# Patient Record
Sex: MALE | Race: WHITE | NOT HISPANIC OR LATINO | Employment: OTHER | URBAN - METROPOLITAN AREA
[De-identification: names, ages, dates, MRNs, and addresses within clinical notes are randomized per-mention and may not be internally consistent; named-entity substitution may affect disease eponyms.]

---

## 2019-09-17 ENCOUNTER — OFFICE VISIT (OUTPATIENT)
Dept: URGENT CARE | Facility: CLINIC | Age: 64
End: 2019-09-17
Payer: MEDICARE

## 2019-09-17 ENCOUNTER — TELEPHONE (OUTPATIENT)
Dept: URGENT CARE | Facility: CLINIC | Age: 64
End: 2019-09-17

## 2019-09-17 ENCOUNTER — HOSPITAL ENCOUNTER (OUTPATIENT)
Dept: RADIOLOGY | Facility: MEDICAL CENTER | Age: 64
End: 2019-09-17
Attending: PHYSICIAN ASSISTANT
Payer: MEDICARE

## 2019-09-17 VITALS
DIASTOLIC BLOOD PRESSURE: 64 MMHG | HEART RATE: 58 BPM | TEMPERATURE: 97.9 F | HEIGHT: 72 IN | BODY MASS INDEX: 22.54 KG/M2 | RESPIRATION RATE: 18 BRPM | WEIGHT: 166.4 LBS | SYSTOLIC BLOOD PRESSURE: 102 MMHG | OXYGEN SATURATION: 96 %

## 2019-09-17 DIAGNOSIS — L72.9 SKIN CYST: ICD-10-CM

## 2019-09-17 PROCEDURE — 76536 US EXAM OF HEAD AND NECK: CPT

## 2019-09-17 PROCEDURE — 99203 OFFICE O/P NEW LOW 30 MIN: CPT | Performed by: PHYSICIAN ASSISTANT

## 2019-09-17 RX ORDER — ATORVASTATIN CALCIUM 40 MG/1
40 TABLET, FILM COATED ORAL NIGHTLY
COMMUNITY

## 2019-09-17 RX ORDER — ESCITALOPRAM OXALATE 20 MG/1
20 TABLET ORAL EVERY EVENING
Status: ON HOLD | COMMUNITY
End: 2023-01-01

## 2019-09-17 RX ORDER — QUETIAPINE FUMARATE 25 MG/1
25 TABLET, FILM COATED ORAL EVERY EVENING
Status: ON HOLD | COMMUNITY
End: 2023-01-01

## 2019-09-17 ASSESSMENT — ENCOUNTER SYMPTOMS
VOMITING: 0
DIZZINESS: 0
HEADACHES: 0
FOCAL WEAKNESS: 0
SENSORY CHANGE: 0
SPEECH CHANGE: 0
FEVER: 0
CHILLS: 0
NAUSEA: 0
SHORTNESS OF BREATH: 0
SEIZURES: 0

## 2019-09-17 NOTE — PROGRESS NOTES
Subjective:     Roby Viramontes II is a 63 y.o. male who presents for Nodule ([place]row[place], soft moves around)       Patient comes clinic noting last over 1 year of skin cyst to top of head.  Patient notes area of soft fluctuant growth to top of head but somewhat mobile to skin of scalp.  Denies progression towards redness.  Denies drainage.  Denies pointing.  Denies fevers chills.  Notes is been present for long-term.  Did show primary care and they are thoughtful of it being a cystic progression.  Patient is seen no drainage from this.  Denies past medical history of similar.  Of note patient did have a brain surgery 2 to 3 years ago.  This is in a different location than his associated incisions.  He denies past medical history of similar.  Is tried no treatments.  History reviewed. No pertinent past medical history.History reviewed. No pertinent surgical history.  Social History     Socioeconomic History   • Marital status:      Spouse name: Not on file   • Number of children: Not on file   • Years of education: Not on file   • Highest education level: Not on file   Occupational History   • Not on file   Social Needs   • Financial resource strain: Not on file   • Food insecurity:     Worry: Not on file     Inability: Not on file   • Transportation needs:     Medical: Not on file     Non-medical: Not on file   Tobacco Use   • Smoking status: Former Smoker     Types: Cigarettes   • Smokeless tobacco: Never Used   Substance and Sexual Activity   • Alcohol use: Not Currently   • Drug use: Yes     Types: Marijuana   • Sexual activity: Not on file   Lifestyle   • Physical activity:     Days per week: Not on file     Minutes per session: Not on file   • Stress: Not on file   Relationships   • Social connections:     Talks on phone: Not on file     Gets together: Not on file     Attends Scientologist service: Not on file     Active member of club or organization: Not on file     Attends meetings of clubs or  organizations: Not on file     Relationship status: Not on file   • Intimate partner violence:     Fear of current or ex partner: Not on file     Emotionally abused: Not on file     Physically abused: Not on file     Forced sexual activity: Not on file   Other Topics Concern   • Not on file   Social History Narrative   • Not on file    History reviewed. No pertinent family history. Review of Systems   Constitutional: Negative for chills and fever.   Respiratory: Negative for shortness of breath.    Gastrointestinal: Negative for nausea and vomiting.   Skin: Negative for itching and rash.        POS for skin mass to top of head   Neurological: Negative for dizziness, sensory change, speech change, focal weakness, seizures ( PMH of seizures, resolved w/ brain surgery 2 yrs ago) and headaches.   No Known Allergies   Objective:   /64 (BP Location: Left arm, Patient Position: Sitting, BP Cuff Size: Adult)   Pulse (!) 58   Temp 36.6 °C (97.9 °F) (Temporal)   Resp 18   Ht 1.829 m (6')   Wt 75.5 kg (166 lb 6.4 oz)   SpO2 96%   BMI 22.57 kg/m²   Physical Exam   Constitutional: He is oriented to person, place, and time. He appears well-developed and well-nourished. No distress.   HENT:   Head: Normocephalic and atraumatic.       Right Ear: External ear normal.   Left Ear: External ear normal.   Nose: Nose normal.   Eyes: Conjunctivae are normal. Right eye exhibits no discharge. Left eye exhibits no discharge. No scleral icterus.   Neck: Neck supple.   Pulmonary/Chest: Effort normal. No respiratory distress.   Musculoskeletal: Normal range of motion.   Neurological: He is alert and oriented to person, place, and time. Coordination normal.   Skin: Skin is warm and dry. He is not diaphoretic. No pallor.   Psychiatric: He has a normal mood and affect.   Nursing note and vitals reviewed.  US soft tissue -   Impression       Heterogeneous hypoechoic solid mass in the left posterior scalp. Findings are nonspecific and  may represent an atypical spindle cell lipoma or sarcoma. Consider MRI with and without contrast or biopsy/resection.            Last Resulted: 09/17/19  1:26 PM              Assessment/Plan:   Assessment    1. Skin cyst  - US-SOFT TISSUES OF HEAD - NECK; Future  - REFERRAL TO DERMATOLOGY  - REFERRAL TO DERMATOLOGY    Other orders  - QUEtiapine (SEROQUEL) 25 MG Tab; Take 25 mg by mouth 2 times a day.  - atorvastatin (LIPITOR) 40 MG Tab; Take 40 mg by mouth every evening.  - escitalopram (LEXAPRO) 20 MG tablet; Take 20 mg by mouth every day.    I am able to contact dermatology and speak to their office.  Patient intends to follow-up with skin cancer [facility] for biopsy and resection and further diagnosis.  We did discuss possibly scheduling an MRI for him today when determined follow-up with dermatology may be more economical.  Patient is encouraged to reach out with challenges scheduling but an emergent referral has been placed today per dermatology's recommendations for rapid scheduling    Return to clinic with lack of resolution or progression of symptoms.      Differential diagnosis, natural history, supportive care, and indications for immediate follow-up discussed.

## 2019-09-23 ENCOUNTER — HOSPITAL ENCOUNTER (OUTPATIENT)
Dept: LAB | Facility: MEDICAL CENTER | Age: 64
End: 2019-09-23
Attending: NURSE PRACTITIONER | Admitting: NURSE PRACTITIONER
Payer: MEDICARE

## 2019-09-23 LAB
ALBUMIN SERPL BCP-MCNC: 4.1 G/DL (ref 3.2–4.9)
ALBUMIN/GLOB SERPL: 1.7 G/DL
ALP SERPL-CCNC: 130 U/L (ref 30–99)
ALT SERPL-CCNC: 11 U/L (ref 2–50)
ANION GAP SERPL CALC-SCNC: 7 MMOL/L (ref 0–11.9)
AST SERPL-CCNC: 13 U/L (ref 12–45)
BILIRUB SERPL-MCNC: 0.6 MG/DL (ref 0.1–1.5)
BUN SERPL-MCNC: 10 MG/DL (ref 8–22)
CALCIUM SERPL-MCNC: 9.2 MG/DL (ref 8.5–10.5)
CHLORIDE SERPL-SCNC: 105 MMOL/L (ref 96–112)
CHOLEST SERPL-MCNC: 160 MG/DL (ref 100–199)
CO2 SERPL-SCNC: 28 MMOL/L (ref 20–33)
CREAT SERPL-MCNC: 1.04 MG/DL (ref 0.5–1.4)
FASTING STATUS PATIENT QL REPORTED: NORMAL
GLOBULIN SER CALC-MCNC: 2.4 G/DL (ref 1.9–3.5)
GLUCOSE SERPL-MCNC: 92 MG/DL (ref 65–99)
HDLC SERPL-MCNC: 40 MG/DL
LDLC SERPL CALC-MCNC: 85 MG/DL
POTASSIUM SERPL-SCNC: 3.6 MMOL/L (ref 3.6–5.5)
PROT SERPL-MCNC: 6.5 G/DL (ref 6–8.2)
SODIUM SERPL-SCNC: 140 MMOL/L (ref 135–145)
TRIGL SERPL-MCNC: 175 MG/DL (ref 0–149)

## 2019-09-23 PROCEDURE — 80061 LIPID PANEL: CPT

## 2019-09-23 PROCEDURE — 80053 COMPREHEN METABOLIC PANEL: CPT

## 2019-09-23 PROCEDURE — 36415 COLL VENOUS BLD VENIPUNCTURE: CPT

## 2019-09-24 ENCOUNTER — HOSPITAL ENCOUNTER (OUTPATIENT)
Facility: MEDICAL CENTER | Age: 64
DRG: 642 | End: 2019-09-25
Attending: EMERGENCY MEDICINE | Admitting: INTERNAL MEDICINE
Payer: MEDICARE

## 2019-09-24 ENCOUNTER — OFFICE VISIT (OUTPATIENT)
Dept: URGENT CARE | Facility: CLINIC | Age: 64
End: 2019-09-24
Payer: MEDICARE

## 2019-09-24 ENCOUNTER — APPOINTMENT (OUTPATIENT)
Dept: RADIOLOGY | Facility: MEDICAL CENTER | Age: 64
DRG: 642 | End: 2019-09-24
Attending: EMERGENCY MEDICINE
Payer: MEDICARE

## 2019-09-24 VITALS
RESPIRATION RATE: 24 BRPM | TEMPERATURE: 96.5 F | SYSTOLIC BLOOD PRESSURE: 136 MMHG | HEIGHT: 72 IN | WEIGHT: 160 LBS | DIASTOLIC BLOOD PRESSURE: 60 MMHG | HEART RATE: 64 BPM | BODY MASS INDEX: 21.67 KG/M2 | OXYGEN SATURATION: 99 %

## 2019-09-24 DIAGNOSIS — R10.9 CONTINUOUS SEVERE ABDOMINAL PAIN: Primary | ICD-10-CM

## 2019-09-24 DIAGNOSIS — R55 NEAR SYNCOPE: ICD-10-CM

## 2019-09-24 DIAGNOSIS — R25.1 SHAKING: ICD-10-CM

## 2019-09-24 DIAGNOSIS — R42 DIZZINESS: ICD-10-CM

## 2019-09-24 DIAGNOSIS — R06.02 SOB (SHORTNESS OF BREATH): ICD-10-CM

## 2019-09-24 PROBLEM — R91.1 INCIDENTAL LUNG NODULE: Status: ACTIVE | Noted: 2019-09-24

## 2019-09-24 PROBLEM — E87.6 HYPOKALEMIA: Status: ACTIVE | Noted: 2019-09-24

## 2019-09-24 PROBLEM — R73.9 HYPERGLYCEMIA: Status: ACTIVE | Noted: 2019-09-24

## 2019-09-24 PROBLEM — K52.9 ACUTE GASTROENTERITIS: Status: ACTIVE | Noted: 2019-09-24

## 2019-09-24 PROBLEM — E83.39 HYPOPHOSPHATEMIA: Status: ACTIVE | Noted: 2019-09-24

## 2019-09-24 LAB
ALBUMIN SERPL BCP-MCNC: 4.1 G/DL (ref 3.2–4.9)
ALBUMIN/GLOB SERPL: 1.8 G/DL
ALP SERPL-CCNC: 129 U/L (ref 30–99)
ALT SERPL-CCNC: 11 U/L (ref 2–50)
ANION GAP SERPL CALC-SCNC: 13 MMOL/L (ref 0–11.9)
APPEARANCE UR: CLEAR
AST SERPL-CCNC: 14 U/L (ref 12–45)
BASOPHILS # BLD AUTO: 0.4 % (ref 0–1.8)
BASOPHILS # BLD: 0.05 K/UL (ref 0–0.12)
BILIRUB SERPL-MCNC: 0.8 MG/DL (ref 0.1–1.5)
BILIRUB UR QL STRIP.AUTO: NEGATIVE
BUN SERPL-MCNC: 11 MG/DL (ref 8–22)
CALCIUM SERPL-MCNC: 9.3 MG/DL (ref 8.5–10.5)
CHLORIDE SERPL-SCNC: 105 MMOL/L (ref 96–112)
CO2 SERPL-SCNC: 19 MMOL/L (ref 20–33)
COLOR UR: YELLOW
CREAT SERPL-MCNC: 1 MG/DL (ref 0.5–1.4)
EKG IMPRESSION: NORMAL
EOSINOPHIL # BLD AUTO: 0 K/UL (ref 0–0.51)
EOSINOPHIL NFR BLD: 0 % (ref 0–6.9)
ERYTHROCYTE [DISTWIDTH] IN BLOOD BY AUTOMATED COUNT: 43.8 FL (ref 35.9–50)
EST. AVERAGE GLUCOSE BLD GHB EST-MCNC: 114 MG/DL
GLOBULIN SER CALC-MCNC: 2.3 G/DL (ref 1.9–3.5)
GLUCOSE SERPL-MCNC: 163 MG/DL (ref 65–99)
GLUCOSE UR STRIP.AUTO-MCNC: NEGATIVE MG/DL
HBA1C MFR BLD: 5.6 % (ref 0–5.6)
HCT VFR BLD AUTO: 45.8 % (ref 42–52)
HGB BLD-MCNC: 16.4 G/DL (ref 14–18)
IMM GRANULOCYTES # BLD AUTO: 0.03 K/UL (ref 0–0.11)
IMM GRANULOCYTES NFR BLD AUTO: 0.3 % (ref 0–0.9)
KETONES UR STRIP.AUTO-MCNC: ABNORMAL MG/DL
LEUKOCYTE ESTERASE UR QL STRIP.AUTO: NEGATIVE
LIPASE SERPL-CCNC: 23 U/L (ref 11–82)
LYMPHOCYTES # BLD AUTO: 2.11 K/UL (ref 1–4.8)
LYMPHOCYTES NFR BLD: 18.1 % (ref 22–41)
MAGNESIUM SERPL-MCNC: 1.8 MG/DL (ref 1.5–2.5)
MCH RBC QN AUTO: 33.3 PG (ref 27–33)
MCHC RBC AUTO-ENTMCNC: 35.8 G/DL (ref 33.7–35.3)
MCV RBC AUTO: 93.1 FL (ref 81.4–97.8)
MICRO URNS: ABNORMAL
MONOCYTES # BLD AUTO: 0.61 K/UL (ref 0–0.85)
MONOCYTES NFR BLD AUTO: 5.2 % (ref 0–13.4)
NEUTROPHILS # BLD AUTO: 8.83 K/UL (ref 1.82–7.42)
NEUTROPHILS NFR BLD: 76 % (ref 44–72)
NITRITE UR QL STRIP.AUTO: NEGATIVE
NRBC # BLD AUTO: 0 K/UL
NRBC BLD-RTO: 0 /100 WBC
PH UR STRIP.AUTO: 5 [PH] (ref 5–8)
PHOSPHATE SERPL-MCNC: <1 MG/DL (ref 2.5–4.5)
PLATELET # BLD AUTO: 181 K/UL (ref 164–446)
PMV BLD AUTO: 9.3 FL (ref 9–12.9)
POTASSIUM SERPL-SCNC: 3.4 MMOL/L (ref 3.6–5.5)
PROT SERPL-MCNC: 6.4 G/DL (ref 6–8.2)
PROT UR QL STRIP: NEGATIVE MG/DL
RBC # BLD AUTO: 4.92 M/UL (ref 4.7–6.1)
RBC UR QL AUTO: NEGATIVE
SODIUM SERPL-SCNC: 137 MMOL/L (ref 135–145)
SP GR UR STRIP.AUTO: <=1.005
TROPONIN T SERPL-MCNC: 10 NG/L (ref 6–19)
UROBILINOGEN UR STRIP.AUTO-MCNC: 0.2 MG/DL
WBC # BLD AUTO: 11.6 K/UL (ref 4.8–10.8)

## 2019-09-24 PROCEDURE — 96366 THER/PROPH/DIAG IV INF ADDON: CPT

## 2019-09-24 PROCEDURE — 84100 ASSAY OF PHOSPHORUS: CPT

## 2019-09-24 PROCEDURE — 99214 OFFICE O/P EST MOD 30 MIN: CPT | Performed by: PHYSICIAN ASSISTANT

## 2019-09-24 PROCEDURE — 99285 EMERGENCY DEPT VISIT HI MDM: CPT

## 2019-09-24 PROCEDURE — 96365 THER/PROPH/DIAG IV INF INIT: CPT

## 2019-09-24 PROCEDURE — 85025 COMPLETE CBC W/AUTO DIFF WBC: CPT

## 2019-09-24 PROCEDURE — 96376 TX/PRO/DX INJ SAME DRUG ADON: CPT

## 2019-09-24 PROCEDURE — 96375 TX/PRO/DX INJ NEW DRUG ADDON: CPT

## 2019-09-24 PROCEDURE — G0378 HOSPITAL OBSERVATION PER HR: HCPCS

## 2019-09-24 PROCEDURE — 84484 ASSAY OF TROPONIN QUANT: CPT

## 2019-09-24 PROCEDURE — 700102 HCHG RX REV CODE 250 W/ 637 OVERRIDE(OP): Performed by: INTERNAL MEDICINE

## 2019-09-24 PROCEDURE — 96361 HYDRATE IV INFUSION ADD-ON: CPT

## 2019-09-24 PROCEDURE — 83036 HEMOGLOBIN GLYCOSYLATED A1C: CPT

## 2019-09-24 PROCEDURE — 700105 HCHG RX REV CODE 258: Performed by: INTERNAL MEDICINE

## 2019-09-24 PROCEDURE — 700101 HCHG RX REV CODE 250: Performed by: INTERNAL MEDICINE

## 2019-09-24 PROCEDURE — 81003 URINALYSIS AUTO W/O SCOPE: CPT

## 2019-09-24 PROCEDURE — 83735 ASSAY OF MAGNESIUM: CPT

## 2019-09-24 PROCEDURE — A9270 NON-COVERED ITEM OR SERVICE: HCPCS | Performed by: INTERNAL MEDICINE

## 2019-09-24 PROCEDURE — 96374 THER/PROPH/DIAG INJ IV PUSH: CPT

## 2019-09-24 PROCEDURE — 700111 HCHG RX REV CODE 636 W/ 250 OVERRIDE (IP): Performed by: INTERNAL MEDICINE

## 2019-09-24 PROCEDURE — 93005 ELECTROCARDIOGRAM TRACING: CPT | Performed by: EMERGENCY MEDICINE

## 2019-09-24 PROCEDURE — 80053 COMPREHEN METABOLIC PANEL: CPT

## 2019-09-24 PROCEDURE — 700111 HCHG RX REV CODE 636 W/ 250 OVERRIDE (IP): Performed by: EMERGENCY MEDICINE

## 2019-09-24 PROCEDURE — 700117 HCHG RX CONTRAST REV CODE 255: Performed by: EMERGENCY MEDICINE

## 2019-09-24 PROCEDURE — 93005 ELECTROCARDIOGRAM TRACING: CPT

## 2019-09-24 PROCEDURE — 99220 PR INITIAL OBSERVATION CARE,LEVL III: CPT | Performed by: INTERNAL MEDICINE

## 2019-09-24 PROCEDURE — 74177 CT ABD & PELVIS W/CONTRAST: CPT

## 2019-09-24 PROCEDURE — 83690 ASSAY OF LIPASE: CPT

## 2019-09-24 PROCEDURE — 700105 HCHG RX REV CODE 258: Performed by: EMERGENCY MEDICINE

## 2019-09-24 RX ORDER — HEPARIN SODIUM 5000 [USP'U]/ML
5000 INJECTION, SOLUTION INTRAVENOUS; SUBCUTANEOUS EVERY 8 HOURS
Status: DISCONTINUED | OUTPATIENT
Start: 2019-09-24 | End: 2019-09-25 | Stop reason: HOSPADM

## 2019-09-24 RX ORDER — ENALAPRILAT 1.25 MG/ML
1.25 INJECTION INTRAVENOUS EVERY 6 HOURS PRN
Status: DISCONTINUED | OUTPATIENT
Start: 2019-09-24 | End: 2019-09-25 | Stop reason: HOSPADM

## 2019-09-24 RX ORDER — OXYCODONE HYDROCHLORIDE 5 MG/1
5 TABLET ORAL
Status: DISCONTINUED | OUTPATIENT
Start: 2019-09-24 | End: 2019-09-25 | Stop reason: HOSPADM

## 2019-09-24 RX ORDER — ONDANSETRON 2 MG/ML
4 INJECTION INTRAMUSCULAR; INTRAVENOUS EVERY 4 HOURS PRN
Status: DISCONTINUED | OUTPATIENT
Start: 2019-09-24 | End: 2019-09-25 | Stop reason: HOSPADM

## 2019-09-24 RX ORDER — LABETALOL HYDROCHLORIDE 5 MG/ML
10 INJECTION, SOLUTION INTRAVENOUS EVERY 4 HOURS PRN
Status: DISCONTINUED | OUTPATIENT
Start: 2019-09-24 | End: 2019-09-25 | Stop reason: HOSPADM

## 2019-09-24 RX ORDER — SODIUM CHLORIDE 9 MG/ML
1000 INJECTION, SOLUTION INTRAVENOUS ONCE
Status: COMPLETED | OUTPATIENT
Start: 2019-09-24 | End: 2019-09-24

## 2019-09-24 RX ORDER — HYDROCODONE BITARTRATE AND ACETAMINOPHEN 5; 325 MG/1; MG/1
1 TABLET ORAL EVERY 4 HOURS PRN
Refills: 0 | COMMUNITY
Start: 2019-09-20 | End: 2019-09-26

## 2019-09-24 RX ORDER — OMEPRAZOLE 20 MG/1
20 CAPSULE, DELAYED RELEASE ORAL DAILY
Status: DISCONTINUED | OUTPATIENT
Start: 2019-09-24 | End: 2019-09-24

## 2019-09-24 RX ORDER — QUETIAPINE FUMARATE 25 MG/1
25 TABLET, FILM COATED ORAL EVERY EVENING
Status: DISCONTINUED | OUTPATIENT
Start: 2019-09-24 | End: 2019-09-25 | Stop reason: HOSPADM

## 2019-09-24 RX ORDER — ACETAMINOPHEN 325 MG/1
650 TABLET ORAL EVERY 6 HOURS PRN
Status: DISCONTINUED | OUTPATIENT
Start: 2019-09-24 | End: 2019-09-25 | Stop reason: HOSPADM

## 2019-09-24 RX ORDER — ESCITALOPRAM OXALATE 10 MG/1
20 TABLET ORAL DAILY
Status: DISCONTINUED | OUTPATIENT
Start: 2019-09-24 | End: 2019-09-25 | Stop reason: HOSPADM

## 2019-09-24 RX ORDER — ONDANSETRON 2 MG/ML
4 INJECTION INTRAMUSCULAR; INTRAVENOUS ONCE
Status: COMPLETED | OUTPATIENT
Start: 2019-09-24 | End: 2019-09-24

## 2019-09-24 RX ORDER — ATORVASTATIN CALCIUM 40 MG/1
40 TABLET, FILM COATED ORAL NIGHTLY
Status: DISCONTINUED | OUTPATIENT
Start: 2019-09-24 | End: 2019-09-25 | Stop reason: HOSPADM

## 2019-09-24 RX ORDER — MORPHINE SULFATE 4 MG/ML
4 INJECTION, SOLUTION INTRAMUSCULAR; INTRAVENOUS
Status: DISCONTINUED | OUTPATIENT
Start: 2019-09-24 | End: 2019-09-25 | Stop reason: HOSPADM

## 2019-09-24 RX ORDER — PROCHLORPERAZINE EDISYLATE 5 MG/ML
10 INJECTION INTRAMUSCULAR; INTRAVENOUS EVERY 6 HOURS PRN
Status: DISCONTINUED | OUTPATIENT
Start: 2019-09-24 | End: 2019-09-25 | Stop reason: HOSPADM

## 2019-09-24 RX ORDER — OXYCODONE HYDROCHLORIDE 10 MG/1
10 TABLET ORAL
Status: DISCONTINUED | OUTPATIENT
Start: 2019-09-24 | End: 2019-09-25 | Stop reason: HOSPADM

## 2019-09-24 RX ORDER — HYDROCODONE BITARTRATE AND ACETAMINOPHEN 5; 325 MG/1; MG/1
1 TABLET ORAL EVERY 4 HOURS PRN
Status: DISCONTINUED | OUTPATIENT
Start: 2019-09-24 | End: 2019-09-24

## 2019-09-24 RX ORDER — ONDANSETRON 4 MG/1
4 TABLET, ORALLY DISINTEGRATING ORAL EVERY 4 HOURS PRN
Status: DISCONTINUED | OUTPATIENT
Start: 2019-09-24 | End: 2019-09-25 | Stop reason: HOSPADM

## 2019-09-24 RX ADMIN — FENTANYL CITRATE 50 MCG: 50 INJECTION, SOLUTION INTRAMUSCULAR; INTRAVENOUS at 11:25

## 2019-09-24 RX ADMIN — POTASSIUM CHLORIDE: 149 INJECTION, SOLUTION, CONCENTRATE INTRAVENOUS at 15:29

## 2019-09-24 RX ADMIN — ONDANSETRON 4 MG: 2 INJECTION INTRAMUSCULAR; INTRAVENOUS at 11:25

## 2019-09-24 RX ADMIN — SODIUM PHOSPHATE, MONOBASIC, MONOHYDRATE AND SODIUM PHOSPHATE, DIBASIC, ANHYDROUS 30 MMOL: 276; 142 INJECTION, SOLUTION INTRAVENOUS at 15:31

## 2019-09-24 RX ADMIN — SODIUM CHLORIDE 1000 ML: 9 INJECTION, SOLUTION INTRAVENOUS at 11:27

## 2019-09-24 RX ADMIN — ATORVASTATIN CALCIUM 40 MG: 40 TABLET, FILM COATED ORAL at 21:02

## 2019-09-24 RX ADMIN — IOHEXOL 100 ML: 350 INJECTION, SOLUTION INTRAVENOUS at 11:50

## 2019-09-24 RX ADMIN — ESCITALOPRAM OXALATE 20 MG: 10 TABLET ORAL at 21:02

## 2019-09-24 RX ADMIN — PROCHLORPERAZINE EDISYLATE 10 MG: 5 INJECTION INTRAMUSCULAR; INTRAVENOUS at 16:35

## 2019-09-24 RX ADMIN — ONDANSETRON 4 MG: 2 INJECTION INTRAMUSCULAR; INTRAVENOUS at 13:23

## 2019-09-24 RX ADMIN — QUETIAPINE FUMARATE 25 MG: 25 TABLET ORAL at 21:02

## 2019-09-24 ASSESSMENT — LIFESTYLE VARIABLES
EVER_SMOKED: YES
HAVE PEOPLE ANNOYED YOU BY CRITICIZING YOUR DRINKING: NO
ALCOHOL_USE: NO
EVER HAD A DRINK FIRST THING IN THE MORNING TO STEADY YOUR NERVES TO GET RID OF A HANGOVER: NO
HAVE YOU EVER FELT YOU SHOULD CUT DOWN ON YOUR DRINKING: NO
AVERAGE NUMBER OF DAYS PER WEEK YOU HAVE A DRINK CONTAINING ALCOHOL: 0
EVER_SMOKED: YES
DO YOU DRINK ALCOHOL: NO
TOTAL SCORE: 0
HOW MANY TIMES IN THE PAST YEAR HAVE YOU HAD 5 OR MORE DRINKS IN A DAY: 0
ON A TYPICAL DAY WHEN YOU DRINK ALCOHOL HOW MANY DRINKS DO YOU HAVE: 0
EVER FELT BAD OR GUILTY ABOUT YOUR DRINKING: NO
TOTAL SCORE: 0
TOTAL SCORE: 0
CONSUMPTION TOTAL: NEGATIVE

## 2019-09-24 ASSESSMENT — PATIENT HEALTH QUESTIONNAIRE - PHQ9
SUM OF ALL RESPONSES TO PHQ9 QUESTIONS 1 AND 2: 0
2. FEELING DOWN, DEPRESSED, IRRITABLE, OR HOPELESS: NOT AT ALL
1. LITTLE INTEREST OR PLEASURE IN DOING THINGS: NOT AT ALL

## 2019-09-24 ASSESSMENT — COPD QUESTIONNAIRES
HAVE YOU SMOKED AT LEAST 100 CIGARETTES IN YOUR ENTIRE LIFE: YES
COPD SCREENING SCORE: 6
DURING THE PAST 4 WEEKS HOW MUCH DID YOU FEEL SHORT OF BREATH: NONE/LITTLE OF THE TIME
DO YOU EVER COUGH UP ANY MUCUS OR PHLEGM?: YES, EVERY DAY

## 2019-09-24 NOTE — ASSESSMENT & PLAN NOTE
-I will replace his low potassium with IV KCl with his IV fluids, with LR +20 mg of KCl at 125 cc/h magnesium levels normal.  BMP in the morning.

## 2019-09-24 NOTE — ASSESSMENT & PLAN NOTE
-Likely stress related.  No history of diabetes.  Check hemoglobin A1c.  Monitor.  Hold off on insulin coverage for now.

## 2019-09-24 NOTE — ED NOTES
Verbalizes understanding of POC. Medicated per MAR. Blood in lab. Aware of need for urine sample and urinal at bedside. Call light within reach.

## 2019-09-24 NOTE — PROGRESS NOTES
Report received, pt care assumed. Pt arrive to floor via wheelchair. Pt ambulate to bed with steady gait. Cardiac monitor on. MD Mae notified of pt arrival to floor. Will continue to monitor.

## 2019-09-24 NOTE — ED PROVIDER NOTES
"ED Provider Note    Scribed for Ye Foley D.O. by Elsi Tejada. 9/24/2019  11:13 AM    Primary care provider: Pcp Pt States None  Means of arrival: EMS  History obtained from: Patient  History limited by: None    CHIEF COMPLAINT  Chief Complaint   Patient presents with   • Nausea     and diarrhea beginning this am   • Abdominal Pain   • Dizziness     HPI  Roby Viramontes II is a 63 y.o. male who presents to the Emergency Department via EMS for middle abdominal pain that began this morning. The patient was defecating when the pain began. He denies there being any blood in his stool. He has associated lightheadedness that began at the same time. He also has nausea and vomiting that began when he woke up. His vomiting is described as \"dry heaves.\" The patient denies fever. He had a growth on the crown of his head removed last week and his wife would also like it checked. He denies any history of aneurysm. The patient denies smoking or drinking alcohol.    REVIEW OF SYSTEMS  Pertinent positives include middle abdominal pain, lightheadedness, nausea, and vomiting. Pertinent negatives include no fever or blood in stool.  All other systems reviewed and negative.    PAST MEDICAL HISTORY  History reviewed. No pertinent past medical history.    SURGICAL HISTORY  History reviewed. No pertinent surgical history.     SOCIAL HISTORY  Social History     Tobacco Use   • Smoking status: Former Smoker     Types: Cigarettes   • Smokeless tobacco: Never Used   Substance Use Topics   • Alcohol use: Not Currently   • Drug use: Yes     Types: Marijuana, Inhaled     Comment: marijuana      Social History     Substance and Sexual Activity   Drug Use Yes   • Types: Marijuana, Inhaled    Comment: marijuana     FAMILY HISTORY  History reviewed. No pertinent family history.    CURRENT MEDICATIONS  Home Medications     Reviewed by Ewa Flaherty R.N. (Registered Nurse) on 09/24/19 at 1509  Med List Status: Complete "   Medication Last Dose Status   atorvastatin (LIPITOR) 40 MG Tab 9/23/2019 Active   escitalopram (LEXAPRO) 20 MG tablet 9/23/2019 Active   HYDROcodone-acetaminophen (NORCO) 5-325 MG Tab per tablet NEW RX Active   QUEtiapine (SEROQUEL) 25 MG Tab 9/23/2019 Active              ALLERGIES  No Known Allergies    PHYSICAL EXAM  VITAL SIGNS: /79   Pulse (!) 55   Temp 36.2 °C (97.2 °F) (Temporal)   Resp 20   Ht 1.829 m (6')   Wt 72.6 kg (160 lb)   SpO2 100%   BMI 21.70 kg/m²     Nursing notes and vitals reviewed.  Constitutional: Well developed, Well nourished, Moderate distress, Non-toxic appearance.   HENT: Dry mucous membranes, 5 cm incision with staples and sutures and no surrounding erythema and edema.  Eyes: PERRLA, EOMI, Conjunctiva normal, No discharge.   Cardiovascular: Normal heart rate, Normal rhythm, No murmurs, No rubs, No gallops.   Thorax & Lungs: No respiratory distress, No rales, No rhonchi, No wheezing, No chest tenderness.   Abdomen: Bowel sounds normal, Soft, Diffuse tenderness throughout, No guarding, No rebound, No masses, No pulsatile masses.   Skin: Warm, Dry, No erythema, No rash.   Musculoskeletal: Intact distal pulses, No edema, No cyanosis, No clubbing. Good range of motion in all major joints. No tenderness to palpation or major deformities noted, no CVA tenderness, no midline back tenderness.   Neurologic: Alert & oriented x 3, Normal motor function, Normal sensory function, Tremors, No focal deficits noted.  Psychiatric: Anxious affect    DIAGNOSTIC STUDIES/PROCEDURES    LABS  Results for orders placed or performed during the hospital encounter of 09/24/19   CBC WITH DIFFERENTIAL   Result Value Ref Range    WBC 11.6 (H) 4.8 - 10.8 K/uL    RBC 4.92 4.70 - 6.10 M/uL    Hemoglobin 16.4 14.0 - 18.0 g/dL    Hematocrit 45.8 42.0 - 52.0 %    MCV 93.1 81.4 - 97.8 fL    MCH 33.3 (H) 27.0 - 33.0 pg    MCHC 35.8 (H) 33.7 - 35.3 g/dL    RDW 43.8 35.9 - 50.0 fL    Platelet Count 181 164 - 446  K/uL    MPV 9.3 9.0 - 12.9 fL    Neutrophils-Polys 76.00 (H) 44.00 - 72.00 %    Lymphocytes 18.10 (L) 22.00 - 41.00 %    Monocytes 5.20 0.00 - 13.40 %    Eosinophils 0.00 0.00 - 6.90 %    Basophils 0.40 0.00 - 1.80 %    Immature Granulocytes 0.30 0.00 - 0.90 %    Nucleated RBC 0.00 /100 WBC    Neutrophils (Absolute) 8.83 (H) 1.82 - 7.42 K/uL    Lymphs (Absolute) 2.11 1.00 - 4.80 K/uL    Monos (Absolute) 0.61 0.00 - 0.85 K/uL    Eos (Absolute) 0.00 0.00 - 0.51 K/uL    Baso (Absolute) 0.05 0.00 - 0.12 K/uL    Immature Granulocytes (abs) 0.03 0.00 - 0.11 K/uL    NRBC (Absolute) 0.00 K/uL   COMP METABOLIC PANEL   Result Value Ref Range    Sodium 137 135 - 145 mmol/L    Potassium 3.4 (L) 3.6 - 5.5 mmol/L    Chloride 105 96 - 112 mmol/L    Co2 19 (L) 20 - 33 mmol/L    Anion Gap 13.0 (H) 0.0 - 11.9    Glucose 163 (H) 65 - 99 mg/dL    Bun 11 8 - 22 mg/dL    Creatinine 1.00 0.50 - 1.40 mg/dL    Calcium 9.3 8.5 - 10.5 mg/dL    AST(SGOT) 14 12 - 45 U/L    ALT(SGPT) 11 2 - 50 U/L    Alkaline Phosphatase 129 (H) 30 - 99 U/L    Total Bilirubin 0.8 0.1 - 1.5 mg/dL    Albumin 4.1 3.2 - 4.9 g/dL    Total Protein 6.4 6.0 - 8.2 g/dL    Globulin 2.3 1.9 - 3.5 g/dL    A-G Ratio 1.8 g/dL   LIPASE   Result Value Ref Range    Lipase 23 11 - 82 U/L   URINALYSIS CULTURE, IF INDICATED   Result Value Ref Range    Color Yellow     Character Clear     Specific Gravity <=1.005 <1.035    Ph 5.0 5.0 - 8.0    Glucose Negative Negative mg/dL    Ketones Trace (A) Negative mg/dL    Protein Negative Negative mg/dL    Bilirubin Negative Negative    Urobilinogen, Urine 0.2 Negative    Nitrite Negative Negative    Leukocyte Esterase Negative Negative    Occult Blood Negative Negative    Micro Urine Req see below    TROPONIN   Result Value Ref Range    Troponin T 10 6 - 19 ng/L   MAGNESIUM   Result Value Ref Range    Magnesium 1.8 1.5 - 2.5 mg/dL   PHOSPHORUS   Result Value Ref Range    Phosphorus <1.0 (LL) 2.5 - 4.5 mg/dL   ESTIMATED GFR   Result Value Ref  Range    GFR If African American >60 >60 mL/min/1.73 m 2    GFR If Non African American >60 >60 mL/min/1.73 m 2   HEMOGLOBIN A1C   Result Value Ref Range    Glycohemoglobin 5.6 0.0 - 5.6 %    Est Avg Glucose 114 mg/dL   EKG   Result Value Ref Range    Report       [facility] Emergency Dept.    Test Date:  2019  Pt Name:    BRISEYDA CHAUHAN            Department: ER  MRN:        [id number]                      Room:       T201  Gender:     Male                         Technician: [id number]  :        1955                   Requested By:ER TRIAGE PROTOCOL  Order #:    [id number]                    Reading MD: HELADIO WICK DO    Measurements  Intervals                                Axis  Rate:       53                           P:          78  UT:         176                          QRS:        75  QRSD:       106                          T:          13  QT:         516  QTc:        485    Interpretive Statements  SINUS BRADYCARDIA  LEFT ATRIAL ABNORMALITY  BORDERLINE INTRAVENTRICULAR CONDUCTION DELAY  NONSPECIFIC T ABNORMALITIES, LATERAL LEADS  BORDERLINE PROLONGED QT INTERVAL  No previous ECG available for comparison    Electronically Signed On 2019 17:35:17 PDT by HELADIO WICK DO       All labs reviewed by me.    RADIOLOGY  CT-ABDOMEN-PELVIS WITH   Final Result      1.  No evidence of bowel obstruction or focal inflammatory change.      2.  Fatty liver. There is a tiny subcentimeter low-density focus within the left lobe of the liver likely representing a cyst or hemangioma.      3.  Right renal cysts.      4.  4 mm indeterminate pulmonary nodule within the left lung base.      Low Risk: No routine follow-up      High Risk: Optional CT at 12 months      Comments: Nodules less than 6 mm do not require routine follow-up, but certain patients at high risk with suspicious nodule morphology, upper lobe location, or both may warrant 12-month follow-up.      Low Risk -  Minimal or absent history of smoking and of other known risk factors.      High Risk - History of smoking or of other known risk factors.      Note: These recommendations do not apply to lung cancer screening, patients with immunosuppression, or patients with known primary cancer.      Fleischner Society 2017 Guidelines for Management of Incidentally Detected Pulmonary Nodules in Adults        The radiologist's interpretation of all radiological studies have been reviewed by me.    COURSE & MEDICAL DECISION MAKING  Pertinent Labs & Imaging studies reviewed. (See chart for details)    11:13 AM - Patient seen and examined at bedside. Patient will be treated with Sublimaze 50 mcg, Zofran 4 mg, and NS infusion 1000 mL. Ordered CT-Abdomen Pelvis, Phosphorus, Troponin, Magnesium, CBC with Differential, CMP, Lipase, UA, Culture if Indicated, Estimated GFR, and EKG to evaluate his symptoms.     12:35 PM - I reviewed patient's lab and radiology results.    12:39 PM - I reevaluated the patient at bedside and informed him of the findings. I told him we would give him water to see if he could tolerate it. I told him of the plan for admission. He understands and agrees.    12:41 PM I discussed the patient's case and the above findings with Dr. Ramirez (Hospitalist) who agrees to evaluate the patient.    HYDRATION: Based on the patient's presentation of Acute Diarrhea, Acute Vomiting and Other Dry Mucous Membranes the patient was given IV fluids. IV Hydration was used because oral hydration was not adequate alone. Upon recheck following hydration, the patient was improved, moist mucous membranes, cap refill less than 2 seconds.    This is a charming 63 y.o. male that presents with nausea, vomiting and diarrhea.  The patient initially significant dizziness.  Here in the emergency department, CT scan was completed secondary to severe abdominal pain was negative for acute appendicitis, diverticulitis, small bowel obstruction, colonic  mass, aortic abnormality.  He did have findings of a significant low phosphorus less than 1 I do believe is probably causing his dizziness and lightheadedness.  I am sure the etiology of this patient's condition.  Patient will be admitted for IV fluid administration and he did respond appropriate fluid administration.  DISPOSITION:  Patient will be admitted to Dr. Ramirez, Hospitalist, in guarded condition.     FINAL IMPRESSION  Nausea vomiting  Diarrhea  Abdominal pain  Hypophosphatemia     I, Elsi Tejada (Matthew), am scribing for, and in the presence of, Ye Foley D.O    Electronically signed by: Elsi Tejada (Scribe), 9/24/2019    IYe D.O. personally performed the services described in this documentation, as scribed by Elsi Tejada in my presence, and it is both accurate and complete. C    The note accurately reflects work and decisions made by me.  Ye Foley  9/24/2019  5:47 PM

## 2019-09-24 NOTE — ED NOTES
Pt updated on poc and admit plans. PIV remains CDI and patent.   Room checked and all pt belongings transported with pt. Pt to T201 with CDU RN now via w/c.

## 2019-09-24 NOTE — DISCHARGE PLANNING
Care Transition Team Assessment    Spoke with patient at bedside. Anticipate no needs at present time. Uses [facility] pharmacy but can not recall which one @ present time. Spouse will be ride @ D/C.    Information Source  Orientation : Oriented x 4  Information Given By: Patient    Readmission Evaluation  Is this a readmission?: No    Interdisciplinary Discharge Planning  Does Admitting Nurse Feel This Could be a Complex Discharge?: No  Primary Care Physician: None  Lives with - Patient's Self Care Capacity: Spouse  Support Systems: Spouse / Significant Other  Housing / Facility: [address]  Do You Take your Prescribed Medications Regularly: Yes  Able to Return to Previous ADL's: Yes  Mobility Issues: No  Prior Services: Home-Independent  Patient Expects to be Discharged to:: Home  Assistance Needed: No  Durable Medical Equipment: Not Applicable    Discharge Preparedness  What are your discharge supports?: Spouse  Prior Functional Level: Ambulatory    Functional Assesment  Prior Functional Level: Ambulatory    Finances  Prescription Coverage: Yes    Anticipated Discharge Information  Anticipated discharge disposition: Home  Discharge Address: [address] [place] [address]  Discharge Contact Phone Number: [phone]

## 2019-09-24 NOTE — ED NOTES
Med Rec complete per Pt at bedside  Allergies Reviewed  Ok per Pt to discuss medications with visitor/s present    Pt states that he has been taking an unknown ABX.  Called pt's pharmacy, [facility] reports no recent ABX filled, Pt states ABX were filled at [facility].

## 2019-09-24 NOTE — ASSESSMENT & PLAN NOTE
-I will replace his low phosphate with 30 mmol of IV sodium phosphate.  Repeat levels in the morning.

## 2019-09-24 NOTE — PATIENT INSTRUCTIONS
Abdominal Pain, Adult  Many things can cause belly (abdominal) pain. Most times, belly pain is not dangerous. Many cases of belly pain can be watched and treated at home. Sometimes belly pain is serious, though. Your doctor will try to find the cause of your belly pain.  Follow these instructions at home:  · Take over-the-counter and prescription medicines only as told by your doctor. Do not take medicines that help you poop (laxatives) unless told to by your doctor.  · Drink enough fluid to keep your pee (urine) clear or pale yellow.  · Watch your belly pain for any changes.  · Keep all follow-up visits as told by your doctor. This is important.  Contact a doctor if:  · Your belly pain changes or gets worse.  · You are not hungry, or you lose weight without trying.  · You are having trouble pooping (constipated) or have watery poop (diarrhea) for more than 2-3 days.  · You have pain when you pee or poop.  · Your belly pain wakes you up at night.  · Your pain gets worse with meals, after eating, or with certain foods.  · You are throwing up and cannot keep anything down.  · You have a fever.  Get help right away if:  · Your pain does not go away as soon as your doctor says it should.  · You cannot stop throwing up.  · Your pain is only in areas of your belly, such as the right side or the left lower part of the belly.  · You have bloody or black poop, or poop that looks like tar.  · You have very bad pain, cramping, or bloating in your belly.  · You have signs of not having enough fluid or water in your body (dehydration), such as:  ¨ Dark pee, very little pee, or no pee.  ¨ Cracked lips.  ¨ Dry mouth.  ¨ Sunken eyes.  ¨ Sleepiness.  ¨ Weakness.  This information is not intended to replace advice given to you by your health care provider. Make sure you discuss any questions you have with your health care provider.  Document Released: 06/05/2009 Document Revised: 07/07/2017 Document Reviewed: 05/31/2017  [employer]  Interactive Patient Education © 2017 [employer]

## 2019-09-24 NOTE — ED NOTES
Patient ambulatory to restroom with steady gait. ERP at bedside to discuss labs. Given water per ERPs orders.

## 2019-09-24 NOTE — H&P
Hospital Medicine History & Physical Note    Date of Service  9/24/2019    Primary Care Physician  Pcp Pt States None    Consultants  None    Code Status  Full    Chief Complaint  Nausea, vomiting, abdominal pain, diarrhea    History of Presenting Illness  63 y.o. male without much past medical history, who presented 9/24/2019 with acute onset nausea, vomiting, abdominal pain, and diarrhea.     He states he was doing well until this morning, after waking up, he went to the bathroom, and had 3 episodes of nonbloody, watery diarrhea.  He then started to feel nauseated, with episodes of vomiting and dry heaving, followed by chills without any fevers.  He also started to have abdominal pain which was diffuse, described as cramping, and constant, rated 10 out of 10 in severity.  He also felt short of breath, without any chest pain.  He denied any other symptoms such as cough, or dysuria. He then decided to go to the ED today.    Notably, he denies any sick contacts, or recent travel.  He has not dined out, and has not had any recreational activities recently.    ED course:  The patient was initially evaluated.  His vital signs are stable.  Initial blood work-up showed WBC of 11,600 with left shift but no bandemia, with BMP remarkable for potassium of 3.4, and phosphate of less than 1.0.  Magnesium is normal.  Blood glucose was 163.  Liver function tests were normal.  Troponin was negative.  CT of the abdomen and pelvis showed no focal inflammatory changes, and no evidence of SBO, with note of fatty liver, right renal cyst, and 4 mm indeterminate pulmonary nodule in the left lung base.  Patient was given normal saline and Zofran, and was subsequently admitted to hospitalist service.    Review of Systems  ROS    Past Medical History  Reviewed.  He has no pertinent past medical history.     Surgical History  Reviewed and not pertinent.      Family History  Reviewed and not pertinent.       Social History   reports that he  has quit smoking. His smoking use included cigarettes. He has never used smokeless tobacco. He reports that he drank alcohol. He reports that he has current or past drug history. Drugs: Marijuana and Inhaled.    Allergies  No Known Allergies    Medications  Prior to Admission Medications   Prescriptions Last Dose Informant Patient Reported? Taking?   HYDROcodone-acetaminophen (NORCO) 5-325 MG Tab per tablet NEW RX at NEW RX Patient Yes No   Sig: Take 1 Tab by mouth every four hours as needed. Indications: Pain   QUEtiapine (SEROQUEL) 25 MG Tab 9/23/2019 at PM Patient Yes No   Sig: Take 25 mg by mouth every evening.   atorvastatin (LIPITOR) 40 MG Tab 9/23/2019 at PM Patient Yes No   Sig: Take 40 mg by mouth every evening.   escitalopram (LEXAPRO) 20 MG tablet 9/23/2019 at PM Patient Yes No   Sig: Take 20 mg by mouth every day.      Facility-Administered Medications: None       Physical Exam  Temp:  [36.2 °C (97.2 °F)] 36.2 °C (97.2 °F)  Pulse:  [45-55] 53  Resp:  [16-20] 16  BP: (147-170)/(69-79) 170/78  SpO2:  [100 %] 100 %    Physical Exam   Constitutional: He is oriented to person, place, and time. He appears well-developed and well-nourished. No distress.   HENT:   Head: Normocephalic and atraumatic.   Mouth/Throat: No oropharyngeal exudate.   Dry lips and oral mucosa   Eyes: Pupils are equal, round, and reactive to light. EOM are normal. Right eye exhibits no discharge. Left eye exhibits no discharge. No scleral icterus.   Neck: Normal range of motion. Neck supple.   Cardiovascular: Normal rate and regular rhythm. Exam reveals no gallop and no friction rub.   No murmur heard.  Pulmonary/Chest: Effort normal and breath sounds normal. He has no wheezes. He has no rales. He exhibits no tenderness.   Abdominal: Soft. Bowel sounds are normal. There is no tenderness. There is no rebound and no guarding.   Musculoskeletal: Normal range of motion. He exhibits no edema or tenderness.   Lymphadenopathy:     He has no  cervical adenopathy.   Neurological: He is alert and oriented to person, place, and time. No cranial nerve deficit.   Skin: Skin is warm and dry. No rash noted. He is not diaphoretic. No erythema.   Psychiatric: He has a normal mood and affect. His behavior is normal. Thought content normal.   Vitals reviewed.      Laboratory:  Recent Labs     09/24/19  1107   WBC 11.6*   RBC 4.92   HEMOGLOBIN 16.4   HEMATOCRIT 45.8   MCV 93.1   MCH 33.3*   MCHC 35.8*   RDW 43.8   PLATELETCT 181   MPV 9.3     Recent Labs     09/23/19  0900 09/24/19  1107   SODIUM 140 137   POTASSIUM 3.6 3.4*   CHLORIDE 105 105   CO2 28 19*   GLUCOSE 92 163*   BUN 10 11   CREATININE 1.04 1.00   CALCIUM 9.2 9.3     Recent Labs     09/23/19  0900 09/24/19  1107   ALTSGPT 11 11   ASTSGOT 13 14   ALKPHOSPHAT 130* 129*   TBILIRUBIN 0.6 0.8   LIPASE  --  23   GLUCOSE 92 163*         No results for input(s): NTPROBNP in the last 72 hours.  Recent Labs     09/23/19  0900   TRIGLYCERIDE 175*   HDL 40   LDL 85     Recent Labs     09/24/19  1107   TROPONINT 10       Urinalysis:    Recent Labs     09/24/19  1232   SPECGRAVITY <=1.005   GLUCOSEUR Negative   KETONES Trace*   NITRITE Negative   LEUKESTERAS Negative        Imaging:  CT-ABDOMEN-PELVIS WITH   Final Result      1.  No evidence of bowel obstruction or focal inflammatory change.      2.  Fatty liver. There is a tiny subcentimeter low-density focus within the left lobe of the liver likely representing a cyst or hemangioma.      3.  Right renal cysts.      4.  4 mm indeterminate pulmonary nodule within the left lung base.      Low Risk: No routine follow-up      High Risk: Optional CT at 12 months      Comments: Nodules less than 6 mm do not require routine follow-up, but certain patients at high risk with suspicious nodule morphology, upper lobe location, or both may warrant 12-month follow-up.      Low Risk - Minimal or absent history of smoking and of other known risk factors.      High Risk - History  of smoking or of other known risk factors.      Note: These recommendations do not apply to lung cancer screening, patients with immunosuppression, or patients with known primary cancer.      Fleischner Society 2017 Guidelines for Management of Incidentally Detected Pulmonary Nodules in Adults            Assessment/Plan:  I anticipate this patient is appropriate for observation status at this time.    Acute gastroenteritis- (present on admission)  Assessment & Plan  -Likely viral.  Nothing acute on CT.  -I will start him on supportive care with IV fluids, along with pain medication with IV morphine/oxycodone, and antiemetics.   -I will replace his electrolyte deficiencies.   -I will send for stool studies including ova and parasites, Giardia and Cryptosporidium, lactoferrin, stool WBC, and so cultures.  -Trend WBC count.  Watch for fevers.  -Clear liquid diet for now, advance as symptoms improve and tolerates oral intake.    Incidental lung nodule- (present on admission)  Assessment & Plan  -Needs continued outpatient surveillance, with serial CTs per Fleischner Society recommendations.    Hyperglycemia- (present on admission)  Assessment & Plan  -Likely stress related.  No history of diabetes.  Check hemoglobin A1c.  Monitor.  Hold off on insulin coverage for now.    Hypokalemia- (present on admission)  Assessment & Plan  -I will replace his low potassium with IV KCl with his IV fluids, with LR +20 mg of KCl at 125 cc/h magnesium levels normal.  BMP in the morning.    Hypophosphatemia- (present on admission)  Assessment & Plan  -I will replace his low phosphate with 30 mmol of IV sodium phosphate.  Repeat levels in the morning.      VTE prophylaxis: heparin SQ

## 2019-09-24 NOTE — ASSESSMENT & PLAN NOTE
-Likely viral.  Nothing acute on CT.  -I will start him on supportive care with IV fluids, along with pain medication with IV morphine/oxycodone, and antiemetics.   -I will replace his electrolyte deficiencies.   -I will send for stool studies including ova and parasites, Giardia and Cryptosporidium, lactoferrin, stool WBC, and so cultures.  -Trend WBC count.  Watch for fevers.  -Clear liquid diet for now, advance as symptoms improve and tolerates oral intake.

## 2019-09-24 NOTE — ED TRIAGE NOTES
Chief Complaint   Patient presents with   • Nausea     and diarrhea beginning this am   • Abdominal Pain   • Dizziness     BIB [facility] for above. Explained triage process, to waiting room. Asked to inform RN if questions or concerns arise.

## 2019-09-24 NOTE — CARE PLAN
Pt instructed on use of call light and asked to call when needing to get up; pt agrees. Call light and personal belongings within reach of pt. Pt denies any additional needs at this time. Will continue to monitor.

## 2019-09-24 NOTE — ED NOTES
Lab called with critical result of Phosphorus at <1. Critical lab result read back.    notified of critical lab result at 1235.  Critical lab result read back by .

## 2019-09-24 NOTE — PROGRESS NOTES
"Subjective:      Pt is a 63 y.o. male who presents with GI Problem (xtoday, stomach pain, dizziness, nausea, diarrhea, vomiting, shaking inside, spot removed from head)            HPI  This is a new problem. Pt notes recently had skin lesion removed from scalp a few days ago and now states he awoke this morning with intense generalized stomach pain, SOB, dizziness, nausea, diarrhea, vomiting, shaking and near syncope. Pt has not taken any Rx medications for this condition. Pt states the pain is a 10/10, aching in nature and worse \"right now\". Pt denies CP, paresthesias, change in vision, hives, or other joint pain. The pt's medication list, problem list, and allergies have been evaluated and reviewed during today's visit.    PMH:  Negative per pt.      PSH:  Negative per pt.      Fam Hx:  the patient's family history is not pertinent to their current complaint      Soc HX:  Social History     Socioeconomic History   • Marital status:      Spouse name: Not on file   • Number of children: Not on file   • Years of education: Not on file   • Highest education level: Not on file   Occupational History   • Not on file   Social Needs   • Financial resource strain: Not on file   • Food insecurity:     Worry: Not on file     Inability: Not on file   • Transportation needs:     Medical: Not on file     Non-medical: Not on file   Tobacco Use   • Smoking status: Former Smoker     Types: Cigarettes   • Smokeless tobacco: Never Used   Substance and Sexual Activity   • Alcohol use: Not Currently   • Drug use: Yes     Types: Marijuana   • Sexual activity: Not on file   Lifestyle   • Physical activity:     Days per week: Not on file     Minutes per session: Not on file   • Stress: Not on file   Relationships   • Social connections:     Talks on phone: Not on file     Gets together: Not on file     Attends Jew service: Not on file     Active member of club or organization: Not on file     Attends meetings of clubs or " organizations: Not on file     Relationship status: Not on file   • Intimate partner violence:     Fear of current or ex partner: Not on file     Emotionally abused: Not on file     Physically abused: Not on file     Forced sexual activity: Not on file   Other Topics Concern   • Not on file   Social History Narrative   • Not on file         Medications:    Current Outpatient Medications:   •  QUEtiapine (SEROQUEL) 25 MG Tab, Take 25 mg by mouth 2 times a day., Disp: , Rfl:   •  atorvastatin (LIPITOR) 40 MG Tab, Take 40 mg by mouth every evening., Disp: , Rfl:   •  escitalopram (LEXAPRO) 20 MG tablet, Take 20 mg by mouth every day., Disp: , Rfl:   •  HYDROcodone-acetaminophen (NORCO) 5-325 MG Tab per tablet, TK 1 T PO  Q 4-6 H FOR PAIN, Disp: , Rfl: 0      Allergies:  Patient has no known allergies.    ROS   Wife provides answers as pt with head in arms breathing rapidly and not answering questions  Constitutional: Negative for fever, chills and POS malaise/fatigue and shaking.   HENT: Negative for congestion and sore throat.    Eyes: Negative for blurred vision, double vision and photophobia.   Respiratory: Negative for cough and shortness of breath.  Cardiovascular: Negative for chest pain and palpitations.   Gastrointestinal: POS nausea, vomiting, abdominal pain, diarrhea  Genitourinary: Negative for dysuria and flank pain.   Musculoskeletal: Negative for joint pain and myalgias.   Skin: Negative for itching and rash.   Neurological: POS for dizziness and near syncope  Endo/Heme/Allergies: Does not bruise/bleed easily.   Psychiatric/Behavioral: Negative for depression. The patient is not nervous/anxious.           Objective:     /60 (BP Location: Left arm, Patient Position: Sitting, BP Cuff Size: Adult)   Pulse 64   Temp 35.8 °C (96.5 °F) (Temporal)   Resp (!) 24   Ht 1.829 m (6')   Wt 72.6 kg (160 lb)   SpO2 99%   BMI 21.70 kg/m²      Physical Exam   Abdominal: Soft. Normal appearance and bowel  sounds are normal. He exhibits no shifting dullness, no distension, no pulsatile liver, no fluid wave, no abdominal bruit, no ascites, no pulsatile midline mass and no mass. There is no hepatosplenomegaly. There is generalized tenderness. There is guarding and tenderness at McBurney's point. There is no rigidity, no rebound, no CVA tenderness and negative Delgado's sign. No hernia.         Constitutional: PT is oriented to person, place, and time. PT appears well-developed and well-nourished. Moderate distress with shaking and not answering questions appropriately.   HENT:   Head: Normocephalic and atraumatic.   Mouth/Throat: Oropharynx is clear and moist. No oropharyngeal exudate.   Eyes: Conjunctivae normal and EOM are normal. Pupils are equal, round, and reactive to light.   Neck: Normal range of motion. Neck supple. No thyromegaly present.   Cardiovascular: Normal rate, regular rhythm, normal heart sounds and intact distal pulses.  Exam reveals no gallop and no friction rub.    No murmur heard.  Pulmonary/Chest: Tachypnea. PT has no wheezes. PT has no rales. Pt exhibits no tenderness.   Musculoskeletal: Normal range of motion. PT exhibits no edema and no tenderness.   Neurological: PT is alert and oriented to person, place, and time. PT has normal reflexes. No cranial nerve deficit.   Skin: Skin is warm and dry. No rash noted. PT is not diaphoretic. No erythema.       Psychiatric: PT is not answering questions in timely or appropriate manner which he feel is due to SOB and abd pain            Assessment/Plan:     1. Continuous severe abdominal pain      2. Dizziness      3. SOB (shortness of breath)      4. Shaking      5. Near syncope    Concern for need for STAT  CT abd/pelvis and lab work at higher level of triage than I can provide at this   TO [facility] NOW for further evaluation. Spoke with [facility] charge on the phone and he graciously accepted transfer of care for further imaging and evaluation of the  pt. Reiterated to patient that although a provider to provider transfer was made this will not necessarily expedite the ER process.  [facility] to ER now  Rest, fluids encouraged.  AVS with medical info given.  Pt was in full understanding and agreement with the plan.  Differential diagnosis, natural history, supportive care, and indications for immediate follow-up discussed. All questions answered. Patient agrees with the plan of care.  Follow-up as needed if symptoms worsen or fail to improve.

## 2019-09-25 ENCOUNTER — PATIENT OUTREACH (OUTPATIENT)
Dept: HEALTH INFORMATION MANAGEMENT | Facility: OTHER | Age: 64
End: 2019-09-25

## 2019-09-25 VITALS
SYSTOLIC BLOOD PRESSURE: 137 MMHG | HEIGHT: 72 IN | DIASTOLIC BLOOD PRESSURE: 66 MMHG | OXYGEN SATURATION: 93 % | BODY MASS INDEX: 22.57 KG/M2 | WEIGHT: 166.67 LBS | RESPIRATION RATE: 18 BRPM | HEART RATE: 63 BPM | TEMPERATURE: 98 F

## 2019-09-25 PROBLEM — E83.39 HYPOPHOSPHATEMIA: Status: RESOLVED | Noted: 2019-09-24 | Resolved: 2019-09-25

## 2019-09-25 PROBLEM — K52.9 ACUTE GASTROENTERITIS: Status: RESOLVED | Noted: 2019-09-24 | Resolved: 2019-09-25

## 2019-09-25 PROBLEM — E87.6 HYPOKALEMIA: Status: RESOLVED | Noted: 2019-09-24 | Resolved: 2019-09-25

## 2019-09-25 PROBLEM — R73.9 HYPERGLYCEMIA: Status: RESOLVED | Noted: 2019-09-24 | Resolved: 2019-09-25

## 2019-09-25 LAB
ANION GAP SERPL CALC-SCNC: 8 MMOL/L (ref 0–11.9)
BASOPHILS # BLD AUTO: 0.2 % (ref 0–1.8)
BASOPHILS # BLD: 0.02 K/UL (ref 0–0.12)
BUN SERPL-MCNC: 7 MG/DL (ref 8–22)
CALCIUM SERPL-MCNC: 8.3 MG/DL (ref 8.5–10.5)
CHLORIDE SERPL-SCNC: 107 MMOL/L (ref 96–112)
CO2 SERPL-SCNC: 22 MMOL/L (ref 20–33)
CREAT SERPL-MCNC: 0.9 MG/DL (ref 0.5–1.4)
EOSINOPHIL # BLD AUTO: 0 K/UL (ref 0–0.51)
EOSINOPHIL NFR BLD: 0 % (ref 0–6.9)
ERYTHROCYTE [DISTWIDTH] IN BLOOD BY AUTOMATED COUNT: 46.7 FL (ref 35.9–50)
GLUCOSE SERPL-MCNC: 114 MG/DL (ref 65–99)
HCT VFR BLD AUTO: 43.5 % (ref 42–52)
HGB BLD-MCNC: 14.4 G/DL (ref 14–18)
IMM GRANULOCYTES # BLD AUTO: 0.05 K/UL (ref 0–0.11)
IMM GRANULOCYTES NFR BLD AUTO: 0.4 % (ref 0–0.9)
LYMPHOCYTES # BLD AUTO: 1.61 K/UL (ref 1–4.8)
LYMPHOCYTES NFR BLD: 12.7 % (ref 22–41)
MCH RBC QN AUTO: 31.9 PG (ref 27–33)
MCHC RBC AUTO-ENTMCNC: 33.1 G/DL (ref 33.7–35.3)
MCV RBC AUTO: 96.2 FL (ref 81.4–97.8)
MONOCYTES # BLD AUTO: 0.7 K/UL (ref 0–0.85)
MONOCYTES NFR BLD AUTO: 5.5 % (ref 0–13.4)
NEUTROPHILS # BLD AUTO: 10.34 K/UL (ref 1.82–7.42)
NEUTROPHILS NFR BLD: 81.2 % (ref 44–72)
NRBC # BLD AUTO: 0 K/UL
NRBC BLD-RTO: 0 /100 WBC
PHOSPHATE SERPL-MCNC: 3 MG/DL (ref 2.5–4.5)
PLATELET # BLD AUTO: 153 K/UL (ref 164–446)
PMV BLD AUTO: 9.2 FL (ref 9–12.9)
POTASSIUM SERPL-SCNC: 3.8 MMOL/L (ref 3.6–5.5)
RBC # BLD AUTO: 4.52 M/UL (ref 4.7–6.1)
SODIUM SERPL-SCNC: 137 MMOL/L (ref 135–145)
WBC # BLD AUTO: 12.7 K/UL (ref 4.8–10.8)

## 2019-09-25 PROCEDURE — 36415 COLL VENOUS BLD VENIPUNCTURE: CPT

## 2019-09-25 PROCEDURE — A9270 NON-COVERED ITEM OR SERVICE: HCPCS | Performed by: INTERNAL MEDICINE

## 2019-09-25 PROCEDURE — 84100 ASSAY OF PHOSPHORUS: CPT

## 2019-09-25 PROCEDURE — 700111 HCHG RX REV CODE 636 W/ 250 OVERRIDE (IP): Performed by: INTERNAL MEDICINE

## 2019-09-25 PROCEDURE — 700105 HCHG RX REV CODE 258: Performed by: INTERNAL MEDICINE

## 2019-09-25 PROCEDURE — G0378 HOSPITAL OBSERVATION PER HR: HCPCS

## 2019-09-25 PROCEDURE — 85025 COMPLETE CBC W/AUTO DIFF WBC: CPT

## 2019-09-25 PROCEDURE — 99217 PR OBSERVATION CARE DISCHARGE: CPT | Performed by: INTERNAL MEDICINE

## 2019-09-25 PROCEDURE — 700102 HCHG RX REV CODE 250 W/ 637 OVERRIDE(OP): Performed by: INTERNAL MEDICINE

## 2019-09-25 PROCEDURE — 80048 BASIC METABOLIC PNL TOTAL CA: CPT

## 2019-09-25 RX ADMIN — ACETAMINOPHEN 650 MG: 325 TABLET, FILM COATED ORAL at 01:30

## 2019-09-25 RX ADMIN — POTASSIUM CHLORIDE: 149 INJECTION, SOLUTION, CONCENTRATE INTRAVENOUS at 04:58

## 2019-09-25 NOTE — PROGRESS NOTES
Pt currently sleeping. Wife at bedside. Requesting to come back for vitals and assessment when awake. SR on the monitor. On RA over 92% sao2, respirations equal and unlabored.

## 2019-09-25 NOTE — PROGRESS NOTES
Patient connected to monitors and assessed. Patient is A/O x4 and denies any pain at this time. Call light education provided.

## 2019-09-25 NOTE — PROGRESS NOTES
Pt restless in bed. Chest is hairy as well and refusing to be shaved right now. Wants to be off monitor for now so he can rest.  Agrees to be reconnected again at around 5am. No dizziness. No chest pain, no heart history. Vitals WNL.

## 2019-09-25 NOTE — DISCHARGE INSTRUCTIONS
Discharge Instructions per JONNY Pompa.    Please follow up with PCP- Continue to monitor incidental finding of lung nodule, may need repeat imaging in 12 months per guidelines   Stay well hydrated   DIET: As tolerated- if nausea returns, go back to liquid diet   ACTIVITY: As tolerated   DIAGNOSIS: Gastroenteritis   Return to ER if symptoms return, unable to tolerated by mouth intake, high fever, severe abdominal pain     Discharge Instructions    Discharged to home by car with relative. Discharged via wheelchair, hospital escort: Yes.  Special equipment needed: Not Applicable    Be sure to schedule a follow-up appointment with your primary care doctor or any specialists as instructed.     Discharge Plan:   Diet Plan: Discussed  Activity Level: Discussed  Smoking Cessation Offered: Patient Counseled  Confirmed Follow up Appointment: Appointment Scheduled  Confirmed Symptoms Management: Discussed  Medication Reconciliation Updated: Yes  Influenza Vaccine Indication: Patient Refuses    I understand that a diet low in cholesterol, fat, and sodium is recommended for good health. Unless I have been given specific instructions below for another diet, I accept this instruction as my diet prescription.   Other diet: heart healthy diet    Special Instructions: None    · Is patient discharged on Warfarin / Coumadin?   No     Depression / Suicide Risk    As you are discharged from this [facility] facility, it is important to learn how to keep safe from harming yourself.    Recognize the warning signs:  · Abrupt changes in personality, positive or negative- including increase in energy   · Giving away possessions  · Change in eating patterns- significant weight changes-  positive or negative  · Change in sleeping patterns- unable to sleep or sleeping all the time   · Unwillingness or inability to communicate  · Depression  · Unusual sadness, discouragement and loneliness  · Talk of wanting to die  · Neglect  of personal appearance   · Rebelliousness- reckless behavior  · Withdrawal from people/activities they love  · Confusion- inability to concentrate     If you or a loved one observes any of these behaviors or has concerns about self-harm, here's what you can do:  · Talk about it- your feelings and reasons for harming yourself  · Remove any means that you might use to hurt yourself (examples: pills, rope, extension cords, firearm)  · Get professional help from the community (Mental Health, Substance Abuse, psychological counseling)  · Do not be alone:Call your Safe Contact- someone whom you trust who will be there for you.  · Call your local CRISIS HOTLINE 668-8968 or 365-409-7107  · Call your local Children's Mobile Crisis Response Team [place] (463) 337-1209 or www.[facility]ba  · Call the toll free National Suicide Prevention Hotlines   · National Suicide Prevention Lifeline 801-533-GBCV (5309)  · National Hope Line Network 800-SUICIDE (381-9538)

## 2019-09-25 NOTE — PROGRESS NOTES
IV removed. Discharge instructions provided and patient verbalizes understanding. Patient states that all question have been answered. Copy of discharge provided to patient and signed. Patient states that all personal items are in possess. Patient and family chose to establish PCP on their own. Patient provided information to [facility] internal medicine group.

## 2019-09-25 NOTE — DISCHARGE SUMMARY
Discharge Summary    CHIEF COMPLAINT ON ADMISSION  Chief Complaint   Patient presents with   • Nausea     and diarrhea beginning this am   • Abdominal Pain   • Dizziness       Reason for Admission  EMS     Admission Date  9/24/2019    CODE STATUS  Full Code    HPI & HOSPITAL COURSE  This is a 63 y.o. male here with nausea, vomiting, abdominal pain and diarrhea.  Patient has no significant medical history.  Patient presented on 9/24/2019 with complaints of 3 episodes of nonbloody, watery diarrhea followed by nausea and episodes of vomiting and dry heaving.  Patient also started to have 10 out of 10 abdominal pain which was diffuse and described as cramping and constant.  This caused him some concern as he started to feel short of breath when he presented to the emergency room for further evaluation.  Patient was admitted to CDU for further work-up and monitoring.  Initial WBCs elevated at 11.6 with left shift but no bandemia.  BMP was obtained and remarkable for slightly decreased potassium at 3.4 and phosphate of less than 1.0.  Patient was given electrolyte replacement as well as IV fluid hydration.  Symptomatic management with antiemetics and analgesics as needed.  LFTs were noted to be normal.  CT of the abdomen and pelvis was completed which revealed no evidence of bowel obstruction or focal inflammatory change, right renal cyst noted, and 4 mm indeterminate pulmonary nodule within the left lung base incidental finding.  Per Fleischner Society's recommendation and nodule being less than 6 mm patient is recommended to follow-up with PCP for possible repeat imaging in 12 months.  Urinalysis was obtained and negative.  Patient remained afebrile.  Patient was initiated on clear liquid diet and advanced as tolerated.  On exam this morning patient states he feels 100% better and all of his symptoms have resolved.  Patient has no further complaints of abdominal pain, nausea, vomiting or diarrhea.  Patient's  electrolytes have normalized.  Patient's vital signs are stable and patient is remained afebrile.  Patient's white count has slightly increased however likely reactive.  Patient states he feels he is ready to discharge at this time and will follow up with his PCP.  Encourage patient to stay well-hydrated.  Patient is up ambulating independently.       Therefore, he is discharged in good and stable condition to home with close outpatient follow-up.        Discharge Date  9/25/2019    FOLLOW UP ITEMS POST DISCHARGE  Please follow up with PCP- Continue to monitor incidental finding of lung nodule, may need repeat imaging in 12 months per guidelines   Stay well hydrated     DISCHARGE DIAGNOSES  Active Problems:    Acute gastroenteritis POA: Yes    Hypophosphatemia POA: Yes    Hypokalemia POA: Yes    Hyperglycemia POA: Yes    Incidental lung nodule POA: Yes  Resolved Problems:    * No resolved hospital problems. *      FOLLOW UP      [facility]  [address] [facility] [address]  [place] [address]  [phone]  Go in 1 week        MEDICATIONS ON DISCHARGE     Medication List      CONTINUE taking these medications      Instructions   atorvastatin 40 MG Tabs  Commonly known as:  LIPITOR   Take 40 mg by mouth every evening.  Dose:  40 mg     escitalopram 20 MG tablet  Commonly known as:  LEXAPRO   Take 20 mg by mouth every day.  Dose:  20 mg     HYDROcodone-acetaminophen 5-325 MG Tabs per tablet  Commonly known as:  NORCO   Take 1 Tab by mouth every four hours as needed. Indications: Pain  Dose:  1 Tab     QUEtiapine 25 MG Tabs  Commonly known as:  SEROQUEL   Take 25 mg by mouth every evening.  Dose:  25 mg            Allergies  No Known Allergies    DIET  Orders Placed This Encounter   Procedures   • Diet Order Clear Liquid     Standing Status:   Standing     Number of Occurrences:   1     Order Specific Question:   Diet:     Answer:   Clear Liquid [10]       ACTIVITY  As tolerated.  Weight bearing as  tolerated    CONSULTATIONS  None     PROCEDURES  None     LABORATORY  Lab Results   Component Value Date    SODIUM 137 09/25/2019    POTASSIUM 3.8 09/25/2019    CHLORIDE 107 09/25/2019    CO2 22 09/25/2019    GLUCOSE 114 (H) 09/25/2019    BUN 7 (L) 09/25/2019    CREATININE 0.90 09/25/2019        Lab Results   Component Value Date    WBC 12.7 (H) 09/25/2019    HEMOGLOBIN 14.4 09/25/2019    HEMATOCRIT 43.5 09/25/2019    PLATELETCT 153 (L) 09/25/2019

## 2019-09-26 ENCOUNTER — HOSPITAL ENCOUNTER (INPATIENT)
Facility: MEDICAL CENTER | Age: 64
LOS: 3 days | DRG: 642 | End: 2019-09-30
Attending: EMERGENCY MEDICINE | Admitting: HOSPITALIST
Payer: MEDICARE

## 2019-09-26 ENCOUNTER — APPOINTMENT (OUTPATIENT)
Dept: RADIOLOGY | Facility: MEDICAL CENTER | Age: 64
DRG: 642 | End: 2019-09-26
Attending: HOSPITALIST
Payer: MEDICARE

## 2019-09-26 DIAGNOSIS — E83.39 HYPOPHOSPHATEMIA: ICD-10-CM

## 2019-09-26 DIAGNOSIS — E87.6 HYPOKALEMIA: ICD-10-CM

## 2019-09-26 PROBLEM — D72.829 LEUKOCYTOSIS: Status: ACTIVE | Noted: 2019-09-26

## 2019-09-26 PROBLEM — R11.2 NAUSEA & VOMITING: Status: ACTIVE | Noted: 2019-09-26

## 2019-09-26 PROBLEM — R74.8 ELEVATED ALKALINE PHOSPHATASE LEVEL: Status: ACTIVE | Noted: 2019-09-26

## 2019-09-26 LAB
25(OH)D3 SERPL-MCNC: 20 NG/ML (ref 30–100)
ALBUMIN SERPL BCP-MCNC: 4.8 G/DL (ref 3.2–4.9)
ALBUMIN/GLOB SERPL: 2.1 G/DL
ALP SERPL-CCNC: 126 U/L (ref 30–99)
ALT SERPL-CCNC: 20 U/L (ref 2–50)
ANION GAP SERPL CALC-SCNC: 15 MMOL/L (ref 0–11.9)
APPEARANCE UR: CLEAR
AST SERPL-CCNC: 23 U/L (ref 12–45)
BASOPHILS # BLD AUTO: 0.4 % (ref 0–1.8)
BASOPHILS # BLD: 0.04 K/UL (ref 0–0.12)
BILIRUB SERPL-MCNC: 1 MG/DL (ref 0.1–1.5)
BILIRUB UR QL STRIP.AUTO: NEGATIVE
BUN SERPL-MCNC: 10 MG/DL (ref 8–22)
CA-I SERPL-SCNC: 1.1 MMOL/L (ref 1.1–1.3)
CALCIUM SERPL-MCNC: 9.7 MG/DL (ref 8.5–10.5)
CHLORIDE SERPL-SCNC: 104 MMOL/L (ref 96–112)
CHLORIDE UR-SCNC: 133 MMOL/L
CO2 SERPL-SCNC: 22 MMOL/L (ref 20–33)
COLOR UR: YELLOW
CREAT SERPL-MCNC: 1.09 MG/DL (ref 0.5–1.4)
CREAT UR-MCNC: 26.2 MG/DL
EOSINOPHIL # BLD AUTO: 0 K/UL (ref 0–0.51)
EOSINOPHIL NFR BLD: 0 % (ref 0–6.9)
ERYTHROCYTE [DISTWIDTH] IN BLOOD BY AUTOMATED COUNT: 46.1 FL (ref 35.9–50)
GLOBULIN SER CALC-MCNC: 2.3 G/DL (ref 1.9–3.5)
GLUCOSE SERPL-MCNC: 125 MG/DL (ref 65–99)
GLUCOSE UR STRIP.AUTO-MCNC: NEGATIVE MG/DL
HCT VFR BLD AUTO: 46.3 % (ref 42–52)
HGB BLD-MCNC: 16.3 G/DL (ref 14–18)
IMM GRANULOCYTES # BLD AUTO: 0.03 K/UL (ref 0–0.11)
IMM GRANULOCYTES NFR BLD AUTO: 0.3 % (ref 0–0.9)
KETONES UR STRIP.AUTO-MCNC: NEGATIVE MG/DL
LEUKOCYTE ESTERASE UR QL STRIP.AUTO: NEGATIVE
LIPASE SERPL-CCNC: 24 U/L (ref 11–82)
LYMPHOCYTES # BLD AUTO: 2.79 K/UL (ref 1–4.8)
LYMPHOCYTES NFR BLD: 25.2 % (ref 22–41)
MAGNESIUM SERPL-MCNC: 1.6 MG/DL (ref 1.5–2.5)
MCH RBC QN AUTO: 33.3 PG (ref 27–33)
MCHC RBC AUTO-ENTMCNC: 35.2 G/DL (ref 33.7–35.3)
MCV RBC AUTO: 94.7 FL (ref 81.4–97.8)
MICRO URNS: NORMAL
MONOCYTES # BLD AUTO: 0.72 K/UL (ref 0–0.85)
MONOCYTES NFR BLD AUTO: 6.5 % (ref 0–13.4)
NEUTROPHILS # BLD AUTO: 7.48 K/UL (ref 1.82–7.42)
NEUTROPHILS NFR BLD: 67.6 % (ref 44–72)
NITRITE UR QL STRIP.AUTO: NEGATIVE
NRBC # BLD AUTO: 0 K/UL
NRBC BLD-RTO: 0 /100 WBC
PH UR STRIP.AUTO: 7 [PH] (ref 5–8)
PHOSPHATE SERPL-MCNC: <1 MG/DL (ref 2.5–4.5)
PLATELET # BLD AUTO: 171 K/UL (ref 164–446)
PMV BLD AUTO: 9.4 FL (ref 9–12.9)
POTASSIUM SERPL-SCNC: 3.1 MMOL/L (ref 3.6–5.5)
POTASSIUM UR-SCNC: 12.5 MMOL/L
PROT SERPL-MCNC: 7.1 G/DL (ref 6–8.2)
PROT UR QL STRIP: NEGATIVE MG/DL
PROT UR-MCNC: 5.1 MG/DL (ref 0–15)
PTH-INTACT SERPL-MCNC: 64.9 PG/ML (ref 14–72)
RBC # BLD AUTO: 4.89 M/UL (ref 4.7–6.1)
RBC UR QL AUTO: NEGATIVE
SODIUM SERPL-SCNC: 141 MMOL/L (ref 135–145)
SODIUM UR-SCNC: 135 MMOL/L
SP GR UR STRIP.AUTO: 1.01
UROBILINOGEN UR STRIP.AUTO-MCNC: 0.2 MG/DL
WBC # BLD AUTO: 11.1 K/UL (ref 4.8–10.8)

## 2019-09-26 PROCEDURE — 96366 THER/PROPH/DIAG IV INF ADDON: CPT

## 2019-09-26 PROCEDURE — 83036 HEMOGLOBIN GLYCOSYLATED A1C: CPT

## 2019-09-26 PROCEDURE — 700117 HCHG RX CONTRAST REV CODE 255: Performed by: HOSPITALIST

## 2019-09-26 PROCEDURE — 700111 HCHG RX REV CODE 636 W/ 250 OVERRIDE (IP): Performed by: HOSPITALIST

## 2019-09-26 PROCEDURE — 99285 EMERGENCY DEPT VISIT HI MDM: CPT

## 2019-09-26 PROCEDURE — 700101 HCHG RX REV CODE 250: Performed by: HOSPITALIST

## 2019-09-26 PROCEDURE — 83970 ASSAY OF PARATHORMONE: CPT

## 2019-09-26 PROCEDURE — 700105 HCHG RX REV CODE 258: Performed by: EMERGENCY MEDICINE

## 2019-09-26 PROCEDURE — 700102 HCHG RX REV CODE 250 W/ 637 OVERRIDE(OP): Performed by: HOSPITALIST

## 2019-09-26 PROCEDURE — 82306 VITAMIN D 25 HYDROXY: CPT

## 2019-09-26 PROCEDURE — 36415 COLL VENOUS BLD VENIPUNCTURE: CPT

## 2019-09-26 PROCEDURE — 84156 ASSAY OF PROTEIN URINE: CPT

## 2019-09-26 PROCEDURE — 96375 TX/PRO/DX INJ NEW DRUG ADDON: CPT

## 2019-09-26 PROCEDURE — 87040 BLOOD CULTURE FOR BACTERIA: CPT | Mod: 91

## 2019-09-26 PROCEDURE — 99220 PR INITIAL OBSERVATION CARE,LEVL III: CPT | Performed by: HOSPITALIST

## 2019-09-26 PROCEDURE — 84100 ASSAY OF PHOSPHORUS: CPT

## 2019-09-26 PROCEDURE — A9270 NON-COVERED ITEM OR SERVICE: HCPCS | Performed by: HOSPITALIST

## 2019-09-26 PROCEDURE — 82436 ASSAY OF URINE CHLORIDE: CPT

## 2019-09-26 PROCEDURE — 80053 COMPREHEN METABOLIC PANEL: CPT

## 2019-09-26 PROCEDURE — 82330 ASSAY OF CALCIUM: CPT

## 2019-09-26 PROCEDURE — 96365 THER/PROPH/DIAG IV INF INIT: CPT

## 2019-09-26 PROCEDURE — 85025 COMPLETE CBC W/AUTO DIFF WBC: CPT

## 2019-09-26 PROCEDURE — 82570 ASSAY OF URINE CREATININE: CPT

## 2019-09-26 PROCEDURE — 83735 ASSAY OF MAGNESIUM: CPT

## 2019-09-26 PROCEDURE — 83690 ASSAY OF LIPASE: CPT

## 2019-09-26 PROCEDURE — 84133 ASSAY OF URINE POTASSIUM: CPT

## 2019-09-26 PROCEDURE — 76775 US EXAM ABDO BACK WALL LIM: CPT

## 2019-09-26 PROCEDURE — 82340 ASSAY OF CALCIUM IN URINE: CPT

## 2019-09-26 PROCEDURE — 84300 ASSAY OF URINE SODIUM: CPT

## 2019-09-26 PROCEDURE — 81003 URINALYSIS AUTO W/O SCOPE: CPT

## 2019-09-26 PROCEDURE — 96376 TX/PRO/DX INJ SAME DRUG ADON: CPT

## 2019-09-26 PROCEDURE — G0378 HOSPITAL OBSERVATION PER HR: HCPCS

## 2019-09-26 PROCEDURE — 71260 CT THORAX DX C+: CPT

## 2019-09-26 PROCEDURE — 700101 HCHG RX REV CODE 250: Performed by: EMERGENCY MEDICINE

## 2019-09-26 PROCEDURE — 700105 HCHG RX REV CODE 258: Performed by: HOSPITALIST

## 2019-09-26 RX ORDER — CEPHALEXIN 500 MG/1
500 CAPSULE ORAL 3 TIMES DAILY
Status: ON HOLD | COMMUNITY
Start: 2019-09-19 | End: 2019-09-30

## 2019-09-26 RX ORDER — CALCIUM CARBONATE 500 MG/1
500 TABLET, CHEWABLE ORAL ONCE
Status: COMPLETED | OUTPATIENT
Start: 2019-09-26 | End: 2019-09-26

## 2019-09-26 RX ORDER — ACETAMINOPHEN 325 MG/1
650 TABLET ORAL EVERY 6 HOURS PRN
Status: DISCONTINUED | OUTPATIENT
Start: 2019-09-26 | End: 2019-09-30 | Stop reason: HOSPADM

## 2019-09-26 RX ORDER — ONDANSETRON 2 MG/ML
4 INJECTION INTRAMUSCULAR; INTRAVENOUS EVERY 4 HOURS PRN
Status: DISCONTINUED | OUTPATIENT
Start: 2019-09-26 | End: 2019-09-30 | Stop reason: HOSPADM

## 2019-09-26 RX ORDER — ESCITALOPRAM OXALATE 10 MG/1
20 TABLET ORAL EVERY EVENING
Status: DISCONTINUED | OUTPATIENT
Start: 2019-09-26 | End: 2019-09-30 | Stop reason: HOSPADM

## 2019-09-26 RX ORDER — BISACODYL 10 MG
10 SUPPOSITORY, RECTAL RECTAL
Status: DISCONTINUED | OUTPATIENT
Start: 2019-09-26 | End: 2019-09-30 | Stop reason: HOSPADM

## 2019-09-26 RX ORDER — AMOXICILLIN 250 MG
2 CAPSULE ORAL 2 TIMES DAILY
Status: DISCONTINUED | OUTPATIENT
Start: 2019-09-26 | End: 2019-09-30 | Stop reason: HOSPADM

## 2019-09-26 RX ORDER — ATORVASTATIN CALCIUM 40 MG/1
40 TABLET, FILM COATED ORAL EVERY EVENING
Status: DISCONTINUED | OUTPATIENT
Start: 2019-09-26 | End: 2019-09-30 | Stop reason: HOSPADM

## 2019-09-26 RX ORDER — ONDANSETRON 4 MG/1
4 TABLET, ORALLY DISINTEGRATING ORAL EVERY 4 HOURS PRN
Status: DISCONTINUED | OUTPATIENT
Start: 2019-09-26 | End: 2019-09-30 | Stop reason: HOSPADM

## 2019-09-26 RX ORDER — HYDRALAZINE HYDROCHLORIDE 20 MG/ML
10 INJECTION INTRAMUSCULAR; INTRAVENOUS ONCE
Status: COMPLETED | OUTPATIENT
Start: 2019-09-26 | End: 2019-09-26

## 2019-09-26 RX ORDER — QUETIAPINE FUMARATE 25 MG/1
25 TABLET, FILM COATED ORAL EVERY EVENING
Status: DISCONTINUED | OUTPATIENT
Start: 2019-09-26 | End: 2019-09-30 | Stop reason: HOSPADM

## 2019-09-26 RX ORDER — LABETALOL HYDROCHLORIDE 5 MG/ML
10 INJECTION, SOLUTION INTRAVENOUS EVERY 4 HOURS PRN
Status: DISCONTINUED | OUTPATIENT
Start: 2019-09-26 | End: 2019-09-30 | Stop reason: HOSPADM

## 2019-09-26 RX ORDER — POLYETHYLENE GLYCOL 3350 17 G/17G
1 POWDER, FOR SOLUTION ORAL
Status: DISCONTINUED | OUTPATIENT
Start: 2019-09-26 | End: 2019-09-30 | Stop reason: HOSPADM

## 2019-09-26 RX ORDER — PROCHLORPERAZINE EDISYLATE 5 MG/ML
10 INJECTION INTRAMUSCULAR; INTRAVENOUS EVERY 6 HOURS PRN
Status: DISCONTINUED | OUTPATIENT
Start: 2019-09-26 | End: 2019-09-30 | Stop reason: HOSPADM

## 2019-09-26 RX ORDER — SODIUM CHLORIDE AND POTASSIUM CHLORIDE 150; 900 MG/100ML; MG/100ML
INJECTION, SOLUTION INTRAVENOUS CONTINUOUS
Status: DISCONTINUED | OUTPATIENT
Start: 2019-09-26 | End: 2019-09-27

## 2019-09-26 RX ADMIN — ESCITALOPRAM OXALATE 20 MG: 10 TABLET ORAL at 22:55

## 2019-09-26 RX ADMIN — ANTACID TABLETS 500 MG: 500 TABLET, CHEWABLE ORAL at 20:57

## 2019-09-26 RX ADMIN — ATORVASTATIN CALCIUM 40 MG: 40 TABLET, FILM COATED ORAL at 22:55

## 2019-09-26 RX ADMIN — SODIUM PHOSPHATE, MONOBASIC, MONOHYDRATE AND SODIUM PHOSPHATE, DIBASIC, ANHYDROUS 30 MMOL: 276; 142 INJECTION, SOLUTION INTRAVENOUS at 21:46

## 2019-09-26 RX ADMIN — HYDRALAZINE HYDROCHLORIDE 10 MG: 20 INJECTION INTRAMUSCULAR; INTRAVENOUS at 22:15

## 2019-09-26 RX ADMIN — PROCHLORPERAZINE EDISYLATE 10 MG: 5 INJECTION INTRAMUSCULAR; INTRAVENOUS at 22:12

## 2019-09-26 RX ADMIN — HYDRALAZINE HYDROCHLORIDE 10 MG: 20 INJECTION INTRAMUSCULAR; INTRAVENOUS at 20:58

## 2019-09-26 RX ADMIN — POTASSIUM CHLORIDE AND SODIUM CHLORIDE: 900; 150 INJECTION, SOLUTION INTRAVENOUS at 21:40

## 2019-09-26 RX ADMIN — IOHEXOL 75 ML: 350 INJECTION, SOLUTION INTRAVENOUS at 20:15

## 2019-09-26 RX ADMIN — QUETIAPINE FUMARATE 25 MG: 25 TABLET ORAL at 22:55

## 2019-09-26 RX ADMIN — ONDANSETRON 4 MG: 2 INJECTION INTRAMUSCULAR; INTRAVENOUS at 18:30

## 2019-09-26 RX ADMIN — POTASSIUM PHOSPHATE, MONOBASIC AND POTASSIUM PHOSPHATE, DIBASIC 30 MMOL: 224; 236 INJECTION, SOLUTION, CONCENTRATE INTRAVENOUS at 17:33

## 2019-09-26 ASSESSMENT — ENCOUNTER SYMPTOMS
MUSCULOSKELETAL NEGATIVE: 1
NERVOUS/ANXIOUS: 0
FEVER: 0
COUGH: 0
FOCAL WEAKNESS: 0
BRUISES/BLEEDS EASILY: 0
RESPIRATORY NEGATIVE: 1
PALPITATIONS: 0
LOSS OF CONSCIOUSNESS: 0
HEMOPTYSIS: 0
DIARRHEA: 0
CONSTIPATION: 0
DIAPHORESIS: 0
WHEEZING: 0
DIZZINESS: 0
NAUSEA: 1
EYES NEGATIVE: 1
CARDIOVASCULAR NEGATIVE: 1
HEADACHES: 0
DOUBLE VISION: 0
ABDOMINAL PAIN: 0
NEUROLOGICAL NEGATIVE: 1
VOMITING: 1
HEARTBURN: 0
SEIZURES: 0
PSYCHIATRIC NEGATIVE: 1
BLOOD IN STOOL: 0
CHILLS: 0

## 2019-09-26 ASSESSMENT — LIFESTYLE VARIABLES
HOW MANY TIMES IN THE PAST YEAR HAVE YOU HAD 5 OR MORE DRINKS IN A DAY: 0
ON A TYPICAL DAY WHEN YOU DRINK ALCOHOL HOW MANY DRINKS DO YOU HAVE: 0
DOES PATIENT WANT TO STOP DRINKING: NO
EVER HAD A DRINK FIRST THING IN THE MORNING TO STEADY YOUR NERVES TO GET RID OF A HANGOVER: NO
HAVE YOU EVER FELT YOU SHOULD CUT DOWN ON YOUR DRINKING: NO
AVERAGE NUMBER OF DAYS PER WEEK YOU HAVE A DRINK CONTAINING ALCOHOL: 0
HAVE PEOPLE ANNOYED YOU BY CRITICIZING YOUR DRINKING: NO
TOTAL SCORE: 0
ALCOHOL_USE: NO
EVER_SMOKED: YES
TOTAL SCORE: 0
CONSUMPTION TOTAL: NEGATIVE
TOTAL SCORE: 0
EVER FELT BAD OR GUILTY ABOUT YOUR DRINKING: NO

## 2019-09-26 ASSESSMENT — COGNITIVE AND FUNCTIONAL STATUS - GENERAL
DAILY ACTIVITIY SCORE: 24
SUGGESTED CMS G CODE MODIFIER MOBILITY: CH
MOBILITY SCORE: 24
SUGGESTED CMS G CODE MODIFIER DAILY ACTIVITY: CH

## 2019-09-26 ASSESSMENT — PATIENT HEALTH QUESTIONNAIRE - PHQ9
2. FEELING DOWN, DEPRESSED, IRRITABLE, OR HOPELESS: NOT AT ALL
1. LITTLE INTEREST OR PLEASURE IN DOING THINGS: NOT AT ALL
SUM OF ALL RESPONSES TO PHQ9 QUESTIONS 1 AND 2: 0

## 2019-09-26 NOTE — ASSESSMENT & PLAN NOTE
Leukocytosis is mild at this point may be stress response versus underlying infection.  Patient without fevers .  Patient recent diagnosis of gastroenteritis--symptoms resolving--  will monitor for recurrent/acute symptoms .    Patient UA negative.  Blood cultures negative to date.  Trended down.

## 2019-09-26 NOTE — ED PROVIDER NOTES
ED Provider Note    HPI: Patient is a 63-year-old male who presented to the emergency department September 26, 2019 at 12:09 PM with a chief complaint of crampy abdominal pain and shaking.    Patient was discharged yesterday after being admitted for electrolyte abnormalities of uncertain etiology.  These were corrected and he felt better.  Today he went for a run and felt great and then at about 11 AM started having the above complaints.  He has not had a fever chills or cough is had nausea no vomiting.  No chest pain no shortness of breath.  He has had no diarrhea.  No change in bladder habits.  No other somatic complaints    Review of Systems: Positive for crampy abdominal pain and shaking negative for fever chills cough nausea vomiting chest pain shortness of breath diarrhea change in bladder habits.  Review of systems reviewed with patient, all other systems negative    Past medical/surgical history: As above psychiatric issues    Medications: Lipitor Seroquel Lexapro Norco    Allergies: None    Social History: Patient smokes cigarettes no alcohol      Physical exam: Constitutional: Pleasant male awake alert appeared tired  Vital signs: Temperature 97.7 pulse 71 respirations 16 blood pressure 175/99 pulse oximetry 98% repeat pulse is 47 but the patient was asymptomatic  EYES: PERRL, EOMI, Conjunctivae and sclera normal, eyelids normal bilaterally.  Neck: Trachea midline. No cervical masses seen or palpated. Normal range of motion, supple. No meningeal signs elicited.  Cardiac: Regular rate and rhythm. S1-S2 present. No S3 or S4 present. No murmurs, rubs, or gallops heard. No edema or varicosities were seen.   Lungs: Clear to auscultation with good aeration throughout. No wheezes, rales, or rhonchi heard. Patient's chest wall moved symmetrically with each respiratory effort. Patient was not making use of accessory muscles of respiration in breathing.  Abdomen: Soft nontender to palpation. No rebound or guarding  elicited. No organomegaly identified. No pulsatile abdominal masses identified.   Musculoskeletal:  no  pain with palpitation or movement of muscle, bone or joint , no obvious musculoskeletal deformities identified.  Neurologic: alert and awake answers questions appropriately. Moves all four extremities independently, no gross focal abnormalities identified. Normal strength and motor.  Skin: no rash or lesion seen, no palpable dermatologic lesions identified.  Psychiatric: not anxious, delusional, or hallucinating.    Medical decision making: Laboratory studies obtained (please see lab sheet for all results) significant findings include mild leukocytosis 11.1.  Mild hypokalemia is present at 3.1.  Phosphorus is essentially unmeasurable.  Alk phos is elevated at 126.    Potassium replenishment is undertaken per pharmacy protocol.  Patient be admitted by the hospitalist service.  Patient has no obvious cause for this occurring and I do note his potassium is dropping since discharge.  Certainly some sort of primary renal problem seems high in the differential diagnosis.  Other possibilities would be some sort of atypical problem with his medical regimen.  At this time he does not appear to be septic or unstable.  Further care and hospital course per attending physician summary    Impression 1) hypophosphatemia  2) hypokalemia

## 2019-09-26 NOTE — ED TRIAGE NOTES
"Pt discharged yesterday from inpatient for \"viral\". Pt went for a run this AM, \"felt great\". Pt now c/o same symptoms, abd pain, and shaking. Pt and family anxious in triage.   "

## 2019-09-26 NOTE — ASSESSMENT & PLAN NOTE
Of unclear etiology.  Recent diagnosis of gastroenteritis.  Abdominal exam benign.  LFTs and lipase normal-unlikely pancreas biliary source.  Patient was very hypertensive initially- ? Associated symptomatic hypertension , electrolyte depletion symptoms.  Resolved   Monitor

## 2019-09-26 NOTE — H&P
Hospital Medicine History & Physical Note    Date of Service  2019    Primary Care Physician  Pcp Pt States None    Consultants  None    Code Status  Full code    Chief Complaint  Weakness with nausea and vomiting.    History of Presenting Illness  63 y.o. male who presented 2019 with recurrent episodes of generalized weakness.  The patient apparently has been found on previous admission which was recently to have abnormally low phosphorus levels.  Apparently the patient had been replaced with the phosphorus and then discharged home.  The patient at this point comes back after going for a jog today.  The patient apparently had some generalized weakness which brought him into the emergency room.  When he got here he had an episode of nausea vomiting.  Patient was evaluated in the emergency room found to have extremely low phosphorus levels.  At this point the patient will need a complete work-up and to why his phosphorus is low.  The patient states that it is likely to be a nutritional situation as he eats well.  His albumin levels are actually excellent.  Patient goes into the sun and does repeated exercises.  Unlikely that the patient has vitamin D abnormality but certainly possible he lost a vitamin D level checked.  The patient also has potential for parathyroid abnormality there is a PTH level with ionized calcium will be checked.  Less likely that the patient has a cancerous process as CAT scan of the abdomen pelvis and partial chest were checked and there is no abnormal finding except for mild cyst in the right kidney as well as in the liver.  The patient is not a smoker and he did have a 4 mm lung nodule but that is less likely to be of significance without it being a smoker.  Patient has family history of cancer in the mother with breast cancer that was metastatic and father  at age 97 of old age.  Doubt any significant cancer history would be for him.  The patient will need renal evaluation  the sedating of ultrasound the kidney as well as your lites and 24-hour urine phosphorus and calcium levels.  Patient's blood sugars have been elevating and it is potentially possible that he has abnormal gluconeogenesis causing the low phosphorus thus we will check a hemoglobin A1c as well as serial glucose levels and I am going to check a CA 19-9 level.    Review of Systems  Review of Systems   Constitutional: Positive for malaise/fatigue. Negative for chills, diaphoresis and fever.   HENT: Negative.    Eyes: Negative.  Negative for double vision.   Respiratory: Negative.  Negative for cough, hemoptysis and wheezing.    Cardiovascular: Negative.  Negative for chest pain, palpitations and leg swelling.   Gastrointestinal: Positive for nausea and vomiting. Negative for abdominal pain, blood in stool, constipation, diarrhea and heartburn.   Genitourinary: Positive for frequency. Negative for hematuria and urgency.   Musculoskeletal: Negative.  Negative for joint pain.   Skin: Negative for itching and rash.        Recent resection of scalp lesion   Neurological: Negative.  Negative for dizziness, focal weakness, seizures, loss of consciousness and headaches.   Endo/Heme/Allergies: Negative.  Does not bruise/bleed easily.   Psychiatric/Behavioral: Negative.  Negative for suicidal ideas. The patient is not nervous/anxious.    All other systems reviewed and are negative.      Past Medical History  Patient reports past medical history of dyslipidemia as well as mood disorder.    Surgical History  Reports no surgeries in the past except for recent resection of a scalp lesion.  Pathology of this is pending.    Family History  Reports mother had breast cancer.  Father  of old age at age 97.  Patient's    Social History   reports that he has quit smoking. His smoking use included cigarettes. He has never used smokeless tobacco. He reports that he drank alcohol. He reports that he has current or past drug history. Drugs:  Marijuana and Inhaled.    Allergies  No Known Allergies    Medications  Prior to Admission Medications   Prescriptions Last Dose Informant Patient Reported? Taking?   QUEtiapine (SEROQUEL) 25 MG Tab 9/25/2019 at pm Patient Yes No   Sig: Take 25 mg by mouth every evening.   atorvastatin (LIPITOR) 40 MG Tab 9/25/2019 at pm Patient Yes No   Sig: Take 40 mg by mouth every evening.   cephALEXin (KEFLEX) 500 MG Cap 9/24/2019 at stopped Patient's Home Pharmacy Yes Yes   Sig: Take 500 mg by mouth 3 times a day. 7 day course   escitalopram (LEXAPRO) 20 MG tablet 9/25/2019 at pm Patient Yes No   Sig: Take 20 mg by mouth every evening.      Facility-Administered Medications: None       Physical Exam  Temp:  [36.5 °C (97.7 °F)] 36.5 °C (97.7 °F)  Pulse:  [42-71] 50  Resp:  [16-20] 16  BP: (175-182)/(85-99) 182/92  SpO2:  [95 %-100 %] 100 %    Physical Exam   Constitutional: He is oriented to person, place, and time. He appears well-developed and well-nourished.   HENT:   Head: Normocephalic and atraumatic.   Right Ear: External ear normal.   Left Ear: External ear normal.   Nose: Nose normal.   Mouth/Throat: Oropharynx is clear and moist.   Eyes: Pupils are equal, round, and reactive to light. Conjunctivae and EOM are normal.   Neck: Normal range of motion. Neck supple. No JVD present. No thyromegaly present.   Cardiovascular: Normal rate and regular rhythm.   No murmur heard.  Pulmonary/Chest: He has no wheezes. He has no rales. He exhibits no tenderness.   Abdominal: He exhibits no distension and no mass. There is no tenderness. There is no rebound and no guarding.   Musculoskeletal: Normal range of motion. He exhibits no edema or tenderness.   Lymphadenopathy:     He has no cervical adenopathy.   Neurological: He is alert and oriented to person, place, and time. He has normal reflexes. No cranial nerve deficit.   Skin: Skin is warm and dry. No rash noted. No erythema.        Psychiatric: He has a normal mood and affect.    Nursing note and vitals reviewed.      Laboratory:  Recent Labs     09/24/19  1107 09/25/19  0500 09/26/19  1253   WBC 11.6* 12.7* 11.1*   RBC 4.92 4.52* 4.89   HEMOGLOBIN 16.4 14.4 16.3   HEMATOCRIT 45.8 43.5 46.3   MCV 93.1 96.2 94.7   MCH 33.3* 31.9 33.3*   MCHC 35.8* 33.1* 35.2   RDW 43.8 46.7 46.1   PLATELETCT 181 153* 171   MPV 9.3 9.2 9.4     Recent Labs     09/24/19  1107 09/25/19  0500 09/26/19  1253   SODIUM 137 137 141   POTASSIUM 3.4* 3.8 3.1*   CHLORIDE 105 107 104   CO2 19* 22 22   GLUCOSE 163* 114* 125*   BUN 11 7* 10   CREATININE 1.00 0.90 1.09   CALCIUM 9.3 8.3* 9.7     Recent Labs     09/24/19  1107 09/25/19  0500 09/26/19  1253   ALTSGPT 11  --  20   ASTSGOT 14  --  23   ALKPHOSPHAT 129*  --  126*   TBILIRUBIN 0.8  --  1.0   LIPASE 23  --  24   GLUCOSE 163* 114* 125*         No results for input(s): NTPROBNP in the last 72 hours.      Recent Labs     09/24/19  1107   TROPONINT 10       Urinalysis:    Recent Labs     09/24/19  1232   SPECGRAVITY <=1.005   GLUCOSEUR Negative   KETONES Trace*   NITRITE Negative   LEUKESTERAS Negative        Imaging:  I reviewed the CAT scan of the abdomen pelvis myself.  US-RENAL    (Results Pending)         Assessment/Plan:  I anticipate this patient is appropriate for observation status at this time.    * Hypophosphatemia  Assessment & Plan  Patient states that he eats a normal diet.  He goes out in the sun and exercises regularly.  Less likely that the patient has vitamin D deficiency but we will certainly check a vitamin D level.  Patient's parathyroid will need to be checked this we will at this point check a PTH level.  Patient could have a cancerous process in the background however at this point CT of the abdomen pelvis did not reveal any abnormalities except for cysts in the liver and a attic cyst.  He did have a 4 mm lung nodule but he is not a smoker and that makes this a low risk for any kind of a cancerous process.  The patient certainly could have  other processes that would indicate a cancerous process but at this point that is not evident since the alkaline phosphatase level is slightly elevated we will check a GGT level.  If necessary a bone scan will need to be done.  In the meantime renal excretion of phosphorus may be high along with potassium and dose at this point will run 24-hour urine collection as well as a renal ultrasound.  If necessary we will get nephrology involved.    Hypokalemia  Assessment & Plan  Correct potassium levels, check magnesium level.  Follow potassium level long-term    Elevated alkaline phosphatase level  Assessment & Plan  Due to the elevation of alkaline phosphatase and low low phosphorus and potassium levels I will order a GGT to differentiate liver from the bone.    Nausea & vomiting  Assessment & Plan  This may be an incidental response to his hypophosphatemia I doubt that the vomiting is causing hypophosphatemia he is only had one episode in the past couple days.    Leukocytosis  Assessment & Plan  Leukocytosis is mild at this point may be stress response versus underlying infection.  We will check a urine analysis and also check blood cultures    Hyperglycemia  Assessment & Plan  Blood sugars are elevated repeatedly this I am going to check a hemoglobin A1c level.  We will also check him with Accu-Cheks.        VTE prophylaxis: scd's

## 2019-09-26 NOTE — ASSESSMENT & PLAN NOTE
Patient with low vitamin D level which may cause low phosphorus.  PTH normal -- No hyperparathyroidism that could also cause low phosphorus.  Other causes which may be multifactorial in his findings including recent diarrhea, nausea vomiting, poor intake,, vitamin D deficiency, over exertion heavy exercising with hyperventilation  (patient reports running 3 miles prior to presenting with symptoms.) .   Fu 24 hr urine phosphorus pending   His phosphorus levels have corrected - will continue to monitor.

## 2019-09-26 NOTE — ED NOTES
Med Rec updated and complete per pt at bedside   Allergies have been verified   Pt home pharmacy:[facility]      Pt reports that hs stopped a 7  day course of KEFLEX that was started on 09/19/19

## 2019-09-26 NOTE — ASSESSMENT & PLAN NOTE
Due to the elevation of alkaline phosphatase and low low phosphorus and potassium levels -- will order GGT to eval for biliary source

## 2019-09-27 PROBLEM — I10 HYPERTENSION: Status: ACTIVE | Noted: 2019-09-27

## 2019-09-27 LAB
ALBUMIN SERPL BCP-MCNC: 4.9 G/DL (ref 3.2–4.9)
ALBUMIN/GLOB SERPL: 2 G/DL
ALP SERPL-CCNC: 130 U/L (ref 30–99)
ALT SERPL-CCNC: 16 U/L (ref 2–50)
ANION GAP SERPL CALC-SCNC: 10 MMOL/L (ref 0–11.9)
ANION GAP SERPL CALC-SCNC: 12 MMOL/L (ref 0–11.9)
ANION GAP SERPL CALC-SCNC: 14 MMOL/L (ref 0–11.9)
AST SERPL-CCNC: 19 U/L (ref 12–45)
BILIRUB SERPL-MCNC: 1 MG/DL (ref 0.1–1.5)
BUN SERPL-MCNC: 6 MG/DL (ref 8–22)
BUN SERPL-MCNC: 6 MG/DL (ref 8–22)
BUN SERPL-MCNC: 7 MG/DL (ref 8–22)
CALCIUM SERPL-MCNC: 8.8 MG/DL (ref 8.5–10.5)
CALCIUM SERPL-MCNC: 8.9 MG/DL (ref 8.5–10.5)
CALCIUM SERPL-MCNC: 9.4 MG/DL (ref 8.5–10.5)
CHLORIDE SERPL-SCNC: 101 MMOL/L (ref 96–112)
CHLORIDE SERPL-SCNC: 103 MMOL/L (ref 96–112)
CHLORIDE SERPL-SCNC: 106 MMOL/L (ref 96–112)
CO2 SERPL-SCNC: 23 MMOL/L (ref 20–33)
CO2 SERPL-SCNC: 24 MMOL/L (ref 20–33)
CO2 SERPL-SCNC: 25 MMOL/L (ref 20–33)
CORTIS SERPL-MCNC: 18.5 UG/DL (ref 0–23)
CREAT SERPL-MCNC: 0.79 MG/DL (ref 0.5–1.4)
CREAT SERPL-MCNC: 0.88 MG/DL (ref 0.5–1.4)
CREAT SERPL-MCNC: 0.9 MG/DL (ref 0.5–1.4)
EST. AVERAGE GLUCOSE BLD GHB EST-MCNC: 117 MG/DL
GGT SERPL-CCNC: 31 U/L (ref 7–51)
GLOBULIN SER CALC-MCNC: 2.4 G/DL (ref 1.9–3.5)
GLUCOSE BLD-MCNC: 114 MG/DL (ref 65–99)
GLUCOSE BLD-MCNC: 92 MG/DL (ref 65–99)
GLUCOSE BLD-MCNC: 97 MG/DL (ref 65–99)
GLUCOSE SERPL-MCNC: 112 MG/DL (ref 65–99)
GLUCOSE SERPL-MCNC: 127 MG/DL (ref 65–99)
GLUCOSE SERPL-MCNC: 99 MG/DL (ref 65–99)
HBA1C MFR BLD: 5.7 % (ref 0–5.6)
MAGNESIUM SERPL-MCNC: 1.7 MG/DL (ref 1.5–2.5)
PHOSPHATE SERPL-MCNC: 2.7 MG/DL (ref 2.5–4.5)
PHOSPHATE SERPL-MCNC: 3.9 MG/DL (ref 2.5–4.5)
PHOSPHATE SERPL-MCNC: 7.3 MG/DL (ref 2.5–4.5)
POTASSIUM SERPL-SCNC: 3 MMOL/L (ref 3.6–5.5)
POTASSIUM SERPL-SCNC: 3.1 MMOL/L (ref 3.6–5.5)
POTASSIUM SERPL-SCNC: 3.2 MMOL/L (ref 3.6–5.5)
PROT SERPL-MCNC: 7.3 G/DL (ref 6–8.2)
SODIUM SERPL-SCNC: 138 MMOL/L (ref 135–145)
SODIUM SERPL-SCNC: 139 MMOL/L (ref 135–145)
SODIUM SERPL-SCNC: 141 MMOL/L (ref 135–145)
TSH SERPL DL<=0.005 MIU/L-ACNC: 3.18 UIU/ML (ref 0.38–5.33)

## 2019-09-27 PROCEDURE — 700102 HCHG RX REV CODE 250 W/ 637 OVERRIDE(OP): Performed by: HOSPITALIST

## 2019-09-27 PROCEDURE — 83735 ASSAY OF MAGNESIUM: CPT

## 2019-09-27 PROCEDURE — 84100 ASSAY OF PHOSPHORUS: CPT | Mod: 91

## 2019-09-27 PROCEDURE — A9270 NON-COVERED ITEM OR SERVICE: HCPCS | Performed by: HOSPITALIST

## 2019-09-27 PROCEDURE — 82962 GLUCOSE BLOOD TEST: CPT

## 2019-09-27 PROCEDURE — 700111 HCHG RX REV CODE 636 W/ 250 OVERRIDE (IP): Performed by: HOSPITALIST

## 2019-09-27 PROCEDURE — 82533 TOTAL CORTISOL: CPT

## 2019-09-27 PROCEDURE — 36415 COLL VENOUS BLD VENIPUNCTURE: CPT

## 2019-09-27 PROCEDURE — 99232 SBSQ HOSP IP/OBS MODERATE 35: CPT | Performed by: HOSPITALIST

## 2019-09-27 PROCEDURE — 80048 BASIC METABOLIC PNL TOTAL CA: CPT | Mod: 91

## 2019-09-27 PROCEDURE — 84443 ASSAY THYROID STIM HORMONE: CPT

## 2019-09-27 PROCEDURE — 82530 CORTISOL FREE: CPT

## 2019-09-27 PROCEDURE — 83835 ASSAY OF METANEPHRINES: CPT | Mod: 91

## 2019-09-27 PROCEDURE — 84105 ASSAY OF URINE PHOSPHORUS: CPT

## 2019-09-27 PROCEDURE — 82977 ASSAY OF GGT: CPT

## 2019-09-27 PROCEDURE — 80053 COMPREHEN METABOLIC PANEL: CPT

## 2019-09-27 PROCEDURE — 96372 THER/PROPH/DIAG INJ SC/IM: CPT

## 2019-09-27 PROCEDURE — 770020 HCHG ROOM/CARE - TELE (206)

## 2019-09-27 PROCEDURE — 700101 HCHG RX REV CODE 250: Performed by: HOSPITALIST

## 2019-09-27 PROCEDURE — 700105 HCHG RX REV CODE 258

## 2019-09-27 RX ORDER — AMLODIPINE BESYLATE 5 MG/1
5 TABLET ORAL
Status: DISCONTINUED | OUTPATIENT
Start: 2019-09-27 | End: 2019-09-28

## 2019-09-27 RX ORDER — POTASSIUM CHLORIDE 20 MEQ/1
40 TABLET, EXTENDED RELEASE ORAL 2 TIMES DAILY
Status: DISCONTINUED | OUTPATIENT
Start: 2019-09-27 | End: 2019-09-30 | Stop reason: HOSPADM

## 2019-09-27 RX ORDER — ERGOCALCIFEROL 1.25 MG/1
50000 CAPSULE ORAL
Status: DISCONTINUED | OUTPATIENT
Start: 2019-09-27 | End: 2019-09-30 | Stop reason: HOSPADM

## 2019-09-27 RX ORDER — MAGNESIUM SULFATE HEPTAHYDRATE 40 MG/ML
2 INJECTION, SOLUTION INTRAVENOUS ONCE
Status: COMPLETED | OUTPATIENT
Start: 2019-09-27 | End: 2019-09-27

## 2019-09-27 RX ORDER — CALCIUM CARBONATE 500 MG/1
500 TABLET, CHEWABLE ORAL ONCE
Status: COMPLETED | OUTPATIENT
Start: 2019-09-27 | End: 2019-09-27

## 2019-09-27 RX ORDER — SODIUM CHLORIDE 9 MG/ML
INJECTION, SOLUTION INTRAVENOUS
Status: COMPLETED
Start: 2019-09-27 | End: 2019-09-27

## 2019-09-27 RX ORDER — AMLODIPINE BESYLATE 5 MG/1
5 TABLET ORAL ONCE
Status: COMPLETED | OUTPATIENT
Start: 2019-09-27 | End: 2019-09-27

## 2019-09-27 RX ADMIN — QUETIAPINE FUMARATE 25 MG: 25 TABLET ORAL at 21:11

## 2019-09-27 RX ADMIN — LABETALOL HYDROCHLORIDE 10 MG: 5 INJECTION INTRAVENOUS at 18:45

## 2019-09-27 RX ADMIN — ERGOCALCIFEROL 50000 UNITS: 1.25 CAPSULE ORAL at 17:53

## 2019-09-27 RX ADMIN — ACETAMINOPHEN 650 MG: 325 TABLET, FILM COATED ORAL at 03:37

## 2019-09-27 RX ADMIN — ATORVASTATIN CALCIUM 40 MG: 40 TABLET, FILM COATED ORAL at 17:53

## 2019-09-27 RX ADMIN — SODIUM CHLORIDE 500 ML: 9 INJECTION, SOLUTION INTRAVENOUS at 15:35

## 2019-09-27 RX ADMIN — AMLODIPINE BESYLATE 5 MG: 5 TABLET ORAL at 08:44

## 2019-09-27 RX ADMIN — ACETAMINOPHEN 650 MG: 325 TABLET, FILM COATED ORAL at 18:44

## 2019-09-27 RX ADMIN — MAGNESIUM GLUCONATE 500 MG ORAL TABLET 400 MG: 500 TABLET ORAL at 17:53

## 2019-09-27 RX ADMIN — POTASSIUM CHLORIDE 40 MEQ: 1500 TABLET, EXTENDED RELEASE ORAL at 17:52

## 2019-09-27 RX ADMIN — POTASSIUM CHLORIDE 40 MEQ: 1500 TABLET, EXTENDED RELEASE ORAL at 08:43

## 2019-09-27 RX ADMIN — ESCITALOPRAM OXALATE 20 MG: 10 TABLET ORAL at 17:53

## 2019-09-27 RX ADMIN — NITROGLYCERIN 0.5 INCH: 20 OINTMENT TOPICAL at 04:38

## 2019-09-27 RX ADMIN — ENOXAPARIN SODIUM 40 MG: 100 INJECTION SUBCUTANEOUS at 04:37

## 2019-09-27 RX ADMIN — MAGNESIUM SULFATE IN WATER 2 G: 40 INJECTION, SOLUTION INTRAVENOUS at 15:30

## 2019-09-27 RX ADMIN — LABETALOL HYDROCHLORIDE 10 MG: 5 INJECTION INTRAVENOUS at 20:11

## 2019-09-27 RX ADMIN — POTASSIUM CHLORIDE AND SODIUM CHLORIDE: 900; 150 INJECTION, SOLUTION INTRAVENOUS at 13:09

## 2019-09-27 RX ADMIN — ANTACID TABLETS 500 MG: 500 TABLET, CHEWABLE ORAL at 21:11

## 2019-09-27 RX ADMIN — NITROGLYCERIN 0.5 INCH: 20 OINTMENT TOPICAL at 13:12

## 2019-09-27 RX ADMIN — AMLODIPINE BESYLATE 5 MG: 5 TABLET ORAL at 21:30

## 2019-09-27 ASSESSMENT — ENCOUNTER SYMPTOMS
FLANK PAIN: 0
NECK PAIN: 0
WEAKNESS: 0
DIARRHEA: 0
FOCAL WEAKNESS: 0
PALPITATIONS: 0
DIAPHORESIS: 0
VOMITING: 0
FEVER: 0
STRIDOR: 0
COUGH: 0
ABDOMINAL PAIN: 0
HALLUCINATIONS: 0
EYE DISCHARGE: 0
SHORTNESS OF BREATH: 0
WHEEZING: 0
SPEECH CHANGE: 0
BACK PAIN: 0
CHILLS: 0
SENSORY CHANGE: 0
EYE REDNESS: 0
TREMORS: 0
NAUSEA: 1

## 2019-09-27 ASSESSMENT — LIFESTYLE VARIABLES: SUBSTANCE_ABUSE: 0

## 2019-09-27 NOTE — CARE PLAN
Problem: Communication  Goal: The ability to communicate needs accurately and effectively will improve  Outcome: PROGRESSING AS EXPECTED   Patient call light within reach, patient is able to communicate needs  Problem: Safety  Goal: Will remain free from injury  Outcome: PROGRESSING AS EXPECTED   Patient room free of clutter, bed locked and in lowest position, patient remains free from falls   Problem: Knowledge Deficit  Goal: Knowledge of disease process/condition, treatment plan, diagnostic tests, and medications will improve  Outcome: PROGRESSING AS EXPECTED   Patient verbalizes understanding

## 2019-09-27 NOTE — ASSESSMENT & PLAN NOTE
No reported history of.  Initial SBP 190s systolic.    Still not well controlled,on amlodipine, also on IV prn labetalol and vasotec, also adding lisinopril   24-hour urine cortisol wnl   Advised patient outpatient follow-up with newly established PCP.

## 2019-09-27 NOTE — PROGRESS NOTES
Received report from Natacha ARMENDARIZ, at pt bedside. Pt has no complaints at this time, POC discussed. Call light and belongings within reach. Bed locked and in low position. Alarm on and fall precautions in place.

## 2019-09-27 NOTE — PROGRESS NOTES
Pt w/ consistently elevated bp of 203/104 nonresponsive to IV hydralazine and complaining of severe nausea. Dr Vann updated. Another dose of 10 mg IV hydralazine ordered and q30 min bp monitoring for the next hr and a half. RN to update MD w/ any changes or no improvement in bp.

## 2019-09-27 NOTE — PROGRESS NOTES
2 RN Skin Check    2 RN skin check complete.   Devices in place: none.  Skin assessed under devices: N\A.  Confirmed pressure ulcers found on: n/a.  New potential pressure ulcers noted on n/a. Wound consult placed N/A.  The following interventions in place Pillows and Lotion.    Laceration to top of skull w/ sutures in place. CDI.  Sacrum slightly red but blanching.   Otherwise skin intact.

## 2019-09-27 NOTE — PROGRESS NOTES
Received report from day shift RNAsia. Assumed care of pt. Pt reports no pain at this time. Updated pt on plan of care. Pt resting comfortably in bed. Pt up self. Educated on use of call light. Hourly rounding and continuous monitoring in place.      Pt down to CT w/ transport in wheelchair. Monitor room notified.

## 2019-09-27 NOTE — PROGRESS NOTES
Pt bp back up to 177/78. Dr Vann notified.     0049:  Nitro paste ordered to help w/ bp control however pt spontaneously self corrected bp back down to 133/62. Orders to d/c nitro per Dr Vann. If sbp goes back up to 160-170, then RN to reorder nitro paste. Will continue to monitor.

## 2019-09-27 NOTE — CARE PLAN
Problem: Communication  Goal: The ability to communicate needs accurately and effectively will improve  Outcome: PROGRESSING AS EXPECTED   Pt whiteboard updated on plan of care  Pt encouraged to call for assistance  Pt encouraged to voice any concerns or questions regarding care plan  Pt updated on plan of care as it develops and changes     Problem: Safety  Goal: Will remain free from injury  Outcome: PROGRESSING AS EXPECTED   Treaded socks in use  Call light within reach and patient calls appropriately  Medication education provided prior to administration  Medications administered as ordered  Bed locked in lowest position

## 2019-09-27 NOTE — PROGRESS NOTES
Pt phos level up to 7.3 this morning. Dr Vann notified, d/c all IV supplements at this time. No new orders.     0414: Pt sbp back up to 169. Dr Vann notified, nitro paste ordered to help w/ bp control.

## 2019-09-28 ENCOUNTER — APPOINTMENT (OUTPATIENT)
Dept: RADIOLOGY | Facility: MEDICAL CENTER | Age: 64
DRG: 642 | End: 2019-09-28
Attending: PSYCHIATRY & NEUROLOGY
Payer: MEDICARE

## 2019-09-28 PROBLEM — R56.9 SEIZURE (HCC): Status: ACTIVE | Noted: 2019-09-28

## 2019-09-28 LAB
ANION GAP SERPL CALC-SCNC: 11 MMOL/L (ref 0–11.9)
BUN SERPL-MCNC: 6 MG/DL (ref 8–22)
CALCIUM SERPL-MCNC: 9.1 MG/DL (ref 8.5–10.5)
CHLORIDE SERPL-SCNC: 106 MMOL/L (ref 96–112)
CO2 SERPL-SCNC: 22 MMOL/L (ref 20–33)
CREAT SERPL-MCNC: 0.83 MG/DL (ref 0.5–1.4)
GLUCOSE BLD-MCNC: 123 MG/DL (ref 65–99)
GLUCOSE BLD-MCNC: 128 MG/DL (ref 65–99)
GLUCOSE BLD-MCNC: 138 MG/DL (ref 65–99)
GLUCOSE BLD-MCNC: 144 MG/DL (ref 65–99)
GLUCOSE SERPL-MCNC: 120 MG/DL (ref 65–99)
MAGNESIUM SERPL-MCNC: 2.2 MG/DL (ref 1.5–2.5)
PHOSPHATE SERPL-MCNC: 2.6 MG/DL (ref 2.5–4.5)
POTASSIUM SERPL-SCNC: 3.5 MMOL/L (ref 3.6–5.5)
SODIUM SERPL-SCNC: 139 MMOL/L (ref 135–145)

## 2019-09-28 PROCEDURE — 36415 COLL VENOUS BLD VENIPUNCTURE: CPT

## 2019-09-28 PROCEDURE — 83735 ASSAY OF MAGNESIUM: CPT

## 2019-09-28 PROCEDURE — 84100 ASSAY OF PHOSPHORUS: CPT

## 2019-09-28 PROCEDURE — A9270 NON-COVERED ITEM OR SERVICE: HCPCS | Performed by: HOSPITALIST

## 2019-09-28 PROCEDURE — 700102 HCHG RX REV CODE 250 W/ 637 OVERRIDE(OP): Performed by: HOSPITALIST

## 2019-09-28 PROCEDURE — 700105 HCHG RX REV CODE 258: Performed by: PSYCHIATRY & NEUROLOGY

## 2019-09-28 PROCEDURE — 700111 HCHG RX REV CODE 636 W/ 250 OVERRIDE (IP): Performed by: HOSPITALIST

## 2019-09-28 PROCEDURE — A9270 NON-COVERED ITEM OR SERVICE: HCPCS | Performed by: INTERNAL MEDICINE

## 2019-09-28 PROCEDURE — 700111 HCHG RX REV CODE 636 W/ 250 OVERRIDE (IP): Performed by: INTERNAL MEDICINE

## 2019-09-28 PROCEDURE — 80048 BASIC METABOLIC PNL TOTAL CA: CPT

## 2019-09-28 PROCEDURE — 700111 HCHG RX REV CODE 636 W/ 250 OVERRIDE (IP): Performed by: PSYCHIATRY & NEUROLOGY

## 2019-09-28 PROCEDURE — 70551 MRI BRAIN STEM W/O DYE: CPT

## 2019-09-28 PROCEDURE — 770020 HCHG ROOM/CARE - TELE (206)

## 2019-09-28 PROCEDURE — 99221 1ST HOSP IP/OBS SF/LOW 40: CPT | Performed by: PSYCHIATRY & NEUROLOGY

## 2019-09-28 PROCEDURE — 99232 SBSQ HOSP IP/OBS MODERATE 35: CPT | Performed by: INTERNAL MEDICINE

## 2019-09-28 PROCEDURE — A9270 NON-COVERED ITEM OR SERVICE: HCPCS | Performed by: PSYCHIATRY & NEUROLOGY

## 2019-09-28 PROCEDURE — 82962 GLUCOSE BLOOD TEST: CPT

## 2019-09-28 PROCEDURE — 700102 HCHG RX REV CODE 250 W/ 637 OVERRIDE(OP): Performed by: INTERNAL MEDICINE

## 2019-09-28 PROCEDURE — 700102 HCHG RX REV CODE 250 W/ 637 OVERRIDE(OP): Performed by: PSYCHIATRY & NEUROLOGY

## 2019-09-28 RX ORDER — LORAZEPAM 2 MG/ML
INJECTION INTRAMUSCULAR
Status: ACTIVE
Start: 2019-09-28 | End: 2019-09-28

## 2019-09-28 RX ORDER — SODIUM CHLORIDE 9 MG/ML
INJECTION, SOLUTION INTRAVENOUS
Status: ACTIVE
Start: 2019-09-28 | End: 2019-09-28

## 2019-09-28 RX ORDER — CALCIUM CARBONATE 500 MG/1
500 TABLET, CHEWABLE ORAL 2 TIMES DAILY PRN
Status: DISCONTINUED | OUTPATIENT
Start: 2019-09-28 | End: 2019-09-30 | Stop reason: HOSPADM

## 2019-09-28 RX ORDER — LORAZEPAM 2 MG/ML
2 INJECTION INTRAMUSCULAR EVERY 4 HOURS PRN
Status: DISCONTINUED | OUTPATIENT
Start: 2019-09-28 | End: 2019-09-30 | Stop reason: HOSPADM

## 2019-09-28 RX ORDER — AMLODIPINE BESYLATE 10 MG/1
10 TABLET ORAL
Status: DISCONTINUED | OUTPATIENT
Start: 2019-09-29 | End: 2019-09-30 | Stop reason: HOSPADM

## 2019-09-28 RX ORDER — AMLODIPINE BESYLATE 5 MG/1
5 TABLET ORAL ONCE
Status: COMPLETED | OUTPATIENT
Start: 2019-09-28 | End: 2019-09-28

## 2019-09-28 RX ORDER — LORAZEPAM 2 MG/ML
2 INJECTION INTRAMUSCULAR
Status: DISCONTINUED | OUTPATIENT
Start: 2019-09-28 | End: 2019-09-28

## 2019-09-28 RX ORDER — LEVETIRACETAM 500 MG/1
1000 TABLET ORAL 2 TIMES DAILY
Status: DISCONTINUED | OUTPATIENT
Start: 2019-09-28 | End: 2019-09-30 | Stop reason: HOSPADM

## 2019-09-28 RX ORDER — LEVETIRACETAM 100 MG/ML
1000 SOLUTION ORAL EVERY 12 HOURS
Status: DISCONTINUED | OUTPATIENT
Start: 2019-09-28 | End: 2019-09-28

## 2019-09-28 RX ORDER — ENALAPRILAT 1.25 MG/ML
1.25 INJECTION INTRAVENOUS EVERY 6 HOURS PRN
Status: DISCONTINUED | OUTPATIENT
Start: 2019-09-28 | End: 2019-09-30 | Stop reason: HOSPADM

## 2019-09-28 RX ADMIN — MAGNESIUM GLUCONATE 500 MG ORAL TABLET 400 MG: 500 TABLET ORAL at 05:09

## 2019-09-28 RX ADMIN — AMLODIPINE BESYLATE 5 MG: 5 TABLET ORAL at 05:09

## 2019-09-28 RX ADMIN — ACETAMINOPHEN 650 MG: 325 TABLET, FILM COATED ORAL at 15:59

## 2019-09-28 RX ADMIN — ENALAPRILAT 1.25 MG: 1.25 INJECTION INTRAVENOUS at 09:12

## 2019-09-28 RX ADMIN — PROCHLORPERAZINE EDISYLATE 10 MG: 5 INJECTION INTRAMUSCULAR; INTRAVENOUS at 08:17

## 2019-09-28 RX ADMIN — ESCITALOPRAM OXALATE 20 MG: 10 TABLET ORAL at 19:39

## 2019-09-28 RX ADMIN — SODIUM CHLORIDE 2000 MG: 9 INJECTION, SOLUTION INTRAVENOUS at 08:28

## 2019-09-28 RX ADMIN — ENOXAPARIN SODIUM 40 MG: 100 INJECTION SUBCUTANEOUS at 05:09

## 2019-09-28 RX ADMIN — QUETIAPINE FUMARATE 25 MG: 25 TABLET ORAL at 19:39

## 2019-09-28 RX ADMIN — PROCHLORPERAZINE EDISYLATE 10 MG: 5 INJECTION INTRAMUSCULAR; INTRAVENOUS at 00:12

## 2019-09-28 RX ADMIN — LEVETIRACETAM 1000 MG: 500 TABLET ORAL at 17:30

## 2019-09-28 RX ADMIN — POTASSIUM CHLORIDE 40 MEQ: 1500 TABLET, EXTENDED RELEASE ORAL at 05:09

## 2019-09-28 RX ADMIN — ANTACID TABLETS 500 MG: 500 TABLET, CHEWABLE ORAL at 18:11

## 2019-09-28 RX ADMIN — AMLODIPINE BESYLATE 5 MG: 5 TABLET ORAL at 09:20

## 2019-09-28 RX ADMIN — POTASSIUM CHLORIDE 40 MEQ: 1500 TABLET, EXTENDED RELEASE ORAL at 17:30

## 2019-09-28 RX ADMIN — ATORVASTATIN CALCIUM 40 MG: 40 TABLET, FILM COATED ORAL at 19:39

## 2019-09-28 RX ADMIN — LABETALOL HYDROCHLORIDE 10 MG: 5 INJECTION INTRAVENOUS at 12:13

## 2019-09-28 RX ADMIN — SENNOSIDES, DOCUSATE SODIUM 2 TABLET: 50; 8.6 TABLET, FILM COATED ORAL at 05:09

## 2019-09-28 ASSESSMENT — ENCOUNTER SYMPTOMS
WEAKNESS: 0
VOMITING: 0
SPEECH CHANGE: 0
DIARRHEA: 0
TREMORS: 0
PALPITATIONS: 0
STRIDOR: 0
EYE DISCHARGE: 0
EYE PAIN: 0
COUGH: 0
DIAPHORESIS: 0
FOCAL WEAKNESS: 0
WHEEZING: 0
HALLUCINATIONS: 0
NECK PAIN: 0
SEIZURES: 1
FLANK PAIN: 0
EYE REDNESS: 0
ABDOMINAL PAIN: 0
FEVER: 0
SHORTNESS OF BREATH: 0
SENSORY CHANGE: 0
CHILLS: 0
NAUSEA: 1
BACK PAIN: 0

## 2019-09-28 ASSESSMENT — LIFESTYLE VARIABLES: SUBSTANCE_ABUSE: 0

## 2019-09-28 NOTE — CARE PLAN
Problem: Communication  Goal: The ability to communicate needs accurately and effectively will improve  9/28/2019 0335 by Nelida Grey R.N.  Outcome: PROGRESSING AS EXPECTED  9/27/2019 2235 by Nelida Grey R.N.  Outcome: PROGRESSING AS EXPECTED  Intervention: [place] patient and significant other/support system to call light to alert staff of needs  Flowsheets (Taken 9/28/2019 0335)  Oriented to:: All of the Following : Location of Bathroom, Visiting Policy, Unit Routine, Call Light and Bedside Controls, Bedside Rail Policy, Smoking Policy, Rights and Responsibilities, Bedside Report, and Patient Education Notebook     Problem: Infection  Goal: Will remain free from infection  9/28/2019 0335 by Nelida Grey R.N.  Outcome: PROGRESSING AS EXPECTED  9/27/2019 2235 by Nelida Grey R.N.  Outcome: PROGRESSING AS EXPECTED  Intervention: Implement standard precautions and perform hand washing before and after patient contact  Note:   Standard handwashing precautions and hand washing implemented before and after pt contact.

## 2019-09-28 NOTE — PROGRESS NOTES
Assumed care of pt, bedside report received from Florence Lowery RN. Call light within reach. Addressed POC with pt, no additional questions at this time.

## 2019-09-28 NOTE — ASSESSMENT & PLAN NOTE
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  Neurology following , after a loading dose with keppra now on 1000 mg BID, MRI neg for acute issues  EEG pending   Appreciate neurology rec.

## 2019-09-28 NOTE — PROGRESS NOTES
Rapid response called for witnessed seizure by nursing staff and wife at bedside. Stat page to Dr Velázquez. See rapid flow sheet.

## 2019-09-28 NOTE — CARE PLAN
Problem: Safety  Goal: Will remain free from injury  Outcome: PROGRESSING AS EXPECTED  Note:   Patient free from falls and injuries at this time. Bed low locked position. Call light in reach. Patient is A&Ox4, uses call light appropriately to call for assistance. OOB independently. Treaded footwear applied. Personal belongings in reach.        Problem: Knowledge Deficit  Goal: Knowledge of disease process/condition, treatment plan, diagnostic tests, and medications will improve  Outcome: PROGRESSING AS EXPECTED  Note:   Patient updated on plan of care. All medications discussed with patient prior to administration. Patient verbalizes understanding, no questions at this time.

## 2019-09-28 NOTE — PROGRESS NOTES
Hospital Medicine Daily Progress Note    Date of Service  9/28/2019    Chief Complaint  63 y.o. male admitted 9/26/2019 with weakness with nausea/vomiting.    Hospital Course    63-year-old male history of recurrent episodes of weakness with nausea, vomiting,  recently discharged home on 9/25 with findings of acute gastroenteritis with low phosphorus, potassium readmitted with findings of severe low phosphorus.  In addition patient's magnesium and potassium levels were low.  Patient admitted for electrolyte replacement and further work-up.    Interval Problem Update  Pt seen and examined, rapid response called this AM as pt was having seizure, there was concern also for stroke so rapid team has called a code stroke, but his symptoms improved and most likely related to seizure activity.  Neurology also following appreciate rec.    Regarding his hypophosphatemia his phosphorus has improved      Consultants/Specialty  None     Code Status  Full code    Disposition  Td     Review of Systems  Review of Systems   Constitutional: Positive for malaise/fatigue (Resolved). Negative for chills, diaphoresis and fever.   HENT: Negative for congestion.    Eyes: Negative for pain, discharge and redness.   Respiratory: Negative for cough, shortness of breath, wheezing and stridor.    Cardiovascular: Negative for chest pain, palpitations and leg swelling.   Gastrointestinal: Positive for nausea (Resolved). Negative for abdominal pain, diarrhea and vomiting.   Genitourinary: Negative for flank pain and hematuria.   Musculoskeletal: Negative for back pain, joint pain and neck pain.   Neurological: Positive for seizures. Negative for tremors, sensory change, speech change, focal weakness and weakness.   Psychiatric/Behavioral: Negative for hallucinations and substance abuse.        Physical Exam  Temp:  [36.6 °C (97.8 °F)-37.5 °C (99.5 °F)] 37.1 °C (98.7 °F)  Pulse:  [54-89] 73  Resp:  [14-18] 15  BP: (107-220)/() 134/92  SpO2:   [94 %-99 %] 98 %    Physical Exam   Constitutional: He is oriented to person, place, and time. No distress.   HENT:   Head: Normocephalic and atraumatic.   Nose: Nose normal.   Eyes: Conjunctivae and EOM are normal. No scleral icterus.   Neck: Normal range of motion. Neck supple. No JVD present.   Cardiovascular: Normal rate and regular rhythm.   No murmur heard.  Pulmonary/Chest: Effort normal. No respiratory distress. He has no rales.   Abdominal: Soft. Bowel sounds are normal. He exhibits no distension. There is no tenderness.   Musculoskeletal: He exhibits no edema or tenderness.   Neurological: He is alert and oriented to person, place, and time. No cranial nerve deficit.   No focal weakness   Skin: Skin is warm and dry. He is not diaphoretic. No pallor.   Psychiatric: He has a normal mood and affect. His behavior is normal.   Nursing note and vitals reviewed.      Fluids  No intake or output data in the 24 hours ending 09/28/19 1621    Laboratory  Recent Labs     09/26/19  1253   WBC 11.1*   RBC 4.89   HEMOGLOBIN 16.3   HEMATOCRIT 46.3   MCV 94.7   MCH 33.3*   MCHC 35.2   RDW 46.1   PLATELETCT 171   MPV 9.4     Recent Labs     09/27/19  0912 09/27/19  1525 09/28/19  0216   SODIUM 138 141 139   POTASSIUM 3.0* 3.2* 3.5*   CHLORIDE 103 106 106   CO2 25 23 22   GLUCOSE 99 112* 120*   BUN 6* 6* 6*   CREATININE 0.88 0.79 0.83   CALCIUM 8.9 8.8 9.1                   Imaging  MR-BRAIN-W/O   Final Result      1.  Postsurgical changes in the left temporal lobe.   2.  Mild cerebral atrophy.   3.  No acute abnormality.   4.  Mild chronic microvascular ischemic disease.      CT-CHEST (THORAX) WITH   Final Result      Solitary 4 mm uncalcified left lower lobe nodule. See below recommendations.      Low Risk: No routine follow-up      High Risk: Optional CT at 12 months      Comments: Nodules less than 6 mm do not require routine follow-up, but certain patients at high risk with suspicious nodule morphology, upper lobe  location, or both may warrant 12-month follow-up.      Low Risk - Minimal or absent history of smoking and of other known risk factors.      High Risk - History of smoking or of other known risk factors.      Note: These recommendations do not apply to lung cancer screening, patients with immunosuppression, or patients with known primary cancer.      Fleischner Society 2017 Guidelines for Management of Incidentally Detected Pulmonary Nodules in Adults      US-RENAL   Final Result      2 cm right renal cyst. Otherwise unremarkable renal ultrasound.           Assessment/Plan  * Hypophosphatemia  Assessment & Plan  Patient with low vitamin D level which may cause low phosphorus.  PTH normal -- No hyperparathyroidism that could also cause low phosphorus.  Other causes which may be multifactorial in his findings including recent diarrhea, nausea vomiting, poor intake,, vitamin D deficiency, over exertion heavy exercising with hyperventilation  (patient reports running 3 miles prior to presenting with symptoms.) .   Fu 24 hr urine phosphorus pending   His phosphorus levels have corrected - will continue to monitor.       Nausea & vomiting  Assessment & Plan  Of unclear etiology.  Recent diagnosis of gastroenteritis.  Abdominal exam benign.  LFTs and lipase normal-unlikely pancreas biliary source.  Patient was very hypertensive initially- ? Associated symptomatic hypertension , electrolyte depletion symptoms.  Symptoms now resolved.-Hep-Lock IV fluids.  PRN antiemetics.  Replete electrolytes.  Start treatment for hypertension.  Further work-up if recurrent symptoms.    Hypokalemia  Assessment & Plan  Likely assoc with recent N/V , nutritional .    Corrected low Mag with IV magnesium   Slowly improving        Elevated alkaline phosphatase level  Assessment & Plan  Due to the elevation of alkaline phosphatase and low low phosphorus and potassium levels -- will order GGT to eval for biliary source     Incidental lung nodule-  (present on admission)  Assessment & Plan  Outpatient follow up.     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  Neurology also on the case, and pt has been loaded with keppra 2 gm and then will be continued on 1000 mg BID.   MRI brain was done and did not show any acute issues.   Appreciate neurology rec.     Hypertension  Assessment & Plan  No reported history of.  Initial SBP 190s systolic.    Still not well controlled, increasing amlodipine, also on IV prn labetalol and vasotec montior   24-hour urine cortisol wnl   Advised patient outpatient follow-up with newly established PCP.     Leukocytosis  Assessment & Plan  Leukocytosis is mild at this point may be stress response versus underlying infection.  Patient without fevers .  Patient recent diagnosis of gastroenteritis--symptoms resolving--  will monitor for recurrent/acute symptoms .    Patient UA negative.  Blood cultures negative to date.-Follow-up        Hyperglycemia  Assessment & Plan  Fu HgbA1c 5.7   Monitor          VTE prophylaxis: Lovenox.

## 2019-09-28 NOTE — CONSULTS
"Neurology STROKE CODE H&P  Neurohospitalist Service, [facility]    Referring Physician: Reva Velázquez M.D.    STROKE CODE:   Chief Complaint   Patient presents with   • Abdominal Pain   • Shaking       To obtain the most accurate data regarding the time called, and time patient seen, refer to the stroke run-sheet and chart.  For time of CT, refer to the radiology report. See A&P below for TPA Decision and door to needle time if and when applicable.    HPI: The following hospital course was obstructed from the chart per Dr. Ravindra Hsieh's note 9/27/19: \"63-year-old male history of recurrent episodes of weakness with nausea, vomiting,  recently discharged home on 9/25 with findings of acute gastroenteritis with low phosphorus, potassium readmitted with findings of severe low phosphorus.  In addition patient's magnesium and potassium levels were low.  Patient admitted for electrolyte replacement and further work-up.\"    The history that I gathered at bedside after the code stroke was called is as follows.  The patient was last seen normal by nursing at 5:15 AM 9/28/2019 when he was given his meds, conversive, moving symmetrically.  The stroke alert was called at 7:19 AM after a witnessed seizure by the patient's wife.  Seizure described as tonic-clonic jerking of bilateral arms with head and eye version lasting about a minute.  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  The patient has been off seizure medication and seizure-free since the surgery.  The code was called for post ictal symptoms of aphasia, right facial droop, and right arm weakness.  Upon my arrival to the bedside, the symptoms were already beginning to improve.  There was no loss of continence, no tongue biting, but the patient was mildly confused and fatigued, closing his eyes, but easily redirectable.  Denies any pain, numbness, " diplopia, or headache.    Review of systems: In addition to what is detailed in the HPI above, (and scanned into the chart if and when applicable), all other systems reviewed and are negative.    Past Medical History:    has no past medical history on file.    FHx:  family history is not on file.    SHx:   reports that he has quit smoking. His smoking use included cigarettes. He has never used smokeless tobacco. He reports that he drank alcohol. He reports that he has current or past drug history. Drugs: Marijuana and Inhaled.    Allergies:  No Known Allergies    Medications:    Current Facility-Administered Medications:   •  LORAZEPAM 2 MG/ML INJ SOLN, , , ,   •  levETIRAcetam (KEPPRA) 2,000 mg in  mL IVPB, 2,000 mg, Intravenous, Once, Davian Mcgraw M.D.  •  levETIRAcetam (KEPPRA) 1,000 mg in  mL IVPB, 1,000 mg, Intravenous, Q12HRS, Davian Mcgraw M.D.  •  LORazepam (ATIVAN) injection 2 mg, 2 mg, Intravenous, Q2HRS PRN, Davian Mcgraw M.D.  •  potassium chloride SA (Kdur) tablet 40 mEq, 40 mEq, Oral, BID, Ravindra Hsieh M.D., 40 mEq at 09/28/19 0509  •  amLODIPine (NORVASC) tablet 5 mg, 5 mg, Oral, Q DAY, Ravindra Hsieh M.D., 5 mg at 09/28/19 0509  •  vitamin D (Ergocalciferol) (DRISDOL) capsule 50,000 Units, 50,000 Units, Oral, Q7 DAYS, Ravindra Hsieh M.D., 50,000 Units at 09/27/19 1753  •  magnesium oxide (MAG-OX) tablet 400 mg, 400 mg, Oral, DAILY, Ravindra Hsieh M.D., 400 mg at 09/28/19 0509  •  atorvastatin (LIPITOR) tablet 40 mg, 40 mg, Oral, Q EVENING, Kenroy Dukes M.D., 40 mg at 09/27/19 1753  •  escitalopram (LEXAPRO) tablet 20 mg, 20 mg, Oral, Q EVENING, Kenroy Dukes M.D., 20 mg at 09/27/19 1753  •  QUEtiapine (SEROQUEL) tablet 25 mg, 25 mg, Oral, Q EVENING, Kenroy Dukes M.D., Stopped at 09/27/19 2200  •  senna-docusate (PERICOLACE or SENOKOT S) 8.6-50 MG per tablet 2 Tab, 2 Tab, Oral, BID, 2 Tab at 09/28/19 0509 **AND** polyethylene glycol/lytes (MIRALAX) PACKET 1 Packet, 1 Packet,  Oral, QDAY PRN **AND** magnesium hydroxide (MILK OF MAGNESIA) suspension 30 mL, 30 mL, Oral, QDAY PRN **AND** bisacodyl (DULCOLAX) suppository 10 mg, 10 mg, Rectal, QDAY PRN, Kenroy Dukes M.D.  •  enoxaparin (LOVENOX) inj 40 mg, 40 mg, Subcutaneous, DAILY, Kenroy Dukes M.D., 40 mg at 09/28/19 0509  •  acetaminophen (TYLENOL) tablet 650 mg, 650 mg, Oral, Q6HRS PRN, Kenroy Dkues M.D., 650 mg at 09/27/19 1844  •  labetalol (NORMODYNE,TRANDATE) injection 10 mg, 10 mg, Intravenous, Q4HRS PRN, Kenroy Dukes M.D., 10 mg at 09/27/19 2011  •  ondansetron (ZOFRAN) syringe/vial injection 4 mg, 4 mg, Intravenous, Q4HRS PRN, Kenroy Dukes M.D., 4 mg at 09/26/19 1830  •  ondansetron (ZOFRAN ODT) dispertab 4 mg, 4 mg, Oral, Q4HRS PRN, Kenroy Dukes M.D.  •  prochlorperazine (COMPAZINE) injection 10 mg, 10 mg, Intravenous, Q6HRS PRN, Adalgisa Pendleton M.D., 10 mg at 09/28/19 0012    Physical Examination:    Vitals:    09/28/19 0706 09/28/19 0708 09/28/19 0710 09/28/19 0728   BP: (!) 220/107 (!) 171/101 (!) 189/106 (!) 169/96   Pulse: 89 89 89 87   Resp: 16  16 18   Temp:   37.3 °C (99.1 °F)    TempSrc:       SpO2:    96%   Weight:           General: Patient is awake and in no acute distress  Neck: There is normal range of motion  CV: RRR  Psychiatric: Mood and affect appear normal    NEUROLOGICAL EXAM:     Mental status: Awake, alert and fully oriented, follows commands  Speech and language: speech is clear and fluent. The patient is able to name and repeat.  Cranial nerve exam: Pupils are equal, round and reactive to light bilaterally. Visual fields are full. There is no nystagmus. Extraocular muscles are intact. Face is evident for resolving right facial droop. Sensation in the face is intact to light touch. Uvula is midline. Palate elevates symmetrically. Tongue is midline. Sternocleidomastoid muscles exhibit normal strength bilaterally.  Motor exam: Pronator drift in the right arm, which is an improvement from  about 15 minutes ago per RN reports of right-sided paresis versus plegia.. Tone is normal. No abnormal movements were seen on exam.  Sensory exam No sensory deficits identified   Deep tendon reflexes:  2+ and symmetric. Toes down-going bilaterally.  Coordination: no ataxia   Gait: deferred    NIH Stroke Scale:    1a. Level of Consciousness (Alert, drowsy, etc): 0= Alert    1b. LOC Questions (Month, age): 0= Answers both correctly    1c. LOC Commands (Open/close eyes make fist/let go): 0= Obeys both correctly    2.   Best Gaze (Eyes open - patient follows examiner's finger on face): 0= Normal    3.   Visual Fields (introduce visual stimulus/threat to patient's field quadrants): 0= No visual loss  4.   Facial Paresis (Show teeth, raise eyebrows and squeeze eyes shut): 1= Minor     5a. Motor Arm - Left (Elevate arm to 90 degrees if patient is sitting, 45 degrees if  supine): 0= No drift    5b. Motor Arm - Right (Elevate arm to 90 degrees if patient is sitting, 45 degrees if supine): 1= Drift    6a. Motor Leg - Left (Elevate leg 30 degrees with patient supine): 0= No drift    6b. Motor Leg - Right  (Elevate leg 30 degrees with patient supine): 0= No drift    7.   Limb Ataxia (Finger-nose, heel down shin): 0= No ataxia    8.   Sensory (Pin prick to face, arm, trunk and leg - compare side to side): 0= Normal    9.  Best Language (Name item, describe a picture and read sentences): 0= No aphasia    10. Dysarthria (Evaluate speech clarity by patient repeating listed words): 0= Normal articulation    11. Extinction and Inattention (Use information from prior testing to identify neglect or  double simultaneous stimuli testing): 0= No neglect    Total NIH Score: 2    Objective Data:    Labs:  No results found for: PROTHROMBTM, INR   Lab Results   Component Value Date/Time    WBC 11.1 (H) 09/26/2019 12:53 PM    RBC 4.89 09/26/2019 12:53 PM    HEMOGLOBIN 16.3 09/26/2019 12:53 PM    HEMATOCRIT 46.3 09/26/2019 12:53 PM    MCV 94.7  09/26/2019 12:53 PM    MCH 33.3 (H) 09/26/2019 12:53 PM    MCHC 35.2 09/26/2019 12:53 PM    MPV 9.4 09/26/2019 12:53 PM    NEUTSPOLYS 67.60 09/26/2019 12:53 PM    LYMPHOCYTES 25.20 09/26/2019 12:53 PM    MONOCYTES 6.50 09/26/2019 12:53 PM    EOSINOPHILS 0.00 09/26/2019 12:53 PM    BASOPHILS 0.40 09/26/2019 12:53 PM      Lab Results   Component Value Date/Time    SODIUM 139 09/28/2019 02:16 AM    POTASSIUM 3.5 (L) 09/28/2019 02:16 AM    CHLORIDE 106 09/28/2019 02:16 AM    CO2 22 09/28/2019 02:16 AM    GLUCOSE 120 (H) 09/28/2019 02:16 AM    BUN 6 (L) 09/28/2019 02:16 AM    CREATININE 0.83 09/28/2019 02:16 AM      Lab Results   Component Value Date/Time    CHOLSTRLTOT 160 09/23/2019 09:00 AM    LDL 85 09/23/2019 09:00 AM    HDL 40 09/23/2019 09:00 AM    TRIGLYCERIDE 175 (H) 09/23/2019 09:00 AM       Lab Results   Component Value Date/Time    ALKPHOSPHAT 130 (H) 09/27/2019 02:26 AM    ASTSGOT 19 09/27/2019 02:26 AM    ALTSGPT 16 09/27/2019 02:26 AM    TBILIRUBIN 1.0 09/27/2019 02:26 AM        Imaging/Testing:  I reviewed the patient's EKG 9/24/2019 as detailed in chart to note sinus bradycardia.    Assessment and Plan:    Roby Viramontes II is a 63 y.o. male with relevant history of prior hippocampal surgery admitted to the hospital for metabolic derangements for whom neurology was consulted to address acute onset focal deficits.  Etiology of symptoms are representative of a post ictal phenomenon in the setting of a witnessed seizure.  Patient does have history of epilepsy, albeit prior seizures were of a different semiology.  Seizure threshold in this patient could be lowered in the setting of toxic metabolic derangements, versus new intracranial lesion, versus scarring of the temporal lobe status post surgery.  The patient is not a candidate for thrombolytic therapy, nor mechanical intra-arterial intervention given this is more likely seizure, and not stroke.  Deficits are rapidly improving.    Plan:  - load with  Keppra 2g IV and continue 1g BID  - lorazepam 2mg IV q6hr PRN >3 CPS's in 24 hours, >2 GTC's in 24 hours, and any GTC lasting longer than 5 minutes  - rEEG as the patient has new seizure type; identify focality  - MRI brain with temporal lobe protocol / coronal cuts  - toxic metabolic work-up and correct and treat all abnormalities  - seizure precautions  - Patient will ultimately need follow-up with neurology clinic outpatient after discharge    The evaluation of the patient, and recommended management, was discussed with Dr. Reva Velázquez and nursing.  The patient's wife questions were answered in detail.    Davian Mcgraw MD  Director, [facility], [facility]  Neurohospitalist, [facility]  Clinical  of Neurology, [employer], [employer]  t) [phone] (f) [phone]

## 2019-09-28 NOTE — PROGRESS NOTES
Report received from Natacha RN, patient care assumed. Rapid response called on patient for witnessed seizure per wife and nursing staff. Dr Velázquez and Dr Mcgraw with neurology at bedside. See rapid response documentation. Initial assessment completed. Updated wife on plan of care. No questions at this time.

## 2019-09-29 LAB
ANION GAP SERPL CALC-SCNC: 10 MMOL/L (ref 0–11.9)
BUN SERPL-MCNC: 9 MG/DL (ref 8–22)
CALCIUM SERPL-MCNC: 9.2 MG/DL (ref 8.5–10.5)
CHLORIDE SERPL-SCNC: 104 MMOL/L (ref 96–112)
CO2 SERPL-SCNC: 23 MMOL/L (ref 20–33)
CREAT SERPL-MCNC: 0.89 MG/DL (ref 0.5–1.4)
ERYTHROCYTE [DISTWIDTH] IN BLOOD BY AUTOMATED COUNT: 45.1 FL (ref 35.9–50)
GLUCOSE BLD-MCNC: 107 MG/DL (ref 65–99)
GLUCOSE BLD-MCNC: 109 MG/DL (ref 65–99)
GLUCOSE BLD-MCNC: 113 MG/DL (ref 65–99)
GLUCOSE SERPL-MCNC: 94 MG/DL (ref 65–99)
HCT VFR BLD AUTO: 49.8 % (ref 42–52)
HGB BLD-MCNC: 17.4 G/DL (ref 14–18)
MAGNESIUM SERPL-MCNC: 2.4 MG/DL (ref 1.5–2.5)
MCH RBC QN AUTO: 33 PG (ref 27–33)
MCHC RBC AUTO-ENTMCNC: 34.9 G/DL (ref 33.7–35.3)
MCV RBC AUTO: 94.3 FL (ref 81.4–97.8)
PHOSPHATE SERPL-MCNC: 3.1 MG/DL (ref 2.5–4.5)
PLATELET # BLD AUTO: 165 K/UL (ref 164–446)
PMV BLD AUTO: 9.2 FL (ref 9–12.9)
POTASSIUM SERPL-SCNC: 3.8 MMOL/L (ref 3.6–5.5)
RBC # BLD AUTO: 5.28 M/UL (ref 4.7–6.1)
SODIUM SERPL-SCNC: 137 MMOL/L (ref 135–145)
WBC # BLD AUTO: 9.5 K/UL (ref 4.8–10.8)

## 2019-09-29 PROCEDURE — 700102 HCHG RX REV CODE 250 W/ 637 OVERRIDE(OP): Performed by: HOSPITALIST

## 2019-09-29 PROCEDURE — A9270 NON-COVERED ITEM OR SERVICE: HCPCS | Performed by: INTERNAL MEDICINE

## 2019-09-29 PROCEDURE — 700111 HCHG RX REV CODE 636 W/ 250 OVERRIDE (IP): Performed by: HOSPITALIST

## 2019-09-29 PROCEDURE — 700102 HCHG RX REV CODE 250 W/ 637 OVERRIDE(OP): Performed by: PSYCHIATRY & NEUROLOGY

## 2019-09-29 PROCEDURE — 99233 SBSQ HOSP IP/OBS HIGH 50: CPT | Performed by: PSYCHIATRY & NEUROLOGY

## 2019-09-29 PROCEDURE — 85027 COMPLETE CBC AUTOMATED: CPT

## 2019-09-29 PROCEDURE — 36415 COLL VENOUS BLD VENIPUNCTURE: CPT

## 2019-09-29 PROCEDURE — 99232 SBSQ HOSP IP/OBS MODERATE 35: CPT | Performed by: INTERNAL MEDICINE

## 2019-09-29 PROCEDURE — A9270 NON-COVERED ITEM OR SERVICE: HCPCS | Performed by: HOSPITALIST

## 2019-09-29 PROCEDURE — 84100 ASSAY OF PHOSPHORUS: CPT

## 2019-09-29 PROCEDURE — 82962 GLUCOSE BLOOD TEST: CPT

## 2019-09-29 PROCEDURE — 700102 HCHG RX REV CODE 250 W/ 637 OVERRIDE(OP): Performed by: INTERNAL MEDICINE

## 2019-09-29 PROCEDURE — 770020 HCHG ROOM/CARE - TELE (206)

## 2019-09-29 PROCEDURE — 80048 BASIC METABOLIC PNL TOTAL CA: CPT

## 2019-09-29 PROCEDURE — A9270 NON-COVERED ITEM OR SERVICE: HCPCS | Performed by: PSYCHIATRY & NEUROLOGY

## 2019-09-29 PROCEDURE — 83735 ASSAY OF MAGNESIUM: CPT

## 2019-09-29 RX ORDER — ALPRAZOLAM 0.25 MG/1
0.25 TABLET ORAL ONCE
Status: COMPLETED | OUTPATIENT
Start: 2019-09-29 | End: 2019-09-29

## 2019-09-29 RX ORDER — LISINOPRIL 20 MG/1
20 TABLET ORAL
Status: DISCONTINUED | OUTPATIENT
Start: 2019-09-29 | End: 2019-09-30 | Stop reason: HOSPADM

## 2019-09-29 RX ADMIN — QUETIAPINE FUMARATE 25 MG: 25 TABLET ORAL at 21:55

## 2019-09-29 RX ADMIN — ESCITALOPRAM OXALATE 20 MG: 10 TABLET ORAL at 21:55

## 2019-09-29 RX ADMIN — LEVETIRACETAM 1000 MG: 500 TABLET ORAL at 05:15

## 2019-09-29 RX ADMIN — AMLODIPINE BESYLATE 10 MG: 10 TABLET ORAL at 05:15

## 2019-09-29 RX ADMIN — ATORVASTATIN CALCIUM 40 MG: 40 TABLET, FILM COATED ORAL at 21:55

## 2019-09-29 RX ADMIN — LEVETIRACETAM 1000 MG: 500 TABLET ORAL at 21:55

## 2019-09-29 RX ADMIN — POTASSIUM CHLORIDE 40 MEQ: 1500 TABLET, EXTENDED RELEASE ORAL at 05:15

## 2019-09-29 RX ADMIN — LABETALOL HYDROCHLORIDE 10 MG: 5 INJECTION INTRAVENOUS at 05:16

## 2019-09-29 RX ADMIN — ALPRAZOLAM 0.25 MG: 0.25 TABLET ORAL at 03:35

## 2019-09-29 RX ADMIN — POTASSIUM CHLORIDE 40 MEQ: 1500 TABLET, EXTENDED RELEASE ORAL at 20:37

## 2019-09-29 RX ADMIN — ENOXAPARIN SODIUM 40 MG: 100 INJECTION SUBCUTANEOUS at 05:16

## 2019-09-29 RX ADMIN — LISINOPRIL 20 MG: 20 TABLET ORAL at 08:31

## 2019-09-29 RX ADMIN — MAGNESIUM GLUCONATE 500 MG ORAL TABLET 400 MG: 500 TABLET ORAL at 05:15

## 2019-09-29 ASSESSMENT — ENCOUNTER SYMPTOMS
CHILLS: 0
FLANK PAIN: 0
BACK PAIN: 0
NAUSEA: 1
NECK PAIN: 0
SHORTNESS OF BREATH: 0
FEVER: 0
SEIZURES: 1
WEAKNESS: 0
EYE DISCHARGE: 0
PALPITATIONS: 0
VOMITING: 0
COUGH: 0
DIARRHEA: 0
FOCAL WEAKNESS: 0
HALLUCINATIONS: 0
TREMORS: 0
ABDOMINAL PAIN: 0
EYE PAIN: 0
EYE REDNESS: 0
DIAPHORESIS: 0
WHEEZING: 0
STRIDOR: 0
SPEECH CHANGE: 0
SENSORY CHANGE: 0

## 2019-09-29 ASSESSMENT — LIFESTYLE VARIABLES: SUBSTANCE_ABUSE: 0

## 2019-09-29 NOTE — PROGRESS NOTES
"Neurology Progress Note  Neurohospitalist Service, [facility]    HPI 9/28/19: The following hospital course was obstructed from the chart per Dr. Ravindra Hsieh's note 9/27/19: \"63-year-old male history of recurrent episodes of weakness with nausea, vomiting,  recently discharged home on 9/25 with findings of acute gastroenteritis with low phosphorus, potassium readmitted with findings of severe low phosphorus.  In addition patient's magnesium and potassium levels were low.  Patient admitted for electrolyte replacement and further work-up.\" The history that I gathered at bedside after the code stroke was called is as follows.  The patient was last seen normal by nursing at 5:15 AM 9/28/2019 when he was given his meds, conversive, moving symmetrically.  The stroke alert was called at 7:19 AM after a witnessed seizure by the patient's wife.  Seizure described as tonic-clonic jerking of bilateral arms with head and eye version lasting about a minute.  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  The patient has been off seizure medication and seizure-free since the surgery.  The code was called for post ictal symptoms of aphasia, right facial droop, and right arm weakness.  Upon my arrival to the bedside, the symptoms were already beginning to improve.  There was no loss of continence, no tongue biting, but the patient was mildly confused and fatigued, closing his eyes, but easily redirectable.  Denies any pain, numbness, diplopia, or headache.    Interval History 9/29/19: No acute events overnight.  Completed neuroimaging studies as previously requested.  Patient is back to his baseline, looks and feels great.  No complaints.  No headache, no pain, asking when he can be discharged.    Review of systems: In addition to what is detailed in the HPI above, (and scanned into the chart if and when applicable), " all other systems reviewed and are negative.    Past Medical History:    has no past medical history on file.    FHx:  family history is not on file.    SHx:   reports that he has quit smoking. His smoking use included cigarettes. He has never used smokeless tobacco. He reports that he drank alcohol. He reports that he has current or past drug history. Drugs: Marijuana and Inhaled.    Allergies:  No Known Allergies    Medications:    Current Facility-Administered Medications:   •  lisinopril (PRINIVIL) tablet 20 mg, 20 mg, Oral, Q DAY, Reva Velázquez M.D., 20 mg at 09/29/19 0831  •  LORazepam (ATIVAN) injection 2 mg, 2 mg, Intravenous, Q4HRS PRN, Davian Mcgraw M.D.  •  amLODIPine (NORVASC) tablet 10 mg, 10 mg, Oral, Q DAY, Reva Velázquez M.D., 10 mg at 09/29/19 0515  •  enalaprilat (VASOTEC) injection 1.25 mg, 1.25 mg, Intravenous, Q6HRS PRN, Reva Velázquez M.D., 1.25 mg at 09/28/19 0912  •  levETIRAcetam (KEPPRA) tablet 1,000 mg, 1,000 mg, Oral, BID, Davian Mcgraw M.D., 1,000 mg at 09/29/19 0515  •  calcium carbonate (TUMS) chewable tab 500 mg, 500 mg, Oral, BID PRN, Reva Velázquez M.D., 500 mg at 09/28/19 1811  •  potassium chloride SA (Kdur) tablet 40 mEq, 40 mEq, Oral, BID, Ravindra Hsieh M.D., 40 mEq at 09/29/19 0515  •  vitamin D (Ergocalciferol) (DRISDOL) capsule 50,000 Units, 50,000 Units, Oral, Q7 DAYS, Ravindra Hsieh M.D., 50,000 Units at 09/27/19 1753  •  magnesium oxide (MAG-OX) tablet 400 mg, 400 mg, Oral, DAILY, Ravindra Hsieh M.D., 400 mg at 09/29/19 0515  •  atorvastatin (LIPITOR) tablet 40 mg, 40 mg, Oral, Q EVENING, Levente Levai, M.D., 40 mg at 09/28/19 1939  •  escitalopram (LEXAPRO) tablet 20 mg, 20 mg, Oral, Q EVENING, Kenroy Dukes M.D., 20 mg at 09/28/19 1939  •  QUEtiapine (SEROQUEL) tablet 25 mg, 25 mg, Oral, Q EVENING, Kenroy Dukes M.D., 25 mg at 09/28/19 1939  •  senna-docusate (PERICOLACE or SENOKOT S) 8.6-50 MG per tablet 2 Tab, 2 Tab, Oral, BID, 2 Tab at 09/28/19 0509 **AND** polyethylene  glycol/lytes (MIRALAX) PACKET 1 Packet, 1 Packet, Oral, QDAY PRN **AND** magnesium hydroxide (MILK OF MAGNESIA) suspension 30 mL, 30 mL, Oral, QDAY PRN **AND** bisacodyl (DULCOLAX) suppository 10 mg, 10 mg, Rectal, QDAY PRN, Kenroy Dukes M.D.  •  enoxaparin (LOVENOX) inj 40 mg, 40 mg, Subcutaneous, DAILY, Kenroy Dukes M.D., 40 mg at 09/29/19 0516  •  acetaminophen (TYLENOL) tablet 650 mg, 650 mg, Oral, Q6HRS PRN, Kenroy Dukes M.D., 650 mg at 09/28/19 1559  •  labetalol (NORMODYNE,TRANDATE) injection 10 mg, 10 mg, Intravenous, Q4HRS PRN, Kenroy Dukes M.D., 10 mg at 09/29/19 0516  •  ondansetron (ZOFRAN) syringe/vial injection 4 mg, 4 mg, Intravenous, Q4HRS PRN, Kenroy Dukes M.D., 4 mg at 09/26/19 1830  •  ondansetron (ZOFRAN ODT) dispertab 4 mg, 4 mg, Oral, Q4HRS PRN, Kenroy Dukes M.D.  •  prochlorperazine (COMPAZINE) injection 10 mg, 10 mg, Intravenous, Q6HRS PRN, Adalgisa Pendleton M.D., 10 mg at 09/28/19 0817    Physical Examination:    Vitals:    09/29/19 0036 09/29/19 0403 09/29/19 0718 09/29/19 0830   BP: 107/64 (!) 163/95 143/93 104/74   Pulse: 62 60 67    Resp: 18 18 18    Temp: 37.3 °C (99.1 °F) 37.3 °C (99.1 °F) 36.9 °C (98.4 °F)    TempSrc: Temporal Temporal Temporal    SpO2: 95% 95% 96%    Weight:           General: Patient is awake and in no acute distress  Neck: There is normal range of motion  CV: RRR  Psychiatric: Mood and affect appear normal    NEUROLOGICAL EXAM:     Mental status: Awake, alert and fully oriented, follows commands  Speech and language: speech is clear and fluent. The patient is able to name and repeat.  Cranial nerve exam: Pupils are equal, round and reactive to light bilaterally. Visual fields are full. There is no nystagmus. Extraocular muscles are intact. Face is symmetric. Sensation in the face is intact to light touch. Uvula is midline. Palate elevates symmetrically. Tongue is midline. Sternocleidomastoid muscles exhibit normal strength bilaterally.  Motor  exam: Strength is 5/5 in all extremities both distally and proximally. Tone is normal. No abnormal movements were seen on exam.  Sensory exam No sensory deficits identified   Deep tendon reflexes:  2+ throughout. Toes down-going bilaterally.  Coordination: no ataxia   Gait: deferred    Objective Data:    Labs:  No results found for: PROTHROMBTM, INR   Lab Results   Component Value Date/Time    WBC 9.5 09/29/2019 02:38 AM    RBC 5.28 09/29/2019 02:38 AM    HEMOGLOBIN 17.4 09/29/2019 02:38 AM    HEMATOCRIT 49.8 09/29/2019 02:38 AM    MCV 94.3 09/29/2019 02:38 AM    MCH 33.0 09/29/2019 02:38 AM    MCHC 34.9 09/29/2019 02:38 AM    MPV 9.2 09/29/2019 02:38 AM    NEUTSPOLYS 67.60 09/26/2019 12:53 PM    LYMPHOCYTES 25.20 09/26/2019 12:53 PM    MONOCYTES 6.50 09/26/2019 12:53 PM    EOSINOPHILS 0.00 09/26/2019 12:53 PM    BASOPHILS 0.40 09/26/2019 12:53 PM      Lab Results   Component Value Date/Time    SODIUM 137 09/29/2019 02:38 AM    POTASSIUM 3.8 09/29/2019 02:38 AM    CHLORIDE 104 09/29/2019 02:38 AM    CO2 23 09/29/2019 02:38 AM    GLUCOSE 94 09/29/2019 02:38 AM    BUN 9 09/29/2019 02:38 AM    CREATININE 0.89 09/29/2019 02:38 AM      Lab Results   Component Value Date/Time    CHOLSTRLTOT 160 09/23/2019 09:00 AM    LDL 85 09/23/2019 09:00 AM    HDL 40 09/23/2019 09:00 AM    TRIGLYCERIDE 175 (H) 09/23/2019 09:00 AM       Lab Results   Component Value Date/Time    ALKPHOSPHAT 130 (H) 09/27/2019 02:26 AM    ASTSGOT 19 09/27/2019 02:26 AM    ALTSGPT 16 09/27/2019 02:26 AM    TBILIRUBIN 1.0 09/27/2019 02:26 AM        Imaging/Testing:  I reviewed the patient's EKG 9/24/2019 as detailed in chart to note sinus bradycardia.  I personally interpreted the brain MRI 9/28/2019 and note postsurgical changes in the left temporal lobe on coronal sections, and mild cerebral atrophy.  No acute stroke.    Assessment and Plan:    Roby Viramontes II is a 63 y.o. male with relevant history of prior hippocampal surgery admitted to the  [facility] for metabolic derangements for whom neurology was consulted to address acute onset focal deficits.  Etiology of symptoms are representative of a post ictal phenomenon in the setting of a witnessed seizure.  Seizure threshold in this patient could be lowered in the setting of toxic metabolic derangements, versus scarring of the temporal lobe status post surgery.  Patient is now back to baseline and managed on Keppra.    Plan:  -Continue with Keppra 1g BID  -Can discontinue rEEG  - toxic metabolic work-up and correct and treat all abnormalities  - seizure precautions  - Outpatient neurology clinic follow-up visit    The evaluation of the patient, and recommended management, was discussed with Dr. Reva Velázquez and nursing.  The patient's wife questions were answered in detail. No further recommendations or further studies from a neurological standpoint at this time. Please re-consult if you have further questions or there is a change in status.     Davian Mcgraw MD  Director, [facility] St[facility], [facility]  Neurohospitalist, [facility]  Clinical  of Neurology, [employer]  t) [phone] (f) [phone]

## 2019-09-29 NOTE — PROGRESS NOTES
Hospital Medicine Daily Progress Note    Date of Service  9/29/2019    Chief Complaint  63 y.o. male admitted 9/26/2019 with weakness with nausea/vomiting.    Hospital Course    63-year-old male history of recurrent episodes of weakness with nausea, vomiting,  recently discharged home on 9/25 with findings of acute gastroenteritis with low phosphorus, potassium readmitted with findings of severe low phosphorus.  In addition patient's magnesium and potassium levels were low.  Patient admitted for electrolyte replacement and further work-up.    Interval Problem Update  Pt seen and examined,no acute events overnight no more seizure episodes, resting in bed. Afebrile, denies any CP or SOB   BP still not well controlled, adding lisinopril.    Consultants/Specialty  None     Code Status  Full code    Disposition  Td     Review of Systems  Review of Systems   Constitutional: Positive for malaise/fatigue (Resolved). Negative for chills, diaphoresis and fever.   HENT: Negative for congestion.    Eyes: Negative for pain, discharge and redness.   Respiratory: Negative for cough, shortness of breath, wheezing and stridor.    Cardiovascular: Negative for chest pain, palpitations and leg swelling.   Gastrointestinal: Positive for nausea (Resolved). Negative for abdominal pain, diarrhea and vomiting.   Genitourinary: Negative for flank pain and hematuria.   Musculoskeletal: Negative for back pain, joint pain and neck pain.   Neurological: Positive for seizures. Negative for tremors, sensory change, speech change, focal weakness and weakness.   Psychiatric/Behavioral: Negative for hallucinations and substance abuse.        Physical Exam  Temp:  [36.9 °C (98.4 °F)-37.4 °C (99.4 °F)] 36.9 °C (98.4 °F)  Pulse:  [60-73] 67  Resp:  [15-18] 18  BP: (104-169)/(64-95) 104/74  SpO2:  [95 %-98 %] 96 %    Physical Exam   Constitutional: He is oriented to person, place, and time. No distress.   HENT:   Head: Normocephalic and atraumatic.    Nose: Nose normal.   Eyes: Conjunctivae and EOM are normal. No scleral icterus.   Neck: Normal range of motion. Neck supple. No JVD present.   Cardiovascular: Normal rate and regular rhythm.   No murmur heard.  Pulmonary/Chest: Effort normal. No respiratory distress. He has no rales.   Abdominal: Soft. Bowel sounds are normal. He exhibits no distension. There is no tenderness.   Musculoskeletal: He exhibits no edema or tenderness.   Neurological: He is alert and oriented to person, place, and time. No cranial nerve deficit.   No focal weakness   Skin: Skin is warm and dry. He is not diaphoretic. No pallor.   Psychiatric: He has a normal mood and affect. His behavior is normal.   Nursing note and vitals reviewed.      Fluids  No intake or output data in the 24 hours ending 09/29/19 1029    Laboratory  Recent Labs     09/26/19  1253 09/29/19  0238   WBC 11.1* 9.5   RBC 4.89 5.28   HEMOGLOBIN 16.3 17.4   HEMATOCRIT 46.3 49.8   MCV 94.7 94.3   MCH 33.3* 33.0   MCHC 35.2 34.9   RDW 46.1 45.1   PLATELETCT 171 165   MPV 9.4 9.2     Recent Labs     09/27/19  1525 09/28/19  0216 09/29/19  0238   SODIUM 141 139 137   POTASSIUM 3.2* 3.5* 3.8   CHLORIDE 106 106 104   CO2 23 22 23   GLUCOSE 112* 120* 94   BUN 6* 6* 9   CREATININE 0.79 0.83 0.89   CALCIUM 8.8 9.1 9.2                   Imaging  MR-BRAIN-W/O   Final Result      1.  Postsurgical changes in the left temporal lobe.   2.  Mild cerebral atrophy.   3.  No acute abnormality.   4.  Mild chronic microvascular ischemic disease.      CT-CHEST (THORAX) WITH   Final Result      Solitary 4 mm uncalcified left lower lobe nodule. See below recommendations.      Low Risk: No routine follow-up      High Risk: Optional CT at 12 months      Comments: Nodules less than 6 mm do not require routine follow-up, but certain patients at high risk with suspicious nodule morphology, upper lobe location, or both may warrant 12-month follow-up.      Low Risk - Minimal or absent history of  smoking and of other known risk factors.      High Risk - History of smoking or of other known risk factors.      Note: These recommendations do not apply to lung cancer screening, patients with immunosuppression, or patients with known primary cancer.      Fleischner Society 2017 Guidelines for Management of Incidentally Detected Pulmonary Nodules in Adults      US-RENAL   Final Result      2 cm right renal cyst. Otherwise unremarkable renal ultrasound.           Assessment/Plan  * Hypophosphatemia  Assessment & Plan  Patient with low vitamin D level which may cause low phosphorus.  PTH normal -- No hyperparathyroidism that could also cause low phosphorus.  Other causes which may be multifactorial in his findings including recent diarrhea, nausea vomiting, poor intake,, vitamin D deficiency, over exertion heavy exercising with hyperventilation  (patient reports running 3 miles prior to presenting with symptoms.) .   Fu 24 hr urine phosphorus pending   His phosphorus levels have corrected - will continue to monitor.       Nausea & vomiting  Assessment & Plan  Of unclear etiology.  Recent diagnosis of gastroenteritis.  Abdominal exam benign.  LFTs and lipase normal-unlikely pancreas biliary source.  Patient was very hypertensive initially- ? Associated symptomatic hypertension , electrolyte depletion symptoms.  Resolved   Monitor     Hypokalemia  Assessment & Plan  Likely assoc with recent N/V , nutritional .    Corrected low Mag with IV magnesium   Resolved       Elevated alkaline phosphatase level  Assessment & Plan  Due to the elevation of alkaline phosphatase and low low phosphorus and potassium levels -- will order GGT to eval for biliary source     Incidental lung nodule- (present on admission)  Assessment & Plan  Outpatient follow up.     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  Neurology following , after a loading dose with  keppra now on 1000 mg BID, MRI neg for acute issues  EEG pending   Appreciate neurology rec.     Hypertension  Assessment & Plan  No reported history of.  Initial SBP 190s systolic.    Still not well controlled,on amlodipine, also on IV prn labetalol and vasotec, also adding lisinopril   24-hour urine cortisol wnl   Advised patient outpatient follow-up with newly established PCP.     Leukocytosis  Assessment & Plan  Leukocytosis is mild at this point may be stress response versus underlying infection.  Patient without fevers .  Patient recent diagnosis of gastroenteritis--symptoms resolving--  will monitor for recurrent/acute symptoms .    Patient UA negative.  Blood cultures negative to date.  Trended down.         Hyperglycemia  Assessment & Plan  Fu HgbA1c 5.7   Monitor          VTE prophylaxis: Lovenox.

## 2019-09-29 NOTE — PROGRESS NOTES
Assumed care of pt, bedside report received from Day RN Natacha Bazzi. Call light within reach. Bed alarm on. Addressed POC with pt and wife who is at bedside.

## 2019-09-29 NOTE — CARE PLAN
Problem: Nutritional:  Goal: Achieve adequate nutritional intake  Description  Patient will consume >50% of meals   Outcome: NOT MET

## 2019-09-29 NOTE — DIETARY
Nutrition Services: Consult  Day 2 of admit.  Roby Viramontes II is a 63 y.o. male with admitting DX of hypophosphatemia, hypokalemia, and abdominal pain.     Consult received for poor PO intake.  Per chart, no meals or snacks documented at this time.  Patient recently discharged 9/25 and presents with n/v and weakness.  Appetite has been poor, though he is looking forward to the meals he selected today to try.  No current n/v, diarrhea, or constipation.  RD continues to monitor for sufficient intake.     -LBM: 9/28.  -Meds: Prinivil, Mag-ox, Kdur, Senokot, Vitamin D.  Other meds and labs reviewed at this time.    Malnutrition Risk: Patient does not meet ASPEN Guideline criteria at this time.     Recommendations/Plan:  1. Encourage intake of meals.  2. Document intake of all meals as % taken in ADL's to provide interdisciplinary communication across all shifts.   3. Monitor weight.  4. Nutrition rep will continue to see patient for ongoing meal and snack preferences.  5. Obtain supplement order per RD as needed.    RD following

## 2019-09-29 NOTE — CARE PLAN
Problem: Communication  Goal: The ability to communicate needs accurately and effectively will improve  Outcome: PROGRESSING AS EXPECTED  Intervention: [place] patient and significant other/support system to call light to alert staff of needs  Flowsheets (Taken 9/28/2019 2812)  Oriented to:: All of the Following : Location of Bathroom, Visiting Policy, Unit Routine, Call Light and Bedside Controls, Bedside Rail Policy, Smoking Policy, Rights and Responsibilities, Bedside Report, and Patient Education Notebook     Problem: Infection  Goal: Will remain free from infection  Outcome: PROGRESSING AS EXPECTED  Intervention: Implement standard precautions and perform hand washing before and after patient contact  Note:   Staff implementing standard precautions and perform hand washing before and after pt contact.

## 2019-09-30 VITALS
HEART RATE: 64 BPM | TEMPERATURE: 99.4 F | WEIGHT: 154.98 LBS | OXYGEN SATURATION: 98 % | DIASTOLIC BLOOD PRESSURE: 18 MMHG | BODY MASS INDEX: 21.02 KG/M2 | SYSTOLIC BLOOD PRESSURE: 135 MMHG | RESPIRATION RATE: 16 BRPM

## 2019-09-30 LAB
ANION GAP SERPL CALC-SCNC: 8 MMOL/L (ref 0–11.9)
BUN SERPL-MCNC: 15 MG/DL (ref 8–22)
CALCIUM SERPL-MCNC: 9.3 MG/DL (ref 8.5–10.5)
CHLORIDE SERPL-SCNC: 103 MMOL/L (ref 96–112)
CO2 SERPL-SCNC: 23 MMOL/L (ref 20–33)
CREAT SERPL-MCNC: 0.95 MG/DL (ref 0.5–1.4)
GLUCOSE SERPL-MCNC: 100 MG/DL (ref 65–99)
PHOSPHATE SERPL-MCNC: 3 MG/DL (ref 2.5–4.5)
POTASSIUM SERPL-SCNC: 4.5 MMOL/L (ref 3.6–5.5)
SODIUM SERPL-SCNC: 134 MMOL/L (ref 135–145)

## 2019-09-30 PROCEDURE — 84100 ASSAY OF PHOSPHORUS: CPT

## 2019-09-30 PROCEDURE — A9270 NON-COVERED ITEM OR SERVICE: HCPCS | Performed by: INTERNAL MEDICINE

## 2019-09-30 PROCEDURE — 700102 HCHG RX REV CODE 250 W/ 637 OVERRIDE(OP): Performed by: HOSPITALIST

## 2019-09-30 PROCEDURE — 80048 BASIC METABOLIC PNL TOTAL CA: CPT

## 2019-09-30 PROCEDURE — 700102 HCHG RX REV CODE 250 W/ 637 OVERRIDE(OP): Performed by: INTERNAL MEDICINE

## 2019-09-30 PROCEDURE — 36415 COLL VENOUS BLD VENIPUNCTURE: CPT

## 2019-09-30 PROCEDURE — A9270 NON-COVERED ITEM OR SERVICE: HCPCS | Performed by: HOSPITALIST

## 2019-09-30 PROCEDURE — 700111 HCHG RX REV CODE 636 W/ 250 OVERRIDE (IP): Performed by: HOSPITALIST

## 2019-09-30 PROCEDURE — A9270 NON-COVERED ITEM OR SERVICE: HCPCS | Performed by: PSYCHIATRY & NEUROLOGY

## 2019-09-30 PROCEDURE — 700102 HCHG RX REV CODE 250 W/ 637 OVERRIDE(OP): Performed by: PSYCHIATRY & NEUROLOGY

## 2019-09-30 PROCEDURE — 99239 HOSP IP/OBS DSCHRG MGMT >30: CPT | Performed by: INTERNAL MEDICINE

## 2019-09-30 RX ORDER — ERGOCALCIFEROL 1.25 MG/1
50000 CAPSULE ORAL
Qty: 5 CAP | Refills: 0 | Status: SHIPPED | OUTPATIENT
Start: 2019-10-04 | End: 2019-11-14

## 2019-09-30 RX ORDER — LEVETIRACETAM 1000 MG/1
1000 TABLET ORAL 2 TIMES DAILY
Qty: 60 TAB | Refills: 0 | Status: SHIPPED | OUTPATIENT
Start: 2019-09-30 | End: 2019-11-14

## 2019-09-30 RX ORDER — LISINOPRIL 20 MG/1
20 TABLET ORAL DAILY
Qty: 30 TAB | Refills: 0 | Status: SHIPPED | OUTPATIENT
Start: 2019-10-01 | End: 2019-11-14

## 2019-09-30 RX ORDER — AMLODIPINE BESYLATE 10 MG/1
10 TABLET ORAL DAILY
Qty: 30 TAB | Refills: 0 | Status: SHIPPED | OUTPATIENT
Start: 2019-10-01 | End: 2019-11-14

## 2019-09-30 RX ADMIN — MAGNESIUM GLUCONATE 500 MG ORAL TABLET 400 MG: 500 TABLET ORAL at 05:24

## 2019-09-30 RX ADMIN — LISINOPRIL 20 MG: 20 TABLET ORAL at 05:24

## 2019-09-30 RX ADMIN — POTASSIUM CHLORIDE 40 MEQ: 1500 TABLET, EXTENDED RELEASE ORAL at 05:24

## 2019-09-30 RX ADMIN — ENOXAPARIN SODIUM 40 MG: 100 INJECTION SUBCUTANEOUS at 05:24

## 2019-09-30 RX ADMIN — LEVETIRACETAM 1000 MG: 500 TABLET ORAL at 05:24

## 2019-09-30 RX ADMIN — AMLODIPINE BESYLATE 10 MG: 10 TABLET ORAL at 05:24

## 2019-09-30 NOTE — CARE PLAN
Problem: Communication  Goal: The ability to communicate needs accurately and effectively will improve  Outcome: PROGRESSING AS EXPECTED  Intervention: [place] patient and significant other/support system to call light to alert staff of needs  Flowsheets (Taken 9/28/2019 6424)  Oriented to:: All of the Following : Location of Bathroom, Visiting Policy, Unit Routine, Call Light and Bedside Controls, Bedside Rail Policy, Smoking Policy, Rights and Responsibilities, Bedside Report, and Patient Education Notebook     Problem: Infection  Goal: Will remain free from infection  Outcome: PROGRESSING AS EXPECTED  Intervention: Implement standard precautions and perform hand washing before and after patient contact  Note:   Staff implementing standard precautions and perform hand washing before and after pt contact.

## 2019-09-30 NOTE — DISCHARGE SUMMARY
Discharge Summary    CHIEF COMPLAINT ON ADMISSION  Chief Complaint   Patient presents with   • Abdominal Pain   • Shaking       Reason for Admission  Shaking; SOB; Unsteady     Admission Date  9/26/2019    CODE STATUS  Prior    HPI & HOSPITAL COURSE  This is a 63 y.o. Male with PMHX of seizures, who was admitted on 9/26/19 after presenting to ER complaining weakness, nausea and vomiting.  Pt was recently here for a gastroenteritis with nausea and vomiting and was just discharged home.   The patient apparently had some generalized weakness which brought him into the emergency room.  When he got here he had an episode of nausea vomiting.  Patient was evaluated in the emergency room found to have extremely low phosphorus levels. He was started on repletion and phosphorus started to improve. While in here pt had a seizure episode and neurology was consulted. 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   Pt was loaded with keppra and then started on 1000 mg BID. No more seizure episodes since.  Per neurology will need to follow up as outpt.  Pt also found to have HTN, he was started on amlodipine and also lisinopril, BP is now better controlled. Pt will need to follow up with primary care physician as outpt for it.  Pt feeling well today and will be discharged home today     The patient met 2-midnight criteria for an inpatient stay at the time of discharge.    Discharge Date  9/30/2019    FOLLOW UP ITEMS POST DISCHARGE  Neurology  PCP     DISCHARGE DIAGNOSES  Principal Problem:    Hypophosphatemia POA: Unknown  Active Problems:    Hypokalemia POA: Unknown    Nausea & vomiting POA: Unknown    Incidental lung nodule POA: Yes    Elevated alkaline phosphatase level POA: Unknown    Leukocytosis POA: Unknown    Hypertension POA: Unknown    Seizure (HCC) POA: Unknown    Hyperglycemia POA: Unknown  Resolved Problems:    * No resolved hospital  problems. *      FOLLOW UP  No future appointments.  Davian Mcgraw M.D.  [address]  [place] [address]  [place] [address] [phone]    Call in 3 days        MEDICATIONS ON DISCHARGE     Medication List      START taking these medications      Instructions   amLODIPine 10 MG Tabs  Start taking on:  10/1/2019  Commonly known as:  NORVASC   Take 1 Tab by mouth every day.  Dose:  10 mg     levetiracetam 1000 MG tablet  Commonly known as:  KEPPRA   Take 1 Tab by mouth 2 Times a Day.  Dose:  1,000 mg     lisinopril 20 MG Tabs  Start taking on:  10/1/2019  Commonly known as:  PRINIVIL   Take 1 Tab by mouth every day.  Dose:  20 mg     vitamin D (Ergocalciferol) 68755 units Caps capsule  Start taking on:  10/4/2019  Commonly known as:  DRISDOL   Take 1 Cap by mouth every 7 days.  Dose:  50,000 Units        CONTINUE taking these medications      Instructions   atorvastatin 40 MG Tabs  Commonly known as:  LIPITOR   Take 40 mg by mouth every evening.  Dose:  40 mg     escitalopram 20 MG tablet  Commonly known as:  LEXAPRO   Take 20 mg by mouth every evening.  Dose:  20 mg     QUEtiapine 25 MG Tabs  Commonly known as:  SEROQUEL   Take 25 mg by mouth every evening.  Dose:  25 mg        STOP taking these medications    cephALEXin 500 MG Caps  Commonly known as:  KEFLEX            Allergies  No Known Allergies    DIET  No orders of the defined types were placed in this encounter.      ACTIVITY  As tolerated.  Weight bearing as tolerated    CONSULTATIONS  Neurology     PROCEDURES  None     LABORATORY  Lab Results   Component Value Date    SODIUM 134 (L) 09/30/2019    POTASSIUM 4.5 09/30/2019    CHLORIDE 103 09/30/2019    CO2 23 09/30/2019    GLUCOSE 100 (H) 09/30/2019    BUN 15 09/30/2019    CREATININE 0.95 09/30/2019        Lab Results   Component Value Date    WBC 9.5 09/29/2019    HEMOGLOBIN 17.4 09/29/2019    HEMATOCRIT 49.8 09/29/2019    PLATELETCT 165 09/29/2019        Total time of the discharge process exceeds 40  minutes.

## 2019-09-30 NOTE — DISCHARGE INSTRUCTIONS
Discharge Instructions    Discharged to home by car with relative. Discharged via walking, hospital escort: Yes.  Special equipment needed: Not Applicable    Be sure to schedule a follow-up appointment with your primary care doctor or any specialists as instructed.     Discharge Plan:   Diet Plan: Discussed  Activity Level: Discussed  Smoking Cessation Offered: Patient Refused  Confirmed Follow up Appointment: Patient to Call and Schedule Appointment  Confirmed Symptoms Management: Discussed  Medication Reconciliation Updated: Yes  Influenza Vaccine Indication: Patient Refuses    I understand that a diet low in cholesterol, fat, and sodium is recommended for good health. Unless I have been given specific instructions below for another diet, I accept this instruction as my diet prescription.   Other diet: regular    Special Instructions: None    · Is patient discharged on Warfarin / Coumadin?   No     Hypophosphatemia  Hypophosphatemia means you have a lack of phosphates in your blood. Phosphates are important for the strength and structure of bones and teeth, and for muscle functioning. Low blood phosphate levels can cause a variety of symptoms and problems.  CAUSES  Causes of hypophosphatemia may include:  · Alcoholism.  · Chronic diarrhea.  · Starvation.  · Vitamin D deficiency.  · Excessive use of antacids.  · Use of steroid medicines.  · Hyperparathyroidism.  · Intestinal problems that interfere with normal absorption of nutrients.  · Diabetic ketoacidosis.  · Total parenteral nutrition.  · Nasogastric suction.  · Severe infections.  · Genetic kidney problems, such as autosomal dominant hypophosphatemic rickets.  · Oncogenic osteomalacia (a condition of low phosphate levels with certain types of tumors) .  · Surgery to remove a part of the liver.  · Organ transplantation.  · Sudden liver failure.  · Metabolic syndrome.  SYMPTOMS  · Bone pain.  · Bowed legs.  · Growth problems (short height).  · Weak  muscles.  · Confusion.  · Shortness of breath.  · Seizures.  DIAGNOSIS  Hypophosphatemia is usually diagnosed through blood tests.   TREATMENT  Treatment for hypophosphatemia includes giving phosphate supplements. These can be given by mouth or through the vein (intravenously), depending on the severity of the symptoms and deficiency. Vitamin D may also be given to treat hypophosphatemia.  HOME CARE INSTRUCTIONS  Consult with a dietitian as recommended to make sure you are eating the most healthful diet possible.  SEEK IMMEDIATE MEDICAL CARE IF:  · You develop chest pain.  · You have difficulty breathing.  · You develop increased weakness.  · You have a suspected bone fracture.  · You have severe pain.  · You develop mood, memory, or personality changes.  This information is not intended to replace advice given to you by your health care provider. Make sure you discuss any questions you have with your health care provider.  Document Released: 04/03/2012 Document Revised: 04/14/2014 Document Reviewed: 05/04/2016  [employer] Patient Education © 2017 [employer] Inc.    Amlodipine tablets  What is this medicine?  AMLODIPINE (am SUKHDEV di peen) is a calcium-channel blocker. It affects the amount of calcium found in your heart and muscle cells. This relaxes your blood vessels, which can reduce the amount of work the heart has to do. This medicine is used to lower high blood pressure. It is also used to prevent chest pain.  This medicine may be used for other purposes; ask your health care provider or pharmacist if you have questions.  COMMON BRAND NAME(S): Norvasc  What should I tell my health care provider before I take this medicine?  They need to know if you have any of these conditions:  -heart problems like heart failure or aortic stenosis  -liver disease  -an unusual or allergic reaction to amlodipine, other medicines, foods, dyes, or preservatives  -pregnant or trying to get pregnant  -breast-feeding  How  should I use this medicine?  Take this medicine by mouth with a glass of water. Follow the directions on the prescription label. Take your medicine at regular intervals. Do not take more medicine than directed.  Talk to your pediatrician regarding the use of this medicine in children. Special care may be needed. This medicine has been used in children as young as 6.  Persons over 65 years old may have a stronger reaction to this medicine and need smaller doses.  Overdosage: If you think you have taken too much of this medicine contact a poison control center or emergency room at once.  NOTE: This medicine is only for you. Do not share this medicine with others.  What if I miss a dose?  If you miss a dose, take it as soon as you can. If it is almost time for your next dose, take only that dose. Do not take double or extra doses.  What may interact with this medicine?  -herbal or dietary supplements  -local or general anesthetics  -medicines for high blood pressure  -medicines for prostate problems  -rifampin  This list may not describe all possible interactions. Give your health care provider a list of all the medicines, herbs, non-prescription drugs, or dietary supplements you use. Also tell them if you smoke, drink alcohol, or use illegal drugs. Some items may interact with your medicine.  What should I watch for while using this medicine?  Visit your doctor or health care professional for regular check ups. Check your blood pressure and pulse rate regularly. Ask your health care professional what your blood pressure and pulse rate should be, and when you should contact him or her.  This medicine may make you feel confused, dizzy or lightheaded. Do not drive, use machinery, or do anything that needs mental alertness until you know how this medicine affects you. To reduce the risk of dizzy or fainting spells, do not sit or stand up quickly, especially if you are an older patient. Avoid alcoholic drinks; they can  make you more dizzy.  Do not suddenly stop taking amlodipine. Ask your doctor or health care professional how you can gradually reduce the dose.  What side effects may I notice from receiving this medicine?  Side effects that you should report to your doctor or health care professional as soon as possible:  -allergic reactions like skin rash, itching or hives, swelling of the face, lips, or tongue  -breathing problems  -changes in vision or hearing  -chest pain  -fast, irregular heartbeat  -swelling of legs or ankles  Side effects that usually do not require medical attention (report to your doctor or health care professional if they continue or are bothersome):  -dry mouth  -facial flushing  -nausea, vomiting  -stomach gas, pain  -tired, weak  -trouble sleeping  This list may not describe all possible side effects. Call your doctor for medical advice about side effects. You may report side effects to FDA at 7-564-FDA-6699.  Where should I keep my medicine?  Keep out of the reach of children.  Store at room temperature between 59 and 86 degrees F (15 and 30 degrees C). Protect from light. Keep container tightly closed. Throw away any unused medicine after the expiration date.  NOTE: This sheet is a summary. It may not cover all possible information. If you have questions about this medicine, talk to your doctor, pharmacist, or health care provider.  © 2018 Elsevier/Gold Standard (2013-11-15 11:40:58)    Levetiracetam tablets  What is this medicine?  LEVETIRACETAM (adalberto ve tommy RA se jacome) is an antiepileptic drug. It is used with other medicines to treat certain types of seizures.  This medicine may be used for other purposes; ask your health care provider or pharmacist if you have questions.  COMMON BRAND NAME(S): Anya Jean  What should I tell my health care provider before I take this medicine?  They need to know if you have any of these conditions:  -kidney disease  -suicidal thoughts, plans, or attempt; a  previous suicide attempt by you or a family member  -an unusual or allergic reaction to levetiracetam, other medicines, foods, dyes, or preservatives  -pregnant or trying to get pregnant  -breast-feeding  How should I use this medicine?  Take this medicine by mouth with a glass of water. Follow the directions on the prescription label. Swallow the tablets whole. Do not crush or chew this medicine. You may take this medicine with or without food. Take your doses at regular intervals. Do not take your medicine more often than directed. Do not stop taking this medicine or any of your seizure medicines unless instructed by your doctor or health care professional. Stopping your medicine suddenly can increase your seizures or their severity.  A special MedGuide will be given to you by the pharmacist with each prescription and refill. Be sure to read this information carefully each time.  Contact your pediatrician or health care professional regarding the use of this medication in children. While this drug may be prescribed for children as young as 4 years of age for selected conditions, precautions do apply.  Overdosage: If you think you have taken too much of this medicine contact a poison control center or emergency room at once.  NOTE: This medicine is only for you. Do not share this medicine with others.  What if I miss a dose?  If you miss a dose, take it as soon as you can. If it is almost time for your next dose, take only that dose. Do not take double or extra doses.  What may interact with this medicine?  This medicine may interact with the following medications:  -carbamazepine  -colesevelam  -probenecid  -sevelamer  This list may not describe all possible interactions. Give your health care provider a list of all the medicines, herbs, non-prescription drugs, or dietary supplements you use. Also tell them if you smoke, drink alcohol, or use illegal drugs. Some items may interact with your medicine.  What should  I watch for while using this medicine?  Visit your doctor or health care professional for a regular check on your progress. Wear a medical identification bracelet or chain to say you have epilepsy, and carry a card that lists all your medications.  It is important to take this medicine exactly as instructed by your health care professional. When first starting treatment, your dose may need to be adjusted. It may take weeks or months before your dose is stable. You should contact your doctor or health care professional if your seizures get worse or if you have any new types of seizures.  You may get drowsy or dizzy. Do not drive, use machinery, or do anything that needs mental alertness until you know how this medicine affects you. Do not stand or sit up quickly, especially if you are an older patient. This reduces the risk of dizzy or fainting spells. Alcohol may interfere with the effect of this medicine. Avoid alcoholic drinks.  The use of this medicine may increase the chance of suicidal thoughts or actions. Pay special attention to how you are responding while on this medicine. Any worsening of mood, or thoughts of suicide or dying should be reported to your health care professional right away.  Women who become pregnant while using this medicine may enroll in the [place] Antiepileptic Drug Pregnancy Registry by calling 1-104.964.3629. This registry collects information about the safety of antiepileptic drug use during pregnancy.  What side effects may I notice from receiving this medicine?  Side effects that you should report to your doctor or health care professional as soon as possible:  -allergic reactions like skin rash, itching or hives, swelling of the face, lips, or tongue  -breathing problems  -dark urine  -general ill feeling or flu-like symptoms  -problems with balance, talking, walking  -unusually weak or tired  -worsening of mood, thoughts or actions of suicide or dying  -yellowing of the  eyes or skin  Side effects that usually do not require medical attention (report to your doctor or health care professional if they continue or are bothersome):  -diarrhea  -dizzy, drowsy  -headache  -loss of appetite  This list may not describe all possible side effects. Call your doctor for medical advice about side effects. You may report side effects to FDA at 2-443-JIU-1664.  Where should I keep my medicine?  Keep out of reach of children.  Store at room temperature between 15 and 30 degrees C (59 and 86 degrees F). Throw away any unused medicine after the expiration date.  NOTE: This sheet is a summary. It may not cover all possible information. If you have questions about this medicine, talk to your doctor, pharmacist, or health care provider.  © 2018 Elsevier/Gold Standard (2017-01-19 09:43:54)    Lisinopril tablets  What is this medicine?  LISINOPRIL (lyse IN oh pril) is an ACE inhibitor. This medicine is used to treat high blood pressure and heart failure. It is also used to protect the heart immediately after a heart attack.  This medicine may be used for other purposes; ask your health care provider or pharmacist if you have questions.  COMMON BRAND NAME(S): Prinivil, Zestril  What should I tell my health care provider before I take this medicine?  They need to know if you have any of these conditions:  -diabetes  -heart or blood vessel disease  -kidney disease  -low blood pressure  -previous swelling of the tongue, face, or lips with difficulty breathing, difficulty swallowing, hoarseness, or tightening of the throat  -an unusual or allergic reaction to lisinopril, other ACE inhibitors, insect venom, foods, dyes, or preservatives  -pregnant or trying to get pregnant  -breast-feeding  How should I use this medicine?  Take this medicine by mouth with a glass of water. Follow the directions on your prescription label. You may take this medicine with or without food. If it upsets your stomach, take it with  food. Take your medicine at regular intervals. Do not take it more often than directed. Do not stop taking except on your doctor's advice.  Talk to your pediatrician regarding the use of this medicine in children. Special care may be needed. While this drug may be prescribed for children as young as 6 years of age for selected conditions, precautions do apply.  Overdosage: If you think you have taken too much of this medicine contact a poison control center or emergency room at once.  NOTE: This medicine is only for you. Do not share this medicine with others.  What if I miss a dose?  If you miss a dose, take it as soon as you can. If it is almost time for your next dose, take only that dose. Do not take double or extra doses.  What may interact with this medicine?  Do not take this medicine with any of the following medications:  -hymenoptera venom  -sacubitril; valsartan  This medicines may also interact with the following medications:  -aliskiren  -angiotensin receptor blockers, like losartan or valsartan  -certain medicines for diabetes  -diuretics  -everolimus  -gold compounds  -lithium  -NSAIDs, medicines for pain and inflammation, like ibuprofen or naproxen  -potassium salts or supplements  -salt substitutes  -sirolimus  -temsirolimus  This list may not describe all possible interactions. Give your health care provider a list of all the medicines, herbs, non-prescription drugs, or dietary supplements you use. Also tell them if you smoke, drink alcohol, or use illegal drugs. Some items may interact with your medicine.  What should I watch for while using this medicine?  Visit your doctor or health care professional for regular check ups. Check your blood pressure as directed. Ask your doctor what your blood pressure should be, and when you should contact him or her.  Do not treat yourself for coughs, colds, or pain while you are using this medicine without asking your doctor or health care professional for  advice. Some ingredients may increase your blood pressure.  Women should inform their doctor if they wish to become pregnant or think they might be pregnant. There is a potential for serious side effects to an unborn child. Talk to your health care professional or pharmacist for more information.  Check with your doctor or health care professional if you get an attack of severe diarrhea, nausea and vomiting, or if you sweat a lot. The loss of too much body fluid can make it dangerous for you to take this medicine.  You may get drowsy or dizzy. Do not drive, use machinery, or do anything that needs mental alertness until you know how this drug affects you. Do not stand or sit up quickly, especially if you are an older patient. This reduces the risk of dizzy or fainting spells. Alcohol can make you more drowsy and dizzy. Avoid alcoholic drinks.  Avoid salt substitutes unless you are told otherwise by your doctor or health care professional.  What side effects may I notice from receiving this medicine?  Side effects that you should report to your doctor or health care professional as soon as possible:  -allergic reactions like skin rash, itching or hives, swelling of the hands, feet, face, lips, throat, or tongue  -breathing problems  -signs and symptoms of kidney injury like trouble passing urine or change in the amount of urine  -signs and symptoms of increased potassium like muscle weakness; chest pain; or fast, irregular heartbeat  -signs and symptoms of liver injury like dark yellow or brown urine; general ill feeling or flu-like symptoms; light-colored stools; loss of appetite; nausea; right upper belly pain; unusually weak or tired; yellowing of the eyes or skin  -signs and symptoms of low blood pressure like dizziness; feeling faint or lightheaded, falls; unusually weak or tired  -stomach pain with or without nausea and vomiting  Side effects that usually do not require medical attention (report to your doctor  or health care professional if they continue or are bothersome):  -changes in taste  -cough  -dizziness  -fever  -headache  -sensitivity to light  This list may not describe all possible side effects. Call your doctor for medical advice about side effects. You may report side effects to FDA at 3-983-FDA-9537.  Where should I keep my medicine?  Keep out of the reach of children.  Store at room temperature between 15 and 30 degrees C (59 and 86 degrees F). Protect from moisture. Keep container tightly closed. Throw away any unused medicine after the expiration date.  NOTE: This sheet is a summary. It may not cover all possible information. If you have questions about this medicine, talk to your doctor, pharmacist, or health care provider.  © 2018 Elsevier/Gold Standard (2017-02-06 12:52:35)     Ergocalciferol, Vitamin D2 tablets or capsules  What is this medicine?  ERGOCALCIFEROL (er goe hollie SIF e role) is a man made form of vitamin D. It helps your body keep the right amount of calcium and phosphorus for healthy bones and teeth.  This medicine may be used for other purposes; ask your health care provider or pharmacist if you have questions.  COMMON BRAND NAME(S): Deltalin, Drisdol, Ergo D  What should I tell my health care provider before I take this medicine?  They need to know if you have any of the following conditions:  -kidney disease  -liver disease  -other chronic disease  -parathyroid disease  -stomach disease  -an unusual or allergic reaction to vitamin D, other medicines, foods, dyes, or preservatives  -pregnant or trying to get pregnant  -breast-feeding  How should I use this medicine?  Take this medicine by mouth with a glass of water. Follow the directions on the prescription label. Take your medicine at regular intervals. Do not take your medicine more often than directed.  Talk to your pediatrician regarding the use of this medicine in children. While this drug may be prescribed for children for  selected conditions, precautions do apply.  Overdosage: If you think you have taken too much of this medicine contact a poison control center or emergency room at once.  NOTE: This medicine is only for you. Do not share this medicine with others.  What if I miss a dose?  If you miss a dose, take it as soon as you can. If it is almost time for your next dose, take only that dose. Do not take double or extra doses.  What may interact with this medicine?  Do not take this medicine with any of the following medications:  -vitamin D  This medicine may also interact with the following medications:  -digoxin  -diuretics  -medicines for cholesterol like colestipol or cholestyramine  -medicines to treat seizures or nerve pain  -mineral oil  -orlistat  -some over-the-counter supplements  This list may not describe all possible interactions. Give your health care provider a list of all the medicines, herbs, non-prescription drugs, or dietary supplements you use. Also tell them if you smoke, drink alcohol, or use illegal drugs. Some items may interact with your medicine.  What should I watch for while using this medicine?  Visit your doctor or health care professional for regular checks on your progress.  Do not take any non-prescription medicines that have vitamin D, phosphorus, magnesium, or calcium including antacids while taking this medicine, unless your doctor or health care professional says you can. The extra supplements can cause side effects.  What side effects may I notice from receiving this medicine?  Side effects that you should report to your doctor or health care professional as soon as possible:  -allergic reactions like skin rash, itching or hives, swelling of the face, lips, or tongue  -bone pain  -increased thirst  -increased urination (especially at night)  -irregular heartbeat, high blood pressure  -seizures  -unexpected weight loss  -unusually weak or tired  Side effects that usually do not require  medical attention (report to your doctor or health care professional if they continue or are bothersome):  -constipation  -dry mouth  -headache  -loss of appetite  -metallic taste  -stomach upset  This list may not describe all possible side effects. Call your doctor for medical advice about side effects. You may report side effects to FDA at 2-725-FDA-8814.  Where should I keep my medicine?  Keep out of the reach of children.  Store at room temperature between 15 and 30 degrees C (59 and 86 degrees F). Protect from light. Keep container tightly closed. Throw away any unused medicine after the expiration date.  NOTE: This sheet is a summary. It may not cover all possible information. If you have questions about this medicine, talk to your doctor, pharmacist, or health care provider.  © 2018 Elsevier/Gold Standard (2009-04-20 17:48:44)    Depression / Suicide Risk    As you are discharged from this [facility] facility, it is important to learn how to keep safe from harming yourself.    Recognize the warning signs:  · Abrupt changes in personality, positive or negative- including increase in energy   · Giving away possessions  · Change in eating patterns- significant weight changes-  positive or negative  · Change in sleeping patterns- unable to sleep or sleeping all the time   · Unwillingness or inability to communicate  · Depression  · Unusual sadness, discouragement and loneliness  · Talk of wanting to die  · Neglect of personal appearance   · Rebelliousness- reckless behavior  · Withdrawal from people/activities they love  · Confusion- inability to concentrate     If you or a loved one observes any of these behaviors or has concerns about self-harm, here's what you can do:  · Talk about it- your feelings and reasons for harming yourself  · Remove any means that you might use to hurt yourself (examples: pills, rope, extension cords, firearm)  · Get professional help from the community (Mental Health, Substance  Abuse, psychological counseling)  · Do not be alone:Call your Safe Contact- someone whom you trust who will be there for you.  · Call your local CRISIS HOTLINE 401-4400 or 581-206-2939  · Call your local Children's Mobile Crisis Response Team [place] (255) 431-3898 or www.[employer]  · Call the toll free National Suicide Prevention Hotlines   · National Suicide Prevention Lifeline 597-735-ERTE (7466)  · [place] [employer] Line Network 800-SUICIDE (737-9899)      Discharge Instructions per Reva Velázquez M.D.    Follow up with neurology and primary care physician as outpatient     DIET: regular diet     ACTIVITY: as tolerated     DIAGNOSIS: hypophosphatemia, seizure, hypertension     Return to ER if symptoms worsen

## 2019-09-30 NOTE — PROGRESS NOTES
Patient discharged from hospital to home. Tele monitor and IV removed with no signs or symptoms of bleeding or infection. Discussed prescriptions, discharge education, and symptom management/signs of worsening symptoms with patient. Patient verbalized understanding. Patient aware of follow-up appointment. Patient stable and vitals are within normal limits. Transported with wife via car.

## 2019-10-01 LAB
BACTERIA BLD CULT: NORMAL
BACTERIA BLD CULT: NORMAL
SIGNIFICANT IND 70042: NORMAL
SIGNIFICANT IND 70042: NORMAL
SITE SITE: NORMAL
SITE SITE: NORMAL
SOURCE SOURCE: NORMAL
SOURCE SOURCE: NORMAL

## 2019-10-02 LAB
CALCIUM 24H UR-MCNC: 2.2 MG/DL
CALCIUM 24H UR-MRATE: 64 MG/D
CALCIUM/CREAT 24H UR: 45 MG/G (ref 20–240)
COLLECT DURATION TIME SPEC: ABNORMAL HRS
COLLECT DURATION TIME SPEC: NORMAL HRS
CREAT 24H UR-MCNC: 49 MG/DL
CREAT 24H UR-MRATE: 1421 MG/D (ref 800–2100)
METANEPH 24H UR-MCNC: 53 UG/L
METANEPH 24H UR-MRATE: 154 UG/D (ref 62–207)
METANEPH/CREAT 24H UR: 108 UG/G CRT (ref 0–300)
METANEPHS SERPL-SCNC: 0.37 NMOL/L (ref 0–0.49)
NORMETANEPHRINE 24H UR-MCNC: 100 UG/L
NORMETANEPHRINE 24H UR-MRATE: 290 UG/D (ref 125–510)
NORMETANEPHRINE SERPL-SCNC: 0.54 NMOL/L (ref 0–0.89)
NORMETANEPHRINE/CREAT 24H UR: 204 UG/G CRT (ref 0–400)
PHOSPHATE 24H UR-MCNC: 55 MG/DL
PHOSPHATE 24H UR-MRATE: 1595 MG/D (ref 400–1300)
PHOSPHATE/CREAT 24H UR: 1122 MG/G
SPECIMEN VOL ?TM UR: NORMAL ML
TOTAL VOLUME 1105: ABNORMAL ML

## 2019-10-04 LAB
ANNOTATION COMMENT IMP: ABNORMAL
COLLECT DURATION TIME SPEC: 24 HRS
CORTIS F 24H UR HPLC-MCNC: 118 UG/L
CORTIS F 24H UR-MRATE: 342.2 UG/D
CORTIS F/CREAT 24H UR: 240.82 UG/G CRT
CREAT 24H UR-MCNC: 49 MG/DL
CREAT 24H UR-MRATE: 1421 MG/D (ref 800–2100)
SPECIMEN VOL ?TM UR: 2900 ML

## 2019-11-14 ENCOUNTER — HOSPITAL ENCOUNTER (INPATIENT)
Facility: MEDICAL CENTER | Age: 64
LOS: 1 days | DRG: 642 | End: 2019-11-15
Attending: EMERGENCY MEDICINE | Admitting: INTERNAL MEDICINE
Payer: MEDICARE

## 2019-11-14 ENCOUNTER — OFFICE VISIT (OUTPATIENT)
Dept: URGENT CARE | Facility: CLINIC | Age: 64
End: 2019-11-14
Payer: MEDICARE

## 2019-11-14 VITALS
RESPIRATION RATE: 16 BRPM | DIASTOLIC BLOOD PRESSURE: 82 MMHG | SYSTOLIC BLOOD PRESSURE: 122 MMHG | TEMPERATURE: 96 F | HEART RATE: 58 BPM | OXYGEN SATURATION: 99 %

## 2019-11-14 DIAGNOSIS — R11.2 NAUSEA AND VOMITING, INTRACTABILITY OF VOMITING NOT SPECIFIED, UNSPECIFIED VOMITING TYPE: ICD-10-CM

## 2019-11-14 DIAGNOSIS — E87.8 ELECTROLYTE ABNORMALITY: ICD-10-CM

## 2019-11-14 DIAGNOSIS — E87.6 HYPOKALEMIA: ICD-10-CM

## 2019-11-14 DIAGNOSIS — R42 DIZZINESS: ICD-10-CM

## 2019-11-14 DIAGNOSIS — E83.39 HYPOPHOSPHATEMIA: ICD-10-CM

## 2019-11-14 LAB
ALBUMIN SERPL BCP-MCNC: 4.6 G/DL (ref 3.2–4.9)
ALBUMIN/GLOB SERPL: 1.6 G/DL
ALP SERPL-CCNC: 162 U/L (ref 30–99)
ALT SERPL-CCNC: 12 U/L (ref 2–50)
ANION GAP SERPL CALC-SCNC: 18 MMOL/L (ref 0–11.9)
AST SERPL-CCNC: 15 U/L (ref 12–45)
BASOPHILS # BLD AUTO: 0.2 % (ref 0–1.8)
BASOPHILS # BLD: 0.03 K/UL (ref 0–0.12)
BILIRUB SERPL-MCNC: 0.9 MG/DL (ref 0.1–1.5)
BUN SERPL-MCNC: 11 MG/DL (ref 8–22)
CALCIUM SERPL-MCNC: 9.8 MG/DL (ref 8.5–10.5)
CHLORIDE SERPL-SCNC: 104 MMOL/L (ref 96–112)
CO2 SERPL-SCNC: 19 MMOL/L (ref 20–33)
CREAT SERPL-MCNC: 1.02 MG/DL (ref 0.5–1.4)
EKG IMPRESSION: NORMAL
EOSINOPHIL # BLD AUTO: 0 K/UL (ref 0–0.51)
EOSINOPHIL NFR BLD: 0 % (ref 0–6.9)
ERYTHROCYTE [DISTWIDTH] IN BLOOD BY AUTOMATED COUNT: 46.9 FL (ref 35.9–50)
ETHANOL BLD-MCNC: 0 G/DL
GLOBULIN SER CALC-MCNC: 2.8 G/DL (ref 1.9–3.5)
GLUCOSE SERPL-MCNC: 180 MG/DL (ref 65–99)
HCT VFR BLD AUTO: 46.9 % (ref 42–52)
HGB BLD-MCNC: 16.5 G/DL (ref 14–18)
IMM GRANULOCYTES # BLD AUTO: 0.06 K/UL (ref 0–0.11)
IMM GRANULOCYTES NFR BLD AUTO: 0.4 % (ref 0–0.9)
LIPASE SERPL-CCNC: 19 U/L (ref 11–82)
LYMPHOCYTES # BLD AUTO: 0.98 K/UL (ref 1–4.8)
LYMPHOCYTES NFR BLD: 7.1 % (ref 22–41)
MAGNESIUM SERPL-MCNC: 1.6 MG/DL (ref 1.5–2.5)
MCH RBC QN AUTO: 33.3 PG (ref 27–33)
MCHC RBC AUTO-ENTMCNC: 35.2 G/DL (ref 33.7–35.3)
MCV RBC AUTO: 94.7 FL (ref 81.4–97.8)
MONOCYTES # BLD AUTO: 0.59 K/UL (ref 0–0.85)
MONOCYTES NFR BLD AUTO: 4.3 % (ref 0–13.4)
NEUTROPHILS # BLD AUTO: 12.09 K/UL (ref 1.82–7.42)
NEUTROPHILS NFR BLD: 88 % (ref 44–72)
NRBC # BLD AUTO: 0 K/UL
NRBC BLD-RTO: 0 /100 WBC
PHOSPHATE SERPL-MCNC: <1 MG/DL (ref 2.5–4.5)
PLATELET # BLD AUTO: 183 K/UL (ref 164–446)
PMV BLD AUTO: 9.7 FL (ref 9–12.9)
POTASSIUM SERPL-SCNC: 3.1 MMOL/L (ref 3.6–5.5)
PROT SERPL-MCNC: 7.4 G/DL (ref 6–8.2)
RBC # BLD AUTO: 4.95 M/UL (ref 4.7–6.1)
SODIUM SERPL-SCNC: 141 MMOL/L (ref 135–145)
WBC # BLD AUTO: 13.8 K/UL (ref 4.8–10.8)

## 2019-11-14 PROCEDURE — 700111 HCHG RX REV CODE 636 W/ 250 OVERRIDE (IP): Performed by: STUDENT IN AN ORGANIZED HEALTH CARE EDUCATION/TRAINING PROGRAM

## 2019-11-14 PROCEDURE — 80307 DRUG TEST PRSMV CHEM ANLYZR: CPT

## 2019-11-14 PROCEDURE — 770020 HCHG ROOM/CARE - TELE (206)

## 2019-11-14 PROCEDURE — 700102 HCHG RX REV CODE 250 W/ 637 OVERRIDE(OP): Performed by: STUDENT IN AN ORGANIZED HEALTH CARE EDUCATION/TRAINING PROGRAM

## 2019-11-14 PROCEDURE — 700105 HCHG RX REV CODE 258: Performed by: EMERGENCY MEDICINE

## 2019-11-14 PROCEDURE — 96375 TX/PRO/DX INJ NEW DRUG ADDON: CPT

## 2019-11-14 PROCEDURE — 84100 ASSAY OF PHOSPHORUS: CPT

## 2019-11-14 PROCEDURE — 83690 ASSAY OF LIPASE: CPT

## 2019-11-14 PROCEDURE — 99285 EMERGENCY DEPT VISIT HI MDM: CPT

## 2019-11-14 PROCEDURE — 700101 HCHG RX REV CODE 250: Performed by: EMERGENCY MEDICINE

## 2019-11-14 PROCEDURE — A9270 NON-COVERED ITEM OR SERVICE: HCPCS | Performed by: STUDENT IN AN ORGANIZED HEALTH CARE EDUCATION/TRAINING PROGRAM

## 2019-11-14 PROCEDURE — 99214 OFFICE O/P EST MOD 30 MIN: CPT | Performed by: PHYSICIAN ASSISTANT

## 2019-11-14 PROCEDURE — 93005 ELECTROCARDIOGRAM TRACING: CPT | Performed by: STUDENT IN AN ORGANIZED HEALTH CARE EDUCATION/TRAINING PROGRAM

## 2019-11-14 PROCEDURE — 80053 COMPREHEN METABOLIC PANEL: CPT

## 2019-11-14 PROCEDURE — 85025 COMPLETE CBC W/AUTO DIFF WBC: CPT

## 2019-11-14 PROCEDURE — 96366 THER/PROPH/DIAG IV INF ADDON: CPT

## 2019-11-14 PROCEDURE — 96365 THER/PROPH/DIAG IV INF INIT: CPT

## 2019-11-14 PROCEDURE — 83735 ASSAY OF MAGNESIUM: CPT

## 2019-11-14 PROCEDURE — 94760 N-INVAS EAR/PLS OXIMETRY 1: CPT

## 2019-11-14 RX ORDER — ESCITALOPRAM OXALATE 10 MG/1
20 TABLET ORAL EVERY EVENING
Status: DISCONTINUED | OUTPATIENT
Start: 2019-11-14 | End: 2019-11-15 | Stop reason: HOSPADM

## 2019-11-14 RX ORDER — CARBAMAZEPINE 200 MG/1
400 TABLET ORAL EVERY EVENING
Status: ON HOLD | COMMUNITY
End: 2023-01-01

## 2019-11-14 RX ORDER — PROCHLORPERAZINE EDISYLATE 5 MG/ML
10 INJECTION INTRAMUSCULAR; INTRAVENOUS ONCE
Status: COMPLETED | OUTPATIENT
Start: 2019-11-14 | End: 2019-11-14

## 2019-11-14 RX ORDER — SODIUM CHLORIDE 9 MG/ML
INJECTION, SOLUTION INTRAVENOUS CONTINUOUS
Status: DISCONTINUED | OUTPATIENT
Start: 2019-11-14 | End: 2019-11-15 | Stop reason: HOSPADM

## 2019-11-14 RX ORDER — ACETAMINOPHEN 325 MG/1
650 TABLET ORAL EVERY 6 HOURS PRN
Status: DISCONTINUED | OUTPATIENT
Start: 2019-11-14 | End: 2019-11-15 | Stop reason: HOSPADM

## 2019-11-14 RX ORDER — QUETIAPINE FUMARATE 25 MG/1
25 TABLET, FILM COATED ORAL EVERY EVENING
Status: DISCONTINUED | OUTPATIENT
Start: 2019-11-14 | End: 2019-11-15 | Stop reason: HOSPADM

## 2019-11-14 RX ORDER — POLYETHYLENE GLYCOL 3350 17 G/17G
1 POWDER, FOR SOLUTION ORAL
Status: DISCONTINUED | OUTPATIENT
Start: 2019-11-14 | End: 2019-11-15

## 2019-11-14 RX ORDER — CARBAMAZEPINE 200 MG/1
200 TABLET ORAL EVERY EVENING
Status: DISCONTINUED | OUTPATIENT
Start: 2019-11-14 | End: 2019-11-15 | Stop reason: HOSPADM

## 2019-11-14 RX ORDER — ATORVASTATIN CALCIUM 40 MG/1
40 TABLET, FILM COATED ORAL NIGHTLY
Status: DISCONTINUED | OUTPATIENT
Start: 2019-11-14 | End: 2019-11-15 | Stop reason: HOSPADM

## 2019-11-14 RX ORDER — LORAZEPAM 2 MG/ML
1 INJECTION INTRAMUSCULAR ONCE
Status: DISCONTINUED | OUTPATIENT
Start: 2019-11-14 | End: 2019-11-15 | Stop reason: HOSPADM

## 2019-11-14 RX ORDER — MAGNESIUM SULFATE HEPTAHYDRATE 40 MG/ML
4 INJECTION, SOLUTION INTRAVENOUS ONCE
Status: COMPLETED | OUTPATIENT
Start: 2019-11-14 | End: 2019-11-15

## 2019-11-14 RX ORDER — AMOXICILLIN 250 MG
2 CAPSULE ORAL 2 TIMES DAILY
Status: DISCONTINUED | OUTPATIENT
Start: 2019-11-14 | End: 2019-11-15

## 2019-11-14 RX ORDER — BISACODYL 10 MG
10 SUPPOSITORY, RECTAL RECTAL
Status: DISCONTINUED | OUTPATIENT
Start: 2019-11-14 | End: 2019-11-15

## 2019-11-14 RX ORDER — LABETALOL HYDROCHLORIDE 5 MG/ML
10 INJECTION, SOLUTION INTRAVENOUS EVERY 4 HOURS PRN
Status: DISCONTINUED | OUTPATIENT
Start: 2019-11-14 | End: 2019-11-15 | Stop reason: HOSPADM

## 2019-11-14 RX ADMIN — ESCITALOPRAM OXALATE 20 MG: 10 TABLET ORAL at 23:29

## 2019-11-14 RX ADMIN — QUETIAPINE FUMARATE 25 MG: 25 TABLET ORAL at 23:29

## 2019-11-14 RX ADMIN — CARBAMAZEPINE 200 MG: 200 TABLET ORAL at 23:28

## 2019-11-14 RX ADMIN — PROCHLORPERAZINE EDISYLATE 10 MG: 5 INJECTION INTRAMUSCULAR; INTRAVENOUS at 23:06

## 2019-11-14 RX ADMIN — POTASSIUM PHOSPHATE, MONOBASIC AND POTASSIUM PHOSPHATE, DIBASIC 30 MMOL: 224; 236 INJECTION, SOLUTION, CONCENTRATE INTRAVENOUS at 18:30

## 2019-11-14 ASSESSMENT — COGNITIVE AND FUNCTIONAL STATUS - GENERAL
SUGGESTED CMS G CODE MODIFIER DAILY ACTIVITY: CH
DAILY ACTIVITIY SCORE: 24
SUGGESTED CMS G CODE MODIFIER MOBILITY: CH
MOBILITY SCORE: 24

## 2019-11-14 ASSESSMENT — LIFESTYLE VARIABLES
CONSUMPTION TOTAL: NEGATIVE
ALCOHOL_USE: NO
HAVE PEOPLE ANNOYED YOU BY CRITICIZING YOUR DRINKING: NO
TOTAL SCORE: 0
DOES PATIENT WANT TO STOP DRINKING: NO
DO YOU DRINK ALCOHOL: NO
AVERAGE NUMBER OF DAYS PER WEEK YOU HAVE A DRINK CONTAINING ALCOHOL: 0
EVER FELT BAD OR GUILTY ABOUT YOUR DRINKING: NO
TOTAL SCORE: 0
EVER HAD A DRINK FIRST THING IN THE MORNING TO STEADY YOUR NERVES TO GET RID OF A HANGOVER: NO
ON A TYPICAL DAY WHEN YOU DRINK ALCOHOL HOW MANY DRINKS DO YOU HAVE: 0
HOW MANY TIMES IN THE PAST YEAR HAVE YOU HAD 5 OR MORE DRINKS IN A DAY: 0
TOTAL SCORE: 0
EVER_SMOKED: YES
HAVE YOU EVER FELT YOU SHOULD CUT DOWN ON YOUR DRINKING: NO

## 2019-11-14 ASSESSMENT — PATIENT HEALTH QUESTIONNAIRE - PHQ9
1. LITTLE INTEREST OR PLEASURE IN DOING THINGS: NOT AT ALL
SUM OF ALL RESPONSES TO PHQ9 QUESTIONS 1 AND 2: 0
2. FEELING DOWN, DEPRESSED, IRRITABLE, OR HOPELESS: NOT AT ALL

## 2019-11-15 ENCOUNTER — PATIENT OUTREACH (OUTPATIENT)
Dept: HEALTH INFORMATION MANAGEMENT | Facility: OTHER | Age: 64
End: 2019-11-15

## 2019-11-15 VITALS
BODY MASS INDEX: 20.99 KG/M2 | TEMPERATURE: 98.6 F | OXYGEN SATURATION: 97 % | HEIGHT: 72 IN | DIASTOLIC BLOOD PRESSURE: 88 MMHG | RESPIRATION RATE: 16 BRPM | SYSTOLIC BLOOD PRESSURE: 161 MMHG | HEART RATE: 68 BPM | WEIGHT: 154.98 LBS

## 2019-11-15 PROBLEM — E83.42 HYPOMAGNESEMIA: Status: ACTIVE | Noted: 2019-11-15

## 2019-11-15 PROBLEM — R94.31 QT PROLONGATION: Status: ACTIVE | Noted: 2019-11-15

## 2019-11-15 LAB
ALBUMIN SERPL BCP-MCNC: 4.6 G/DL (ref 3.2–4.9)
ALBUMIN/GLOB SERPL: 2 G/DL
ALP SERPL-CCNC: 146 U/L (ref 30–99)
ALT SERPL-CCNC: 12 U/L (ref 2–50)
ANION GAP SERPL CALC-SCNC: 16 MMOL/L (ref 0–11.9)
ANION GAP SERPL CALC-SCNC: 9 MMOL/L (ref 0–11.9)
AST SERPL-CCNC: 15 U/L (ref 12–45)
BASOPHILS # BLD AUTO: 0.1 % (ref 0–1.8)
BASOPHILS # BLD: 0.01 K/UL (ref 0–0.12)
BILIRUB SERPL-MCNC: 0.8 MG/DL (ref 0.1–1.5)
BUN SERPL-MCNC: 10 MG/DL (ref 8–22)
BUN SERPL-MCNC: 10 MG/DL (ref 8–22)
CALCIUM SERPL-MCNC: 9.1 MG/DL (ref 8.5–10.5)
CALCIUM SERPL-MCNC: 9.2 MG/DL (ref 8.5–10.5)
CHLORIDE SERPL-SCNC: 104 MMOL/L (ref 96–112)
CHLORIDE SERPL-SCNC: 107 MMOL/L (ref 96–112)
CK SERPL-CCNC: 76 U/L (ref 0–154)
CO2 SERPL-SCNC: 19 MMOL/L (ref 20–33)
CO2 SERPL-SCNC: 24 MMOL/L (ref 20–33)
CREAT SERPL-MCNC: 0.86 MG/DL (ref 0.5–1.4)
CREAT SERPL-MCNC: 0.93 MG/DL (ref 0.5–1.4)
EOSINOPHIL # BLD AUTO: 0 K/UL (ref 0–0.51)
EOSINOPHIL NFR BLD: 0 % (ref 0–6.9)
ERYTHROCYTE [DISTWIDTH] IN BLOOD BY AUTOMATED COUNT: 46.3 FL (ref 35.9–50)
GLOBULIN SER CALC-MCNC: 2.3 G/DL (ref 1.9–3.5)
GLUCOSE SERPL-MCNC: 134 MG/DL (ref 65–99)
GLUCOSE SERPL-MCNC: 135 MG/DL (ref 65–99)
HCT VFR BLD AUTO: 46.6 % (ref 42–52)
HGB BLD-MCNC: 16.3 G/DL (ref 14–18)
IMM GRANULOCYTES # BLD AUTO: 0.04 K/UL (ref 0–0.11)
IMM GRANULOCYTES NFR BLD AUTO: 0.4 % (ref 0–0.9)
LYMPHOCYTES # BLD AUTO: 1.18 K/UL (ref 1–4.8)
LYMPHOCYTES NFR BLD: 11.3 % (ref 22–41)
MCH RBC QN AUTO: 32.7 PG (ref 27–33)
MCHC RBC AUTO-ENTMCNC: 35 G/DL (ref 33.7–35.3)
MCV RBC AUTO: 93.6 FL (ref 81.4–97.8)
MONOCYTES # BLD AUTO: 0.83 K/UL (ref 0–0.85)
MONOCYTES NFR BLD AUTO: 8 % (ref 0–13.4)
NEUTROPHILS # BLD AUTO: 8.36 K/UL (ref 1.82–7.42)
NEUTROPHILS NFR BLD: 80.2 % (ref 44–72)
NRBC # BLD AUTO: 0 K/UL
NRBC BLD-RTO: 0 /100 WBC
PHOSPHATE SERPL-MCNC: 3 MG/DL (ref 2.5–4.5)
PHOSPHATE SERPL-MCNC: 5.1 MG/DL (ref 2.5–4.5)
PLATELET # BLD AUTO: 182 K/UL (ref 164–446)
PMV BLD AUTO: 9.3 FL (ref 9–12.9)
POTASSIUM SERPL-SCNC: 3 MMOL/L (ref 3.6–5.5)
POTASSIUM SERPL-SCNC: 3.6 MMOL/L (ref 3.6–5.5)
PROT SERPL-MCNC: 6.9 G/DL (ref 6–8.2)
RBC # BLD AUTO: 4.98 M/UL (ref 4.7–6.1)
SODIUM SERPL-SCNC: 139 MMOL/L (ref 135–145)
SODIUM SERPL-SCNC: 140 MMOL/L (ref 135–145)
WBC # BLD AUTO: 10.4 K/UL (ref 4.8–10.8)

## 2019-11-15 PROCEDURE — 80048 BASIC METABOLIC PNL TOTAL CA: CPT

## 2019-11-15 PROCEDURE — 84100 ASSAY OF PHOSPHORUS: CPT

## 2019-11-15 PROCEDURE — 99223 1ST HOSP IP/OBS HIGH 75: CPT | Mod: GC | Performed by: INTERNAL MEDICINE

## 2019-11-15 PROCEDURE — A9270 NON-COVERED ITEM OR SERVICE: HCPCS | Performed by: STUDENT IN AN ORGANIZED HEALTH CARE EDUCATION/TRAINING PROGRAM

## 2019-11-15 PROCEDURE — 700102 HCHG RX REV CODE 250 W/ 637 OVERRIDE(OP): Performed by: STUDENT IN AN ORGANIZED HEALTH CARE EDUCATION/TRAINING PROGRAM

## 2019-11-15 PROCEDURE — 82550 ASSAY OF CK (CPK): CPT

## 2019-11-15 PROCEDURE — 700111 HCHG RX REV CODE 636 W/ 250 OVERRIDE (IP): Performed by: STUDENT IN AN ORGANIZED HEALTH CARE EDUCATION/TRAINING PROGRAM

## 2019-11-15 PROCEDURE — 84155 ASSAY OF PROTEIN SERUM: CPT

## 2019-11-15 PROCEDURE — 80053 COMPREHEN METABOLIC PANEL: CPT

## 2019-11-15 PROCEDURE — 83655 ASSAY OF LEAD: CPT

## 2019-11-15 PROCEDURE — 36415 COLL VENOUS BLD VENIPUNCTURE: CPT

## 2019-11-15 PROCEDURE — 700101 HCHG RX REV CODE 250: Performed by: STUDENT IN AN ORGANIZED HEALTH CARE EDUCATION/TRAINING PROGRAM

## 2019-11-15 PROCEDURE — 85025 COMPLETE CBC W/AUTO DIFF WBC: CPT

## 2019-11-15 PROCEDURE — 700105 HCHG RX REV CODE 258: Performed by: STUDENT IN AN ORGANIZED HEALTH CARE EDUCATION/TRAINING PROGRAM

## 2019-11-15 PROCEDURE — 84165 PROTEIN E-PHORESIS SERUM: CPT

## 2019-11-15 RX ORDER — SODIUM PHOSPHATE, DIBASIC, ANHYDROUS, POTASSIUM PHOSPHATE, MONOBASIC, AND SODIUM PHOSPHATE, MONOBASIC, MONOHYDRATE 852; 155; 130 MG/1; MG/1; MG/1
1-2 TABLET, COATED ORAL
Qty: 120 TAB | Refills: 0 | Status: SHIPPED | OUTPATIENT
Start: 2019-11-15 | End: 2020-07-08

## 2019-11-15 RX ORDER — POLYETHYLENE GLYCOL 3350 17 G/17G
1 POWDER, FOR SOLUTION ORAL
Status: DISCONTINUED | OUTPATIENT
Start: 2019-11-15 | End: 2019-11-15 | Stop reason: HOSPADM

## 2019-11-15 RX ORDER — AMOXICILLIN 250 MG
2 CAPSULE ORAL 2 TIMES DAILY PRN
Status: DISCONTINUED | OUTPATIENT
Start: 2019-11-15 | End: 2019-11-15 | Stop reason: HOSPADM

## 2019-11-15 RX ORDER — POTASSIUM CHLORIDE 20 MEQ/1
40 TABLET, EXTENDED RELEASE ORAL ONCE
Status: COMPLETED | OUTPATIENT
Start: 2019-11-15 | End: 2019-11-15

## 2019-11-15 RX ORDER — POTASSIUM CHLORIDE 20 MEQ/1
40 TABLET, EXTENDED RELEASE ORAL 3 TIMES DAILY
Status: DISCONTINUED | OUTPATIENT
Start: 2019-11-15 | End: 2019-11-15

## 2019-11-15 RX ORDER — BISACODYL 10 MG
10 SUPPOSITORY, RECTAL RECTAL
Status: DISCONTINUED | OUTPATIENT
Start: 2019-11-15 | End: 2019-11-15 | Stop reason: HOSPADM

## 2019-11-15 RX ADMIN — LABETALOL HYDROCHLORIDE 10 MG: 5 INJECTION INTRAVENOUS at 03:32

## 2019-11-15 RX ADMIN — POTASSIUM CHLORIDE 40 MEQ: 1500 TABLET, EXTENDED RELEASE ORAL at 10:39

## 2019-11-15 RX ADMIN — MAGNESIUM SULFATE IN WATER 4 G: 40 INJECTION, SOLUTION INTRAVENOUS at 00:22

## 2019-11-15 RX ADMIN — POTASSIUM CHLORIDE 40 MEQ: 1500 TABLET, EXTENDED RELEASE ORAL at 16:08

## 2019-11-15 RX ADMIN — SODIUM CHLORIDE: 9 INJECTION, SOLUTION INTRAVENOUS at 01:18

## 2019-11-15 RX ADMIN — ENOXAPARIN SODIUM 40 MG: 100 INJECTION SUBCUTANEOUS at 04:39

## 2019-11-15 RX ADMIN — POTASSIUM CHLORIDE 40 MEQ: 1500 TABLET, EXTENDED RELEASE ORAL at 09:02

## 2019-11-15 ASSESSMENT — ENCOUNTER SYMPTOMS
HEARTBURN: 0
WEAKNESS: 1
PALPITATIONS: 0
MYALGIAS: 0
SHORTNESS OF BREATH: 0
WEAKNESS: 1
NAUSEA: 1
ABDOMINAL PAIN: 1
VOMITING: 1
BLURRED VISION: 0
FEVER: 0
VOMITING: 0
SPUTUM PRODUCTION: 0
CONSTIPATION: 0
PHOTOPHOBIA: 0
ABDOMINAL PAIN: 1
DIZZINESS: 1
WEIGHT LOSS: 0
CHILLS: 1
HEADACHES: 1
WEAKNESS: 0
DIARRHEA: 0
NAUSEA: 0
DOUBLE VISION: 0
DIZZINESS: 0
COUGH: 0
CHILLS: 0
VOMITING: 1
FEVER: 1
ABDOMINAL PAIN: 0
HEADACHES: 0
NECK PAIN: 0
DEPRESSION: 0
CHILLS: 1
NAUSEA: 1
HEMOPTYSIS: 0

## 2019-11-15 ASSESSMENT — PATIENT HEALTH QUESTIONNAIRE - PHQ9
SUM OF ALL RESPONSES TO PHQ9 QUESTIONS 1 AND 2: 0
1. LITTLE INTEREST OR PLEASURE IN DOING THINGS: NOT AT ALL
2. FEELING DOWN, DEPRESSED, IRRITABLE, OR HOPELESS: NOT AT ALL

## 2019-11-15 NOTE — ED NOTES
Medication reconciliation has been completed by interviewing patient with rx bottles and consent to do so with family/visitors in the room. Reviewed rx bottles with pt and returned to pt family at bedside  Allergies were reviewed   No ORAL antibiotics within the past 14 days  [facility]

## 2019-11-15 NOTE — ASSESSMENT & PLAN NOTE
-symptoms improved following repletion of phosphat    -monitor and replete  -SPEP, lead level pending  -urine lytes deferred as patient wanted to be discharged

## 2019-11-15 NOTE — SENIOR ADMIT NOTE
SENIOR ADMIT NOTE:    Serena Godinez M.D.  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 The patient states that he has been having a low phos for a couple of months now. He knows when his phosphate is low because he feels generally weak and nauseated. The patient was feeling this way today.     In the ED, he had a critical phosphate of <1. He also has a low potassium. Bicarb was slightly low at 19. His calcium was normal. His albumin was normal as well. He denies any starvation/refeeding type of issues. However, the patient states that he has been nauseated and vomiting al day and is unable to even keep down water.     He has been admitted twice in the last 2 months for the same reason. He had a workup which included a low vitamin D at 20 (he denies taking any supplementation at home), a normal PTH, and a normal calcium. He did have a large amount of phosphate being excreted from the urine at 1595 mg/d. He also had a high urine cortisol.        Physical Exam:  Weight/BMI: Body mass index is 21.02 kg/m².  /80   Pulse 63   Temp 36.2 °C (97.2 °F) (Temporal)   Resp 18   Ht 1.829 m (6')   Wt 70.3 kg (154 lb 15.7 oz)   SpO2 95%   Vitals:    11/14/19 2100 11/14/19 2241 11/14/19 2302 11/14/19 2332   BP: (!) 173/92 (!) 175/98 (!) 172/101 135/80   Pulse: (!) 53 60 (!) 55 63   Resp:       Temp:       TempSrc:       SpO2: 97% 98% 97% 95%   Weight:       Height:         Oxygen Therapy:  Pulse Oximetry: 95 %, O2 Delivery: None (Room Air)    Physical exam:  Constitutional:  No acute distress. A&O x3  HENMT:  Normocephalic, Atraumatic  Eyes:  PERRLA, EOMI, Conjunctiva normal  Neck:  Supple, Full range of  motion, No stridor  Cardiovascular:  Regular rate and rhythm, No murmurs, No rubs, No gallops.   Lungs: Respiratory effort is normal, no crackles, no wheezing.  Extremities: Good pedal pulses b/l, no edema, 5/5 strength.  Abdomen: Bowel sounds x4, Soft, Non-tender, Non-distended, No guarding, No rebound, No masses.  Neurologic: Good sensations, Cranial nerves II through XII grossly intact. No focal deficits noted.    Imaging  No orders to display     ASSESSMENT/PLAN:  # Severe Hypophosphatemia  - There are two main systems-based reasons that I can think of as to why the patient may be having low levels of phosphorous. The first is an issue with absorption through the GI system, the second is too much excretion through the kidneys. The patient was nauseated and vomiting but denies any diarrhea or other symptoms. He is not having any kind of re-feeding syndrome since he is not grossly malnourished and has not been starving. In terms of kidney excretion, at the last admission it was shown that he is excreting a large amount of phosphorous out of his urine. I am questioning whether this could possibly be related to fanconi's syndrome or if the patient has low vitamin D levels and as a consequence, secondary hyperparathyroidism. Some drugs can induce this but the patient is not on any of those medications (no antacids). He is on AEDs, but that was started after he had a seizure during his last admission and he presented with low phosphate levels. The patient did have a low vitamin D in September at 20. He is not taking supplementation for this. His PTH is normal making secondary hyperparathyroidism less likely.   - If this is fanconi's syndrome, there are many causes of this. I don't believe that this is congenital since the patient has not had this issue before, only recently. Fanconi's syndrome could be caused secondary to MGUS vs MM (no CRAB features, so more likely MGUS of the two). It could also be due to lead  poisoning (patient is also nauseated and vomiting with fatigue). However, fanconi's syndrome typically has other renal losses as well including potassium, bicarbonate, glucose, uric acid, and proteins and he actually has a low urine potassium and no urine glucose.   - The first step would be to replace the phos. He received 30 mmol Kphos in the ED. We will re-check and then replete orally depending on the level.   - Order SPEP  - Order Lead level  - Re-order vitamin D  - Re-order PTH  - re-order urine electryolytes  - at phosphate levels this low, sometimes there is a concern of rhabdomyolysis, but the patient is not having any signs/symptoms and kidney function is normal. However, I will order a CPK anyways.   - would consider nephrology consult as well to see why the patient is having such low phosphorous levels and was clearly excreting more-than-normal based off of last admission results.   - Can consider dipyridamole which enhances renal absorption of phosphorous, but again I would consult with nephrology before making this decision.      For full details please refer to H&P done by Dr. Haris Godinez M.D.

## 2019-11-15 NOTE — ED TRIAGE NOTES
"Pt c/o n/v and shaking. Pt has been admitted x2 for critically low phosphorus. Pt states \"feels the same.\" pt vomiting in triage.   "

## 2019-11-15 NOTE — CARE PLAN
Problem: Safety  Goal: Will remain free from falls  Outcome: PROGRESSING AS EXPECTED  Intervention: Assess risk factors for falls  Flowsheets (Taken 11/15/2019 0424)  Risk for Injury-Any positive answers results in the pt being at high risk for fall related injury: Not Applicable     Problem: Infection  Goal: Will remain free from infection  Outcome: PROGRESSING AS EXPECTED  Intervention: Implement standard precautions and perform hand washing before and after patient contact  Note:   Hand hygiene performed before and after all patient care.

## 2019-11-15 NOTE — ED PROVIDER NOTES
ED Provider Note    CHIEF COMPLAINT  Chief Complaint   Patient presents with   • N/V   • Shaking       HPI  Roby Viramontes II is a 63 y.o. male who presents for evaluation of tremors not feeling well shaking.  Patient apparently has a seizure disorder.  He denies recent seizures.  He was recently admitted to this facility for similar symptoms and he had critically low phosphorus and was admitted and after phosphorus repletion felt better.  He reports feeling similar with some mild generalized weakness shakiness but no active seizures.  No abdominal pain fevers chills or any other symptoms..  No numbness weakness or tingling    REVIEW OF SYSTEMS  See HPI for further details.  No night sweats weight loss all other systems are negative.     PAST MEDICAL HISTORY  No past medical history on file.  Seizure disorder  FAMILY HISTORY  Noncontributory    SOCIAL HISTORY  Social History     Socioeconomic History   • Marital status:      Spouse name: Not on file   • Number of children: Not on file   • Years of education: Not on file   • Highest education level: Not on file   Occupational History   • Not on file   Social Needs   • Financial resource strain: Not on file   • Food insecurity:     Worry: Not on file     Inability: Not on file   • Transportation needs:     Medical: Not on file     Non-medical: Not on file   Tobacco Use   • Smoking status: Former Smoker     Types: Cigarettes   • Smokeless tobacco: Never Used   Substance and Sexual Activity   • Alcohol use: Not Currently   • Drug use: Yes     Types: Marijuana, Inhaled     Comment: marijuana   • Sexual activity: Not on file   Lifestyle   • Physical activity:     Days per week: Not on file     Minutes per session: Not on file   • Stress: Not on file   Relationships   • Social connections:     Talks on phone: Not on file     Gets together: Not on file     Attends Oriental orthodox service: Not on file     Active member of club or organization: Not on file     Attends  meetings of clubs or organizations: Not on file     Relationship status: Not on file   • Intimate partner violence:     Fear of current or ex partner: Not on file     Emotionally abused: Not on file     Physically abused: Not on file     Forced sexual activity: Not on file   Other Topics Concern   • Not on file   Social History Narrative   • Not on file       SURGICAL HISTORY  No past surgical history on file.    CURRENT MEDICATIONS  Home Medications    **Home medications have not yet been reviewed for this encounter**         ALLERGIES  No Known Allergies    PHYSICAL EXAM  VITAL SIGNS: BP (!) 165/89   Pulse (!) 57   Temp 36.2 °C (97.2 °F) (Temporal)   Resp 18   Ht 1.829 m (6')   Wt 70.3 kg (154 lb 15.7 oz)   SpO2 100%   BMI 21.02 kg/m²  Room air O2: 100    Constitutional: Well developed, Well nourished, No acute distress, Non-toxic appearance.   HENT: Normocephalic, Atraumatic, Bilateral external ears normal, Oropharynx moist, No oral exudates, Nose normal.   Eyes: PERRLA, EOMI, Conjunctiva normal, No discharge.   Neck: Normal range of motion, No tenderness, Supple, No stridor.   Cardiovascular: Normal heart rate, Normal rhythm, No murmurs, No rubs, No gallops.   Thorax & Lungs: Normal breath sounds, No respiratory distress, No wheezing, No chest tenderness.   Abdomen: Bowel sounds normal, Soft, No tenderness, No masses, No pulsatile masses.   Skin: Warm, Dry, No erythema, No rash.   Back: No tenderness, No CVA tenderness.   Extremities: Intact distal pulses, No edema, No tenderness, No cyanosis, No clubbing.   Musculoskeletal: Good range of motion in all major joints. No tenderness to palpation or major deformities noted.   Neurologic: Alert & oriented x 3, Normal motor function, Normal sensory function, No focal deficits noted.  Mild tremor  Psychiatric: Anxious mild tremor    Results for orders placed or performed during the hospital encounter of 11/14/19   MAGNESIUM   Result Value Ref Range     Magnesium 1.6 1.5 - 2.5 mg/dL   PHOSPHORUS   Result Value Ref Range    Phosphorus <1.0 (LL) 2.5 - 4.5 mg/dL   CBC WITH DIFFERENTIAL   Result Value Ref Range    WBC 13.8 (H) 4.8 - 10.8 K/uL    RBC 4.95 4.70 - 6.10 M/uL    Hemoglobin 16.5 14.0 - 18.0 g/dL    Hematocrit 46.9 42.0 - 52.0 %    MCV 94.7 81.4 - 97.8 fL    MCH 33.3 (H) 27.0 - 33.0 pg    MCHC 35.2 33.7 - 35.3 g/dL    RDW 46.9 35.9 - 50.0 fL    Platelet Count 183 164 - 446 K/uL    MPV 9.7 9.0 - 12.9 fL    Neutrophils-Polys 88.00 (H) 44.00 - 72.00 %    Lymphocytes 7.10 (L) 22.00 - 41.00 %    Monocytes 4.30 0.00 - 13.40 %    Eosinophils 0.00 0.00 - 6.90 %    Basophils 0.20 0.00 - 1.80 %    Immature Granulocytes 0.40 0.00 - 0.90 %    Nucleated RBC 0.00 /100 WBC    Neutrophils (Absolute) 12.09 (H) 1.82 - 7.42 K/uL    Lymphs (Absolute) 0.98 (L) 1.00 - 4.80 K/uL    Monos (Absolute) 0.59 0.00 - 0.85 K/uL    Eos (Absolute) 0.00 0.00 - 0.51 K/uL    Baso (Absolute) 0.03 0.00 - 0.12 K/uL    Immature Granulocytes (abs) 0.06 0.00 - 0.11 K/uL    NRBC (Absolute) 0.00 K/uL   Comp Metabolic Panel   Result Value Ref Range    Sodium 141 135 - 145 mmol/L    Potassium 3.1 (L) 3.6 - 5.5 mmol/L    Chloride 104 96 - 112 mmol/L    Co2 19 (L) 20 - 33 mmol/L    Anion Gap 18.0 (H) 0.0 - 11.9    Glucose 180 (H) 65 - 99 mg/dL    Bun 11 8 - 22 mg/dL    Creatinine 1.02 0.50 - 1.40 mg/dL    Calcium 9.8 8.5 - 10.5 mg/dL    AST(SGOT) 15 12 - 45 U/L    ALT(SGPT) 12 2 - 50 U/L    Alkaline Phosphatase 162 (H) 30 - 99 U/L    Total Bilirubin 0.9 0.1 - 1.5 mg/dL    Albumin 4.6 3.2 - 4.9 g/dL    Total Protein 7.4 6.0 - 8.2 g/dL    Globulin 2.8 1.9 - 3.5 g/dL    A-G Ratio 1.6 g/dL   LIPASE   Result Value Ref Range    Lipase 19 11 - 82 U/L   DIAGNOSTIC ALCOHOL   Result Value Ref Range    Diagnostic Alcohol 0.00 0.00 g/dL   ESTIMATED GFR   Result Value Ref Range    GFR If African American >60 >60 mL/min/1.73 m 2    GFR If Non African American >60 >60 mL/min/1.73 m 2        COURSE & MEDICAL DECISION  MAKING  Pertinent Labs & Imaging studies reviewed. (See chart for details)  An IV was established.  I reviewed the patient's records and he indeed has a history of critical hypophosphatemia.  Here his phosphorus is critically low at less than 1.0.  He will be admitted for additional electrolyte work-up, phosphorus repletion.  He was given a dose of K-Phos here and will be admitted to internal medicine    FINAL IMPRESSION  1.  Critical hypophosphatemia    Admission         Electronically signed by: Destin Rodriguez, 11/14/2019 6:12 PM

## 2019-11-15 NOTE — PROGRESS NOTES
Subjective:   Roby Viramontes II is a 63 y.o. male who presents for Emesis (x today, vomiting, dizziness, chill)        This is a new problem.  Patient complains of vomiting, nausea, dizziness, chills, abdominal pain.  Symptoms began suddenly after a run today.  Patient states that he had identical symptoms several weeks ago and was subsequently hospitalized due to low phosphate.  No aggravating or alleviating factors pain is worse with movement and patient is having a difficult time ambulating due to discomfort.  No other aggravating or alleviating factors.  He is not taking any medications for symptoms.  Past medical history significant for essential hypertension and hyperlipidemia.  He is not currently having chest pain, dyspnea, shortness of breath.    Review of Systems   Constitutional: Positive for chills and fever.   Gastrointestinal: Positive for abdominal pain, nausea and vomiting.   Genitourinary: Negative.    Neurological: Positive for dizziness, weakness and headaches.   All other systems reviewed and are negative.      PMH: hypophosphatemia  MEDS: No current facility-administered medications for this visit.   No current outpatient medications on file.    Facility-Administered Medications Ordered in Other Visits:   •  senna-docusate (PERICOLACE or SENOKOT S) 8.6-50 MG per tablet 2 Tab, 2 Tab, Oral, BID PRN **AND** polyethylene glycol/lytes (MIRALAX) PACKET 1 Packet, 1 Packet, Oral, QDAY PRN **AND** magnesium hydroxide (MILK OF MAGNESIA) suspension 30 mL, 30 mL, Oral, QDAY PRN **AND** bisacodyl (DULCOLAX) suppository 10 mg, 10 mg, Rectal, QDAY PRN, Dena Carballo M.D.  •  potassium chloride SA (Kdur) tablet 40 mEq, 40 mEq, Oral, TID, Mynor Mcmahan M.D., 40 mEq at 11/15/19 0902  •  LORazepam (ATIVAN) injection 1 mg, 1 mg, Intravenous, Once, Destin Rodriguez M.D., Stopped at 11/14/19 1830  •  NS infusion, , Intravenous, Continuous, Dena Carballo M.D., Last Rate: 100 mL/hr at 11/15/19 0118  •   enoxaparin (LOVENOX) inj 40 mg, 40 mg, Subcutaneous, DAILY, [facility], 40 mg at 11/15/19 0439  •  acetaminophen (TYLENOL) tablet 650 mg, 650 mg, Oral, Q6HRS PRN, TimothyMARGARITA Cruz.D.  •  labetalol (NORMODYNE/TRANDATE) injection 10 mg, 10 mg, Intravenous, Q4HRS PRN, Timothynba Carballo M.DShalom, 10 mg at 11/15/19 0332  •  atorvastatin (LIPITOR) tablet 40 mg, 40 mg, Oral, Nightly, Timothynba Carballo M.D., Stopped at 11/14/19 2100  •  carBAMazepine (TEGRETOL) tablet 200 mg, 200 mg, Oral, Q EVENING, Timothynba Carballo M.D., 200 mg at 11/14/19 2328  •  escitalopram (LEXAPRO) tablet 20 mg, 20 mg, Oral, Q EVENING, Timothynba Carballo M.D., 20 mg at 11/14/19 2329  •  QUEtiapine (SEROQUEL) tablet 25 mg, 25 mg, Oral, Q EVENING, Timothynba Carballo M.D., 25 mg at 11/14/19 2329  ALLERGIES: No Known Allergies  SURGHX: No past surgical history on file.  SOCHX:  reports that he has quit smoking. His smoking use included cigarettes. He has never used smokeless tobacco. He reports previous alcohol use. He reports current drug use. Drugs: Marijuana and Inhaled.  FH: Family history was reviewed, no pertinent findings to report   Objective:   /82 (BP Location: Left arm, Patient Position: Sitting, BP Cuff Size: Adult)   Pulse (!) 58   Temp (!) 35.6 °C (96 °F) (Temporal)   Resp 16   SpO2 99%   Physical Exam  Vitals signs reviewed.   Constitutional:       Appearance: Normal appearance. He is well-developed. He is ill-appearing.      Comments: Patient is seated in wheelchair and looks very uncomfortable.  He is not actively vomiting.   HENT:      Head: Normocephalic and atraumatic.      Right Ear: External ear normal.      Left Ear: External ear normal.      Nose: Nose normal.   Eyes:      General: Lids are normal.      Conjunctiva/sclera: Conjunctivae normal.   Neck:      Musculoskeletal: Neck supple.   Cardiovascular:      Rate and Rhythm: Normal rate and regular rhythm.   Pulmonary:      Effort: Pulmonary effort is normal. No respiratory  distress.      Breath sounds: Normal breath sounds.   Skin:     General: Skin is warm and dry.      Capillary Refill: Capillary refill takes less than 2 seconds.   Neurological:      Mental Status: He is oriented to person, place, and time. He is lethargic.      Cranial Nerves: No cranial nerve deficit.      Sensory: No sensory deficit.   Psychiatric:         Speech: Speech normal.         Behavior: Behavior normal.         Thought Content: Thought content normal.         Judgment: Judgment normal.           Assessment/Plan:   1. Nausea and vomiting, intractability of vomiting not specified, unspecified vomiting type    2. Dizziness    Patient records reviewed.  I suspect that symptoms are secondary to hypophosphatemia, as current symptoms are consistent with previous episodes.  I would like him to go to the emergency room for reevaluation.  Patient concurs with this plan and will go via POV.  His wife will drive him.    Differential diagnosis, natural history, supportive care, and indications for immediate follow-up discussed.

## 2019-11-15 NOTE — H&P
Internal Medicine Admitting History and Physical    Note Author: Dena Carballo M.D.       Name Roby Viramontes     1955   Age/Sex 63 y.o. male   MRN [id number]   Code Status Full code      After 5PM or if no immediate response to page, please call for cross-coverage  Attending/Team: Dr Gifford / Glen  See Patient List for primary contact information  Call [phone] to page    1st Call - Day Intern (R1):   Dr Coe  2nd Call - Day Sr. Resident (R2/R3):   Dr Mcmahan        Chief Complaint:   Shaking, tremulous, weak, nausea, vomiting.    HPI:  Mr Ca is a 63 year old male with past medical history of  Seizures disorder presenting to the emergency department for evaluation of shivering, shaking, nausea and vomiting.  Patient states that he was fine  until 12:00 this afternoon when he suddenly felt shaky , was shivering along with nausea and  6-7 episodes of vomiting uptill now.  He also reports mild epigastric pain which is intermittent and  described as dull pain.  He denies any diarrhea.  He was admitted twice  in the past 2 months for similar symptoms and was found to have low phosphorus (<1) but  felt better after phosphorus repletion.  He had similar episodes today continue that his phosphorus was low. He denies any numbness weakness or tingling sensation.  He also has history of seizure disorder and is on carbamazepine.  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  Patient denies any seizure today.    On previous admission up included vitamin D which was low, normal PTH and normal calcium, high phosphorus excretion in the urine and high urine cortisol.    In the emergency department his temperature was 98.2 pulse 64, respirations 16, he was hypertensive 157/86.  Per patient wife she checks her blood pressure at home which is  usually under 130/80 but is always high whenever his phosphorus is low.  In the emergency department his phosphorus was<1, Low potassium,  bicarb 19, normal calcium, normal albumin.          Review of Systems   Constitutional: Positive for chills. Negative for fever and weight loss.   HENT: Negative for ear pain, hearing loss and tinnitus.    Eyes: Negative for blurred vision, double vision and photophobia.   Respiratory: Negative for cough, hemoptysis and sputum production.    Cardiovascular: Negative for chest pain, palpitations and leg swelling.   Gastrointestinal: Positive for abdominal pain, nausea and vomiting. Negative for constipation, diarrhea and heartburn.   Genitourinary: Negative for dysuria and urgency.   Musculoskeletal: Negative for myalgias and neck pain.   Skin: Negative for rash.   Neurological: Positive for weakness. Negative for dizziness and headaches.   Psychiatric/Behavioral: Negative for depression.             Past Medical History (Chronic medical problem, known complications and current treatment)    Seizure disorder       Past Surgical History:  Hippocampus removed       Current Outpatient Medications:  Home Medications     Reviewed by Ash Arreguin (Pharmacy Tech) on 11/14/19 at 1842  Med List Status: Complete   Medication Last Dose Status   atorvastatin (LIPITOR) 40 MG Tab 11/13/2019 Active   carBAMazepine (TEGRETOL) 200 MG Tab 11/13/2019 Active   escitalopram (LEXAPRO) 20 MG tablet 11/13/2019 Active   QUEtiapine (SEROQUEL) 25 MG Tab 11/13/2019 Active                Medication Allergy/Sensitivities:  No Known Allergies      Family History (mandatory)   No family history on file.   Mother had breast cancer     Social History (mandatory)   No alcohol use   Smokes marijuana  Once a day   No tobacco use     Social History     Socioeconomic History   • Marital status:      Spouse name: Not on file   • Number of children: Not on file   • Years of education: Not on file   •  Highest education level: Not on file   Occupational History   • Not on file   Social Needs   • Financial resource strain: Not on file   • Food insecurity:     Worry: Not on file     Inability: Not on file   • Transportation needs:     Medical: Not on file     Non-medical: Not on file   Tobacco Use   • Smoking status: Former Smoker     Types: Cigarettes   • Smokeless tobacco: Never Used   Substance and Sexual Activity   • Alcohol use: Not Currently   • Drug use: Yes     Types: Marijuana, Inhaled     Comment: marijuana   • Sexual activity: Not on file   Lifestyle   • Physical activity:     Days per week: Not on file     Minutes per session: Not on file   • Stress: Not on file   Relationships   • Social connections:     Talks on phone: Not on file     Gets together: Not on file     Attends Nondenominational service: Not on file     Active member of club or organization: Not on file     Attends meetings of clubs or organizations: Not on file     Relationship status: Not on file   • Intimate partner violence:     Fear of current or ex partner: Not on file     Emotionally abused: Not on file     Physically abused: Not on file     Forced sexual activity: Not on file   Other Topics Concern   • Not on file   Social History Narrative   • Not on file     Living situation: lives with wife in [place]   PCP : Pcp Pt States None    Physical Exam     Vitals:    11/14/19 2332 11/14/19 2359 11/15/19 0327 11/15/19 0425   BP: 135/80 125/68 (!) 196/110 157/86   Pulse: 63 (!) 58 64    Resp:  18 16    Temp:  36.6 °C (97.9 °F) 36.8 °C (98.2 °F)    TempSrc:  Temporal Temporal    SpO2: 95% 96% 96%    Weight:       Height:         Body mass index is 21.02 kg/m².  O2 therapy: Pulse Oximetry: 96 %, O2 (LPM): 0, O2 Delivery: None (Room Air)    Physical Exam   Constitutional: He is oriented to person, place, and time and well-developed, well-nourished, and in no distress.   HENT:   Head: Normocephalic and atraumatic.   Eyes: Pupils are equal, round, and  reactive to light. EOM are normal.   Neck: Normal range of motion. Neck supple.   Cardiovascular: Normal rate, regular rhythm, normal heart sounds and intact distal pulses. Exam reveals no gallop and no friction rub.   No murmur heard.  Pulmonary/Chest: Effort normal and breath sounds normal. No respiratory distress. He has no wheezes. He has no rales.   Abdominal: Soft. Bowel sounds are normal. He exhibits no distension. There is no tenderness. There is no rebound.   Musculoskeletal:         General: No edema.   Neurological: He is alert and oriented to person, place, and time. He has normal reflexes. No cranial nerve deficit. Gait normal.   Skin: Skin is warm and dry. No erythema.         Data Review       Old Records Request:   Completed  Current Records review/summary: Completed    Lab Data Review:  Recent Results (from the past 24 hour(s))   MAGNESIUM    Collection Time: 11/14/19  5:31 PM   Result Value Ref Range    Magnesium 1.6 1.5 - 2.5 mg/dL   PHOSPHORUS    Collection Time: 11/14/19  5:31 PM   Result Value Ref Range    Phosphorus <1.0 (LL) 2.5 - 4.5 mg/dL   CBC WITH DIFFERENTIAL    Collection Time: 11/14/19  5:31 PM   Result Value Ref Range    WBC 13.8 (H) 4.8 - 10.8 K/uL    RBC 4.95 4.70 - 6.10 M/uL    Hemoglobin 16.5 14.0 - 18.0 g/dL    Hematocrit 46.9 42.0 - 52.0 %    MCV 94.7 81.4 - 97.8 fL    MCH 33.3 (H) 27.0 - 33.0 pg    MCHC 35.2 33.7 - 35.3 g/dL    RDW 46.9 35.9 - 50.0 fL    Platelet Count 183 164 - 446 K/uL    MPV 9.7 9.0 - 12.9 fL    Neutrophils-Polys 88.00 (H) 44.00 - 72.00 %    Lymphocytes 7.10 (L) 22.00 - 41.00 %    Monocytes 4.30 0.00 - 13.40 %    Eosinophils 0.00 0.00 - 6.90 %    Basophils 0.20 0.00 - 1.80 %    Immature Granulocytes 0.40 0.00 - 0.90 %    Nucleated RBC 0.00 /100 WBC    Neutrophils (Absolute) 12.09 (H) 1.82 - 7.42 K/uL    Lymphs (Absolute) 0.98 (L) 1.00 - 4.80 K/uL    Monos (Absolute) 0.59 0.00 - 0.85 K/uL    Eos (Absolute) 0.00 0.00 - 0.51 K/uL    Baso (Absolute) 0.03 0.00 -  0.12 K/uL    Immature Granulocytes (abs) 0.06 0.00 - 0.11 K/uL    NRBC (Absolute) 0.00 K/uL   Comp Metabolic Panel    Collection Time: 19  5:31 PM   Result Value Ref Range    Sodium 141 135 - 145 mmol/L    Potassium 3.1 (L) 3.6 - 5.5 mmol/L    Chloride 104 96 - 112 mmol/L    Co2 19 (L) 20 - 33 mmol/L    Anion Gap 18.0 (H) 0.0 - 11.9    Glucose 180 (H) 65 - 99 mg/dL    Bun 11 8 - 22 mg/dL    Creatinine 1.02 0.50 - 1.40 mg/dL    Calcium 9.8 8.5 - 10.5 mg/dL    AST(SGOT) 15 12 - 45 U/L    ALT(SGPT) 12 2 - 50 U/L    Alkaline Phosphatase 162 (H) 30 - 99 U/L    Total Bilirubin 0.9 0.1 - 1.5 mg/dL    Albumin 4.6 3.2 - 4.9 g/dL    Total Protein 7.4 6.0 - 8.2 g/dL    Globulin 2.8 1.9 - 3.5 g/dL    A-G Ratio 1.6 g/dL   LIPASE    Collection Time: 19  5:31 PM   Result Value Ref Range    Lipase 19 11 - 82 U/L   DIAGNOSTIC ALCOHOL    Collection Time: 19  5:31 PM   Result Value Ref Range    Diagnostic Alcohol 0.00 0.00 g/dL   ESTIMATED GFR    Collection Time: 19  5:31 PM   Result Value Ref Range    GFR If African American >60 >60 mL/min/1.73 m 2    GFR If Non African American >60 >60 mL/min/1.73 m 2   EKG    Collection Time: 19 10:34 PM   Result Value Ref Range    Report       [facility] Emergency Dept.    Test Date:  2019  Pt Name:    BRISEYDA CHAUHAN            Department: ER  MRN:        [id number]                      Room:       [facility]  Gender:     Male                         Technician: [id number]  :        1955                   Requested By:WEI CARTER  Order #:    [id number]                    Reading MD:    Measurements  Intervals                                Axis  Rate:       58                           P:          57  KY:         172                          QRS:        54  QRSD:       108                          T:          31  QT:         516  QTc:        507    Interpretive Statements  SLOW SINUS ARRHYTHMIA, RATE  45- 66  PROBABLE LEFT ATRIAL  ABNORMALITY  BORDERLINE INTRAVENTRICULAR CONDUCTION DELAY  EARLY PRECORDIAL R/S TRANSITION  MINIMAL ST DEPRESSION, ANTEROLATERAL LEADS  PROLONGED QT INTERVAL  Compared to ECG 09/24/2019 11:00:18  ST (T wave) deviation now present  Sinus bradycardia no longer present  T-wa ve abnormality no longer present     CBC with Differential    Collection Time: 11/15/19  3:51 AM   Result Value Ref Range    WBC 10.4 4.8 - 10.8 K/uL    RBC 4.98 4.70 - 6.10 M/uL    Hemoglobin 16.3 14.0 - 18.0 g/dL    Hematocrit 46.6 42.0 - 52.0 %    MCV 93.6 81.4 - 97.8 fL    MCH 32.7 27.0 - 33.0 pg    MCHC 35.0 33.7 - 35.3 g/dL    RDW 46.3 35.9 - 50.0 fL    Platelet Count 182 164 - 446 K/uL    MPV 9.3 9.0 - 12.9 fL    Neutrophils-Polys 80.20 (H) 44.00 - 72.00 %    Lymphocytes 11.30 (L) 22.00 - 41.00 %    Monocytes 8.00 0.00 - 13.40 %    Eosinophils 0.00 0.00 - 6.90 %    Basophils 0.10 0.00 - 1.80 %    Immature Granulocytes 0.40 0.00 - 0.90 %    Nucleated RBC 0.00 /100 WBC    Neutrophils (Absolute) 8.36 (H) 1.82 - 7.42 K/uL    Lymphs (Absolute) 1.18 1.00 - 4.80 K/uL    Monos (Absolute) 0.83 0.00 - 0.85 K/uL    Eos (Absolute) 0.00 0.00 - 0.51 K/uL    Baso (Absolute) 0.01 0.00 - 0.12 K/uL    Immature Granulocytes (abs) 0.04 0.00 - 0.11 K/uL    NRBC (Absolute) 0.00 K/uL   Comp Metabolic Panel (CMP)    Collection Time: 11/15/19  3:51 AM   Result Value Ref Range    Sodium 139 135 - 145 mmol/L    Potassium 3.0 (L) 3.6 - 5.5 mmol/L    Chloride 104 96 - 112 mmol/L    Co2 19 (L) 20 - 33 mmol/L    Anion Gap 16.0 (H) 0.0 - 11.9    Glucose 134 (H) 65 - 99 mg/dL    Bun 10 8 - 22 mg/dL    Creatinine 0.86 0.50 - 1.40 mg/dL    Calcium 9.2 8.5 - 10.5 mg/dL    AST(SGOT) 15 12 - 45 U/L    ALT(SGPT) 12 2 - 50 U/L    Alkaline Phosphatase 146 (H) 30 - 99 U/L    Total Bilirubin 0.8 0.1 - 1.5 mg/dL    Albumin 4.6 3.2 - 4.9 g/dL    Total Protein 6.9 6.0 - 8.2 g/dL    Globulin 2.3 1.9 - 3.5 g/dL    A-G Ratio 2.0 g/dL   CREATINE KINASE    Collection Time: 11/15/19  3:51 AM    Result Value Ref Range    CPK Total 76 0 - 154 U/L   ESTIMATED GFR    Collection Time: 11/15/19  3:51 AM   Result Value Ref Range    GFR If African American >60 >60 mL/min/1.73 m 2    GFR If Non African American >60 >60 mL/min/1.73 m 2       Imaging/Procedures Review:    Independant Imaging Review: Completed  No orders to display            EKG:   EKG Independent Review: Completed  QTc:507, HR: 58, Normal Sinus Rhythm, no ST/T changes     Records reviewed and summarized in current documentation :  Yes  [employer] teaching service handout given to patient:  Yes         Assessment/Plan     Hypophosphatemia- (present on admission)  Assessment & Plan  -Patient presented with low phosphorus level, he was admitted twice in the past months due to hypophosphatemia and similar symptoms  -Work-up was done which included vitamin D level which was low with normal calcium and parathyroid level.  Urine phosphorus and urine cortisol was elevated.  -Patient is not mal nutritioned  And states that he eats adequately  -He denies any chronic diarrhea or steatosis which could  cause low hyperphosphatemia  -He was found to be having vitamin D deficiency which could cause hypophosphatemia but his calcium level and PTH level is normal making this unlikely.  He was prescribed vitamin D supplements but he did not take them.  -Some medication can cause hyper phosphatemia such as NSAIDs, niacin, acetazolamide.  But he is not on such medication.  -Acute respiratory alkalosis can also cause hypophosphatemia but his bicarb on admission is low.    Admit to medical floor   Received 30 mmol K-Phos in the ED  Re Ordered urine electrolytes   Re Ordered vitamin D and PTH  Nephrology consult for further recommendation  Continue to monitor electrolytes       QT prolongation  Assessment & Plan  Patient EKG shows QT of 507    Avoid QT prolongation medication    Seizure (HCC)- (present on admission)  Assessment & Plan  History of seizures in the past  On  carbamazepine, quetiapine, escitalopram  Continue medication        Anticipated Hospital stay:  >2 midnights        Quality Measures  Quality-Core Measures  PCP: Pcp Pt States None

## 2019-11-15 NOTE — PROGRESS NOTES
2 RN Skin Check    2 RN skin check complete.   Devices in place: NA.  Skin assessed under devices: N\A.  Confirmed pressure ulcers found on: NA.  New potential pressure ulcers noted on NA. Wound consult placed N/A.  The following interventions in place Education provided on measures to prevent skin breakdown. Pillows at bedside for frequent repositioning. Moisturizer given to patient. Pressure redistribution matress in place.     Skin is clean, dry, and intact.

## 2019-11-16 NOTE — PROGRESS NOTES
Internal Medicine Interval Note  Note Author: Mynor Mcmahan M.D.     Name Roby Viramontes     1955   Age/Sex 63 y.o. male   MRN [id number]   Code Status full     After 5PM or if no immediate response to page, please call for cross-coverage  Attending/Team: Ирина/Glen See Patient List for primary contact information  Call [phone] to page    1st Call - Day Intern (R1):   Saravanan 2nd Call - Day Sr. Resident (R2/R3):   Martir         Reason for interval visit  (Principal Problem)   Hypophosphatemia      Interval Problem Daily Status Update  (24 hours, problem oriented, brief subjective history, new lab/imaging data pertinent to that problem)   -NATHALIE o/n, phos level improved, and symptoms resolved  -repleting K  -patient would like to leave, discussed need for further workup and risk of leaving prior to that without further monitoring and repletion of phos; patient and wife still would like to be discharged planning to follow up with PCP (will establish by early next week) and were considering going to [facility] for further care  -repeat K 3.6, given an additional 40mEq Kdur  -patient phos decreased from 5.1 this am to 3.0 this afternoon, prescribed phos with instructions to take if he becomes symptomatic again, additionally discussed taking additional supplements daily for the next 2 days until he can establish with new PCP  -plan to discharge patient today at his request, while strongly recommending he stay to complete workup and treatment, discussed risks including morbidity and mortality, along with seeking immediate medical care if he becomes symptomatic again       Review of Systems   Constitutional: Negative for chills and fever.   HENT: Negative for ear pain and hearing loss.    Eyes: Negative for blurred vision and double vision.   Respiratory: Negative for cough and shortness of breath.    Cardiovascular: Negative for chest pain and palpitations.   Gastrointestinal: Negative for  abdominal pain, constipation, diarrhea, heartburn, nausea and vomiting.   Genitourinary: Negative for dysuria and urgency.   Musculoskeletal: Negative for myalgias and neck pain.   Skin: Negative for itching and rash.   Neurological: Negative for dizziness, weakness and headaches.   Psychiatric/Behavioral: Negative for depression and suicidal ideas.       Disposition/Barriers to discharge:    clinical improvement    Consultants/Specialty  none  PCP: Pcp Pt States None      Quality Measures  Quality-Core Measures   Reviewed items::  Labs reviewed and Medications reviewed  Chavis catheter::  No Chavis          Physical Exam       Vitals:    11/15/19 0425 11/15/19 0718 11/15/19 1126 11/15/19 1609   BP: 157/86 106/58 124/73 (!) 161/88   Pulse:  60 69 68   Resp:  16 16 16   Temp:  37.2 °C (99 °F) 36.7 °C (98.1 °F) 37 °C (98.6 °F)   TempSrc:  Temporal Temporal Temporal   SpO2:  92% 94% 97%   Weight:       Height:         Body mass index is 21.02 kg/m².    Oxygen Therapy:  Pulse Oximetry: 97 %, O2 (LPM): 0, O2 Delivery: None (Room Air)    Physical Exam   Constitutional: He is oriented to person, place, and time and well-developed, well-nourished, and in no distress. No distress.   HENT:   Head: Normocephalic and atraumatic.   Eyes: Pupils are equal, round, and reactive to light. Conjunctivae and EOM are normal.   Neck: Normal range of motion. Neck supple. No tracheal deviation present.   Cardiovascular: Normal rate, regular rhythm, normal heart sounds and intact distal pulses. Exam reveals no gallop and no friction rub.   No murmur heard.  Pulmonary/Chest: Effort normal and breath sounds normal. No respiratory distress. He has no wheezes. He has no rales. He exhibits no tenderness.   Abdominal: Soft. Bowel sounds are normal. He exhibits no distension and no mass. There is no tenderness. There is no rebound and no guarding.   Musculoskeletal: Normal range of motion.         General: No tenderness, deformity or edema.    Neurological: He is alert and oriented to person, place, and time. Coordination normal.   Skin: Skin is warm and dry. No rash noted. He is not diaphoretic. No erythema. No pallor.   Psychiatric: Mood and affect normal.             Assessment/Plan     * Hypophosphatemia- (present on admission)  Assessment & Plan  -symptoms improved following repletion of phosphat    -monitor and replete  -SPEP, lead level pending  -urine lytes deferred as patient wanted to be discharged      Hypokalemia- (present on admission)  Assessment & Plan  -monitor and replete    Hypomagnesemia  Assessment & Plan  -monitor and replete    QT prolongation  Assessment & Plan  Patient EKG shows QT of 507  Avoid QT prolongation medication    Seizure (HCC)- (present on admission)  Assessment & Plan  History of seizures in the past  On carbamazepine, quetiapine, escitalopram  Continue home meds

## 2019-11-16 NOTE — CARE PLAN
Problem: Communication  Goal: The ability to communicate needs accurately and effectively will improve  Outcome: MET     Problem: Safety  Goal: Will remain free from injury  Outcome: MET  Goal: Will remain free from falls  Outcome: MET     Problem: Infection  Goal: Will remain free from infection  Outcome: MET     Problem: Venous Thromboembolism (VTW)/Deep Vein Thrombosis (DVT) Prevention:  Goal: Patient will participate in Venous Thrombosis (VTE)/Deep Vein Thrombosis (DVT)Prevention Measures  Outcome: MET     Problem: Bowel/Gastric:  Goal: Normal bowel function is maintained or improved  Outcome: MET  Goal: Will not experience complications related to bowel motility  Outcome: MET     Problem: Knowledge Deficit  Goal: Knowledge of disease process/condition, treatment plan, diagnostic tests, and medications will improve  Outcome: MET  Goal: Knowledge of the prescribed therapeutic regimen will improve  Outcome: MET     Problem: Discharge Barriers/Planning  Goal: Patient's continuum of care needs will be met  Outcome: MET

## 2019-11-16 NOTE — DISCHARGE SUMMARY
Internal Medicine Discharge Summary  Note Author: Mynor Mcmahan M.D.       Name Roby Viramontes     1955   Age/Sex 63 y.o. male   MRN [id number]         Admit Date:  2019       Discharge Date:   11/15/2019    Service:   R Internal Medicine Yellow Team  Attending Physician(s):   Ирина       Senior Resident(s):   Martir  Deon Resident(s):   Saravanan  PCP: Pcp Pt States None      Primary Diagnosis:   Hypophosphatemia    Secondary Diagnoses:                Principal Problem:    Hypophosphatemia POA: Yes  Active Problems:    Hypokalemia POA: Yes    Seizure (HCC) POA: Yes    QT prolongation POA: Unknown    Hypomagnesemia POA: Unknown  Resolved Problems:    * No resolved hospital problems. 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 He had two admissions in the last 2 months for same diagnosis. He had a workup which included a low vitamin D at 20 (he denies taking any supplementation at home), a normal PTH, and a normal calcium. He did have a large amount of phosphate being excreted from the urine at 1595 mg/d. He also had a high urine cortisol. For this visit phosphate, magnesium, and potassium was repleted. Further workup was planned but patient decided he would like to be discharged and instead of letting him leave AMA; we rechecked and repleted his electrolytes, arranged for close PCP follow up (instructed to follow up within a week), prescribed phosphate supplements with instructions for repletion as needed before his follow up appointment, and scheduled repeat labs in 2 days following discharge. Some of the work up was done, with lead level and SPEP pending. We discussed the risks of leaving without further treatment/workup, patient still would like to be discharged. Patient discharged in stable condition but instructed if he developed symptoms to seek immediate  medical attention.     Patient /Hospital Summary (Details -- Problem Oriented) :          Hypokalemia  Assessment & Plan  -monitor and replete    * Hypophosphatemia  Assessment & Plan  -symptoms improved following repletion of phosphat    -monitor and replete  -SPEP, lead level pending  -urine lytes deferred as patient wanted to be discharged      Hypomagnesemia  Assessment & Plan  -monitor and replete    QT prolongation  Assessment & Plan  Patient EKG shows QT of 507  Avoid QT prolongation medication    Seizure (HCC)  Assessment & Plan  History of seizures in the past  On carbamazepine, quetiapine, escitalopram  Continue home meds        Consultants:     none    Procedures:        none    Imaging/ Testing:      No orders to display         Discharge Medications:         Medication Reconciliation: Completed       Medication List      START taking these medications      Instructions   K-PHOS-NEUTRAL [phone] MG Tabs   Take 1-2 Tabs by mouth 1 time daily as needed (for symptoms of low phosphate (nausea, vomiting, malaise)).  Dose:  1-2 Tab        CONTINUE taking these medications      Instructions   atorvastatin 40 MG Tabs  Commonly known as:  LIPITOR   Take 40 mg by mouth every evening.  Dose:  40 mg     carBAMazepine 200 MG Tabs  Commonly known as:  TEGRETOL   Take 200 mg by mouth every evening.  Dose:  200 mg     escitalopram 20 MG tablet  Commonly known as:  LEXAPRO   Take 20 mg by mouth every evening.  Dose:  20 mg     QUEtiapine 25 MG Tabs  Commonly known as:  SEROQUEL   Take 25 mg by mouth every evening.  Dose:  25 mg            Can use .DISCHARGEMEDSLIST if going to another facility         Disposition:   home    Diet:   As tolerated    Activity:   As tolerated    Instructions:         The patient was instructed to return to the ER in the event of worsening symptoms. I have counseled the patient on the importance of compliance and the patient has agreed to proceed with all medical recommendations and follow  up plan indicated above.   The patient understands that all medications come with benefits and risks. Risks may include permanent injury or death and these risks can be minimized with close reassessment and monitoring.        Primary Care Provider:      Discharge summary faxed to primary care provider:  Deferred  Copy of discharge summary given to the patient: Deferred      Follow up appointment details :      No future appointments.  [facility]  [address]  [address]  [place] [id number]  [phone]    Please call to schedule your appointment to establish with a Primary Care Physician. They are contracted with your [employer]. Thank you        Pending Studies:        Lead level  SPEP w/reflex to aguilar rea g, m    Time spent on discharge day patient visit, preparing discharge paperwork and arranging for patient follow up.    Summary of follow up issues:   -further workup/treatment for hypophosphatemia  -clinical improvement/management of chronic conditions    Discharge Time (Minutes) :    55  Hospital Course Type:  Inpatient Stay >2 midnights      Condition on Discharge    ______________________________________________________________________    Interval history/exam for day of discharge:     -NATHALIE o/n, phos level improved, and symptoms resolved  -repleting K  -patient would like to leave, discussed need for further workup and risk of leaving prior to that without further monitoring and repletion of phos; patient and wife still would like to be discharged planning to follow up with PCP (will establish by early next week) and were considering going to [facility] for further care  -repeat K 3.6, given an additional 40mEq Kdur  -patient phos decreased from 5.1 this am to 3.0 this afternoon, prescribed phos with instructions to take if he becomes symptomatic again, additionally discussed taking additional supplements daily for the next 2 days until he can establish with new PCP  -plan to discharge patient today at his  request, while strongly recommending he stay to complete workup and treatment, discussed risks including morbidity and mortality, along with seeking immediate medical care if he becomes symptomatic again    Physical Exam   Constitutional: He is oriented to person, place, and time and well-developed, well-nourished, and in no distress. No distress.   HENT:   Head: Normocephalic and atraumatic.   Eyes: Pupils are equal, round, and reactive to light. Conjunctivae and EOM are normal.   Neck: Normal range of motion. Neck supple. No tracheal deviation present.   Cardiovascular: Normal rate, regular rhythm, normal heart sounds and intact distal pulses. Exam reveals no gallop and no friction rub.   No murmur heard.  Pulmonary/Chest: Effort normal and breath sounds normal. No respiratory distress. He has no wheezes. He has no rales. He exhibits no tenderness.   Abdominal: Soft. Bowel sounds are normal. He exhibits no distension and no mass. There is no tenderness. There is no rebound and no guarding.   Musculoskeletal: Normal range of motion.         General: No tenderness, deformity or edema.   Neurological: He is alert and oriented to person, place, and time. Coordination normal.   Skin: Skin is warm and dry. No rash noted. He is not diaphoretic. No erythema. No pallor.   Psychiatric: Mood and affect normal.      Most Recent Labs:    Lab Results   Component Value Date/Time    WBC 10.4 11/15/2019 03:51 AM    RBC 4.98 11/15/2019 03:51 AM    HEMOGLOBIN 16.3 11/15/2019 03:51 AM    HEMATOCRIT 46.6 11/15/2019 03:51 AM    MCV 93.6 11/15/2019 03:51 AM    MCH 32.7 11/15/2019 03:51 AM    MCHC 35.0 11/15/2019 03:51 AM    MPV 9.3 11/15/2019 03:51 AM    NEUTSPOLYS 80.20 (H) 11/15/2019 03:51 AM    LYMPHOCYTES 11.30 (L) 11/15/2019 03:51 AM    MONOCYTES 8.00 11/15/2019 03:51 AM    EOSINOPHILS 0.00 11/15/2019 03:51 AM    BASOPHILS 0.10 11/15/2019 03:51 AM      Lab Results   Component Value Date/Time    SODIUM 140 11/15/2019 02:05 PM     POTASSIUM 3.6 11/15/2019 02:05 PM    CHLORIDE 107 11/15/2019 02:05 PM    CO2 24 11/15/2019 02:05 PM    GLUCOSE 135 (H) 11/15/2019 02:05 PM    BUN 10 11/15/2019 02:05 PM    CREATININE 0.93 11/15/2019 02:05 PM      Lab Results   Component Value Date/Time    ALTSGPT 12 11/15/2019 03:51 AM    ASTSGOT 15 11/15/2019 03:51 AM    ALKPHOSPHAT 146 (H) 11/15/2019 03:51 AM    TBILIRUBIN 0.8 11/15/2019 03:51 AM    LIPASE 19 11/14/2019 05:31 PM    ALBUMIN 4.6 11/15/2019 03:51 AM    GLOBULIN 2.3 11/15/2019 03:51 AM     No results found for: PROTHROMBTM, INR

## 2019-11-16 NOTE — PROGRESS NOTES
Patient given d/c instructions and f/u appointment information. All questions answered per wife and patient and signature obtained. Belongings retained per patient. Left ambulatory in NAD to be driven home by wife.

## 2019-11-16 NOTE — DISCHARGE INSTRUCTIONS
Hypophosphatemia  Hypophosphatemia means you have a lack of phosphates in your blood. Phosphates are important for the strength and structure of bones and teeth, and for muscle functioning. Low blood phosphate levels can cause a variety of symptoms and problems.  CAUSES  Causes of hypophosphatemia may include:  · Alcoholism.  · Chronic diarrhea.  · Starvation.  · Vitamin D deficiency.  · Excessive use of antacids.  · Use of steroid medicines.  · Hyperparathyroidism.  · Intestinal problems that interfere with normal absorption of nutrients.  · Diabetic ketoacidosis.  · Total parenteral nutrition.  · Nasogastric suction.  · Severe infections.  · Genetic kidney problems, such as autosomal dominant hypophosphatemic rickets.  · Oncogenic osteomalacia (a condition of low phosphate levels with certain types of tumors) .  · Surgery to remove a part of the liver.  · Organ transplantation.  · Sudden liver failure.  · Metabolic syndrome.  SYMPTOMS  · Bone pain.  · Bowed legs.  · Growth problems (short height).  · Weak muscles.  · Confusion.  · Shortness of breath.  · Seizures.  DIAGNOSIS  Hypophosphatemia is usually diagnosed through blood tests.   TREATMENT  Treatment for hypophosphatemia includes giving phosphate supplements. These can be given by mouth or through the vein (intravenously), depending on the severity of the symptoms and deficiency. Vitamin D may also be given to treat hypophosphatemia.  HOME CARE INSTRUCTIONS  Consult with a dietitian as recommended to make sure you are eating the most healthful diet possible.  SEEK IMMEDIATE MEDICAL CARE IF:  · You develop chest pain.  · You have difficulty breathing.  · You develop increased weakness.  · You have a suspected bone fracture.  · You have severe pain.  · You develop mood, memory, or personality changes.  This information is not intended to replace advice given to you by your health care provider. Make sure you discuss any questions you have with your health care  provider.  Document Released: 04/03/2012 Document Revised: 04/14/2014 Document Reviewed: 05/04/2016  [employer] Interactive Patient Education © 2017 [employer]  Discharge Instructions    Discharged to home by car with relative. Discharged via walking, hospital escort: Refused.  Special equipment needed: Not Applicable    Be sure to schedule a follow-up appointment with your primary care doctor or any specialists as instructed.     Discharge Plan:   Smoking Cessation Offered: Patient Refused  Influenza Vaccine Indication: Patient Refuses    I understand that a diet low in cholesterol, fat, and sodium is recommended for good health. Unless I have been given specific instructions below for another diet, I accept this instruction as my diet prescription.   Other diet: cardiac      Special Instructions: None    · Is patient discharged on Warfarin / Coumadin?   No     Depression / Suicide Risk    [facility], it is important to learn how to keep safe from harming yourself.    Recognize the warning signs:  · Abrupt changes in personality, positive or negative- including increase in energy   · Giving away possessions  · Change in eating patterns- significant weight changes-  positive or negative  · Change in sleeping patterns- unable to sleep or sleeping all the time   · Unwillingness or inability to communicate  · Depression  · Unusual sadness, discouragement and loneliness  · Talk of wanting to die  · Neglect of personal appearance   · Rebelliousness- reckless behavior  · Withdrawal from people/activities they love  · Confusion- inability to concentrate     If you or a loved one observes any of these behaviors or has concerns about self-harm, here's what you can do:  · Talk about it- your feelings and reasons for harming yourself  · Remove any means that you might use to hurt yourself (examples: pills, rope, extension cords, firearm)  · Get professional help from the community  (Mental Health, Substance Abuse, psychological counseling)  · Do not be alone:Call your Safe Contact- someone whom you trust who will be there for you.  · Call your local CRISIS HOTLINE 941-4267 or 274-399-1984  · Call your local Children's Mobile Crisis Response Team [place] (453) 698-8231 or www.[employer]  · Call the toll free National Suicide Prevention Hotlines   · National Suicide Prevention Lifeline 221-529-KYTK (3891)  · National Hope Line Network 800-SUICIDE (305-2852)

## 2019-11-17 LAB — LEAD BLDV-MCNC: <2 UG/DL (ref 0–4.9)

## 2019-11-18 LAB
ALBUMIN SERPL ELPH-MCNC: 4.36 G/DL (ref 3.75–5.01)
ALPHA1 GLOB SERPL ELPH-MCNC: 0.33 G/DL (ref 0.19–0.46)
ALPHA2 GLOB SERPL ELPH-MCNC: 0.82 G/DL (ref 0.48–1.05)
B-GLOBULIN SERPL ELPH-MCNC: 0.77 G/DL (ref 0.48–1.1)
GAMMA GLOB SERPL ELPH-MCNC: 0.81 G/DL (ref 0.62–1.51)
INTERPRETATION SERPL IFE-IMP: NORMAL
MONOCLON BAND OBS SERPL: NORMAL
PATHOLOGY STUDY: NORMAL
PROT SERPL-MCNC: 7.1 G/DL (ref 6.3–8.2)

## 2019-11-22 ENCOUNTER — HOSPITAL ENCOUNTER (OUTPATIENT)
Dept: LAB | Facility: MEDICAL CENTER | Age: 64
End: 2019-11-22
Attending: STUDENT IN AN ORGANIZED HEALTH CARE EDUCATION/TRAINING PROGRAM
Payer: MEDICARE

## 2019-11-22 DIAGNOSIS — E83.39 HYPOPHOSPHATEMIA: ICD-10-CM

## 2019-11-22 DIAGNOSIS — E87.6 HYPOKALEMIA: ICD-10-CM

## 2019-11-22 DIAGNOSIS — E87.8 ELECTROLYTE ABNORMALITY: ICD-10-CM

## 2019-11-22 DIAGNOSIS — R11.2 NAUSEA AND VOMITING, INTRACTABILITY OF VOMITING NOT SPECIFIED, UNSPECIFIED VOMITING TYPE: ICD-10-CM

## 2019-11-22 LAB
ANION GAP SERPL CALC-SCNC: 8 MMOL/L (ref 0–11.9)
BUN SERPL-MCNC: 9 MG/DL (ref 8–22)
CALCIUM SERPL-MCNC: 8.9 MG/DL (ref 8.5–10.5)
CHLORIDE SERPL-SCNC: 108 MMOL/L (ref 96–112)
CO2 SERPL-SCNC: 25 MMOL/L (ref 20–33)
CREAT SERPL-MCNC: 0.93 MG/DL (ref 0.5–1.4)
FASTING STATUS PATIENT QL REPORTED: NORMAL
GLUCOSE SERPL-MCNC: 87 MG/DL (ref 65–99)
MAGNESIUM SERPL-MCNC: 1.9 MG/DL (ref 1.5–2.5)
PHOSPHATE SERPL-MCNC: 2.2 MG/DL (ref 2.5–4.5)
POTASSIUM SERPL-SCNC: 3.9 MMOL/L (ref 3.6–5.5)
SODIUM SERPL-SCNC: 141 MMOL/L (ref 135–145)

## 2019-11-22 PROCEDURE — 83735 ASSAY OF MAGNESIUM: CPT

## 2019-11-22 PROCEDURE — 80048 BASIC METABOLIC PNL TOTAL CA: CPT

## 2019-11-22 PROCEDURE — 36415 COLL VENOUS BLD VENIPUNCTURE: CPT

## 2019-11-22 PROCEDURE — 84100 ASSAY OF PHOSPHORUS: CPT

## 2020-05-09 ENCOUNTER — HOSPITAL ENCOUNTER (INPATIENT)
Dept: HOSPITAL 8 - ED | Age: 65
LOS: 2 days | Discharge: LEFT BEFORE DISCHARGE | DRG: 208 | End: 2020-05-11
Attending: INTERNAL MEDICINE | Admitting: HOSPITALIST
Payer: MEDICARE

## 2020-05-09 VITALS — DIASTOLIC BLOOD PRESSURE: 72 MMHG | SYSTOLIC BLOOD PRESSURE: 116 MMHG

## 2020-05-09 VITALS — BODY MASS INDEX: 24.25 KG/M2 | WEIGHT: 179.02 LBS | HEIGHT: 72 IN

## 2020-05-09 DIAGNOSIS — Z53.29: ICD-10-CM

## 2020-05-09 DIAGNOSIS — D75.89: ICD-10-CM
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
## 2020-05-09 DIAGNOSIS — E87.6: ICD-10-CM

## 2020-05-09 DIAGNOSIS — E87.2: ICD-10-CM

## 2020-05-09 DIAGNOSIS — Z20.828: ICD-10-CM

## 2020-05-09 DIAGNOSIS — D72.829: ICD-10-CM
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
## 2020-05-09 DIAGNOSIS — G40.909: ICD-10-CM

## 2020-05-09 DIAGNOSIS — J96.00: Primary | ICD-10-CM
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
## 2020-05-09 DIAGNOSIS — I10: ICD-10-CM
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
## 2020-05-09 DIAGNOSIS — Z79.899: ICD-10-CM

## 2020-05-09 DIAGNOSIS — S50.312A: ICD-10-CM

## 2020-05-09 LAB
ALBUMIN SERPL-MCNC: 3.9 G/DL (ref 3.4–5)
ALP SERPL-CCNC: 156 U/L (ref 45–117)
ALT SERPL-CCNC: 20 U/L (ref 12–78)
ANION GAP SERPL CALC-SCNC: 10 MMOL/L (ref 5–15)
APTT BLD: 23 SECONDS (ref 25–31)
BASOPHILS # BLD AUTO: 0.05 X10^3/UL (ref 0–0.1)
BASOPHILS NFR BLD AUTO: 0 % (ref 0–1)
BILIRUB SERPL-MCNC: 0.9 MG/DL (ref 0.2–1)
CALCIUM SERPL-MCNC: 8.6 MG/DL (ref 8.5–10.1)
CHLORIDE SERPL-SCNC: 105 MMOL/L (ref 98–107)
CK SERPL-CCNC: 151 U/L (ref 39–308)
CREAT SERPL-MCNC: 1.1 MG/DL (ref 0.7–1.3)
CULTURE INDICATED?: NO
EOSINOPHIL # BLD AUTO: 0.09 X10^3/UL (ref 0–0.4)
EOSINOPHIL NFR BLD AUTO: 1 % (ref 1–7)
ERYTHROCYTE [DISTWIDTH] IN BLOOD BY AUTOMATED COUNT: 14.3 % (ref 9.4–14.8)
ERYTHROCYTE [SEDIMENTATION RATE] IN BLOOD BY WESTERGREN METHOD: 7 MM/HR (ref 0–10)
EST. AVERAGE GLUCOSE BLD GHB EST-MCNC: 97 MG/DL (ref 0–126)
HCT (SEDRATE): 46.9 % (ref 39.2–51.8)
INR PPP: 1 (ref 0.93–1.1)
LYMPHOCYTES # BLD AUTO: 0.44 X10^3/UL (ref 1–3.4)
LYMPHOCYTES NFR BLD AUTO: 3 % (ref 22–44)
MCH RBC QN AUTO: 33.6 PG (ref 27.5–34.5)
MCHC RBC AUTO-ENTMCNC: 33.4 G/DL (ref 33.2–36.2)
MCV RBC AUTO: 100.4 FL (ref 81–97)
MD: NO
MICROSCOPIC: (no result)
MONOCYTES # BLD AUTO: 0.57 X10^3/UL (ref 0.2–0.8)
MONOCYTES NFR BLD AUTO: 4 % (ref 2–9)
NEUTROPHILS # BLD AUTO: 13.5 X10^3/UL (ref 1.8–6.8)
NEUTROPHILS NFR BLD AUTO: 92 % (ref 42–75)
O2/TOTAL GAS SETTING VFR VENT: 60 %
PLATELET # BLD AUTO: 194 X10^3/UL (ref 130–400)
PMV BLD AUTO: 7.9 FL (ref 7.4–10.4)
PROT SERPL-MCNC: 7.7 G/DL (ref 6.4–8.2)
PROTHROMBIN TIME: 10.6 SECONDS (ref 9.6–11.5)
RBC # BLD AUTO: 4.92 X10^6/UL (ref 4.38–5.82)
TRIGL SERPL-MCNC: 129 MG/DL (ref 50–200)
TROPONIN I SERPL-MCNC: 0.45 NG/ML (ref 0–0.04)
TROPONIN I SERPL-MCNC: 0.67 NG/ML (ref 0–0.04)

## 2020-05-09 PROCEDURE — 82550 ASSAY OF CK (CPK): CPT

## 2020-05-09 PROCEDURE — 0BH17EZ INSERTION OF ENDOTRACHEAL AIRWAY INTO TRACHEA, VIA NATURAL OR ARTIFICIAL OPENING: ICD-10-PCS | Performed by: HOSPITALIST

## 2020-05-09 PROCEDURE — 85730 THROMBOPLASTIN TIME PARTIAL: CPT

## 2020-05-09 PROCEDURE — 80048 BASIC METABOLIC PNL TOTAL CA: CPT

## 2020-05-09 PROCEDURE — 96374 THER/PROPH/DIAG INJ IV PUSH: CPT

## 2020-05-09 PROCEDURE — 83605 ASSAY OF LACTIC ACID: CPT

## 2020-05-09 PROCEDURE — 36600 WITHDRAWAL OF ARTERIAL BLOOD: CPT

## 2020-05-09 PROCEDURE — 93308 TTE F-UP OR LMTD: CPT

## 2020-05-09 PROCEDURE — 0T9B70Z DRAINAGE OF BLADDER WITH DRAINAGE DEVICE, VIA NATURAL OR ARTIFICIAL OPENING: ICD-10-PCS | Performed by: HOSPITALIST

## 2020-05-09 PROCEDURE — 96376 TX/PRO/DX INJ SAME DRUG ADON: CPT

## 2020-05-09 PROCEDURE — 84100 ASSAY OF PHOSPHORUS: CPT

## 2020-05-09 PROCEDURE — 84132 ASSAY OF SERUM POTASSIUM: CPT

## 2020-05-09 PROCEDURE — 82803 BLOOD GASES ANY COMBINATION: CPT

## 2020-05-09 PROCEDURE — 70498 CT ANGIOGRAPHY NECK: CPT

## 2020-05-09 PROCEDURE — 83690 ASSAY OF LIPASE: CPT

## 2020-05-09 PROCEDURE — 85651 RBC SED RATE NONAUTOMATED: CPT

## 2020-05-09 PROCEDURE — 80047 BASIC METABLC PNL IONIZED CA: CPT

## 2020-05-09 PROCEDURE — 80053 COMPREHEN METABOLIC PANEL: CPT

## 2020-05-09 PROCEDURE — 84145 PROCALCITONIN (PCT): CPT

## 2020-05-09 PROCEDURE — 87081 CULTURE SCREEN ONLY: CPT

## 2020-05-09 PROCEDURE — 80307 DRUG TEST PRSMV CHEM ANLYZR: CPT

## 2020-05-09 PROCEDURE — 84478 ASSAY OF TRIGLYCERIDES: CPT

## 2020-05-09 PROCEDURE — 93321 DOPPLER ECHO F-UP/LMTD STD: CPT

## 2020-05-09 PROCEDURE — 85610 PROTHROMBIN TIME: CPT

## 2020-05-09 PROCEDURE — 36415 COLL VENOUS BLD VENIPUNCTURE: CPT

## 2020-05-09 PROCEDURE — 93005 ELECTROCARDIOGRAM TRACING: CPT

## 2020-05-09 PROCEDURE — 95819 EEG AWAKE AND ASLEEP: CPT

## 2020-05-09 PROCEDURE — 71045 X-RAY EXAM CHEST 1 VIEW: CPT

## 2020-05-09 PROCEDURE — 85025 COMPLETE CBC W/AUTO DIFF WBC: CPT

## 2020-05-09 PROCEDURE — 70450 CT HEAD/BRAIN W/O DYE: CPT

## 2020-05-09 PROCEDURE — 5A1935Z RESPIRATORY VENTILATION, LESS THAN 24 CONSECUTIVE HOURS: ICD-10-PCS | Performed by: HOSPITALIST

## 2020-05-09 PROCEDURE — 82140 ASSAY OF AMMONIA: CPT

## 2020-05-09 PROCEDURE — 31500 INSERT EMERGENCY AIRWAY: CPT

## 2020-05-09 PROCEDURE — 70496 CT ANGIOGRAPHY HEAD: CPT

## 2020-05-09 PROCEDURE — 84484 ASSAY OF TROPONIN QUANT: CPT

## 2020-05-09 PROCEDURE — 82962 GLUCOSE BLOOD TEST: CPT

## 2020-05-09 PROCEDURE — 82607 VITAMIN B-12: CPT

## 2020-05-09 PROCEDURE — 87040 BLOOD CULTURE FOR BACTERIA: CPT

## 2020-05-09 PROCEDURE — 94002 VENT MGMT INPAT INIT DAY: CPT

## 2020-05-09 PROCEDURE — 81001 URINALYSIS AUTO W/SCOPE: CPT

## 2020-05-09 PROCEDURE — U0001 2019-NCOV DIAGNOSTIC P: HCPCS

## 2020-05-09 PROCEDURE — 83036 HEMOGLOBIN GLYCOSYLATED A1C: CPT

## 2020-05-09 PROCEDURE — 96375 TX/PRO/DX INJ NEW DRUG ADDON: CPT

## 2020-05-09 PROCEDURE — 83735 ASSAY OF MAGNESIUM: CPT

## 2020-05-09 PROCEDURE — 80061 LIPID PANEL: CPT

## 2020-05-09 PROCEDURE — 93325 DOPPLER ECHO COLOR FLOW MAPG: CPT

## 2020-05-09 RX ADMIN — FAMOTIDINE SCH MG: 10 INJECTION INTRAVENOUS at 21:20

## 2020-05-09 RX ADMIN — PROPOFOL PRN MLS/HR: 10 INJECTION, EMULSION INTRAVENOUS at 15:44

## 2020-05-09 RX ADMIN — SODIUM CHLORIDE, PRESERVATIVE FREE SCH ML: 5 INJECTION INTRAVENOUS at 21:20

## 2020-05-09 RX ADMIN — PROPOFOL PRN MLS/HR: 10 INJECTION, EMULSION INTRAVENOUS at 09:41

## 2020-05-09 RX ADMIN — INSULIN LISPRO SCH UNITS: 100 INJECTION, SOLUTION INTRAVENOUS; SUBCUTANEOUS at 15:00

## 2020-05-09 RX ADMIN — ENOXAPARIN SODIUM SCH MG: 40 INJECTION SUBCUTANEOUS at 21:19

## 2020-05-09 RX ADMIN — AZITHROMYCIN FOR INJECTION INJECTION, POWDER, LYOPHILIZED, FOR SOLUTION SCH MLS/HR: 500 INJECTION INTRAVENOUS at 21:50

## 2020-05-09 RX ADMIN — SODIUM CHLORIDE, SODIUM LACTATE, POTASSIUM CHLORIDE, AND CALCIUM CHLORIDE SCH MLS/HR: .6; .31; .03; .02 INJECTION, SOLUTION INTRAVENOUS at 17:03

## 2020-05-09 RX ADMIN — INSULIN LISPRO SCH UNITS: 100 INJECTION, SOLUTION INTRAVENOUS; SUBCUTANEOUS at 21:00

## 2020-05-09 RX ADMIN — ATORVASTATIN CALCIUM SCH MG: 40 TABLET, FILM COATED ORAL at 21:20

## 2020-05-09 NOTE — NUR
REPORT TO GATO MUNOZ. PT MOVED UP IN BED, HOB TO LEVEL OF COMFORT. RT AT 
BEDSIDE FOR TRANSPORT. PT PROPOFOL TITRATION FOR PT COMFORT. SIDE RAILS UP. NS 
AND PROPOFOL INFUSING PER EMAR.

## 2020-05-09 NOTE — NUR
PT RETURNED TO ROOM WITH RN, TECH, RT. BACK ON MAIN MONITOR. ORDERS FROM ERMD 
TO AID IN SEDATION. PT ILLUSTRATES PURPOSEFUL MOVEMENT TO MAYO AND ET TUBE. 
ENCOURAGED TO REST, MEDICATION PER EMAR.

## 2020-05-09 NOTE — NUR
PT ARRIVED AT APPROX 0915 AM, BIB [facility] FROM NEAR HOME. PER WIFE, PT LEFT HOME 
AT APPROXIMATELY 0830 AND "WANDERED OFF". WIFE DROVE AND FOUND PT ON SIDE OF 
ROAD, DOWN AND ALTERED. UPON ENTRANCE TO ED, PT SHAKING AND DISPLAYING SOME 
POSTURING MOVEMENT WITH ARMS. PT NOT VERBALLY RESPONSIVE. IMMEDIATE INTUBATION 
IN ORDER TO PROTECT PT'S AIRWAY AND GET CT OF HEAD. DR VILLAGRAN AT BEDSIDE FOR 
TUBE PLACEMENT. 8.0 ET TUBE PLACED 23 AT THE TEETH, SECUREMENT DEVICE IN PLACE. 
OG TUBE PLACED, CONFIRMED WITH AUSCULTATION. SECOND IV START. RECTAL TEMP PROBE 
PLACED. MAYO CATHETER PLACED. ONLY KNOWN HX PER [facility] FROM WIFE IS THAT PT HAS 
HAD SZ IN PAST. NO KNOWN ALLERGIES OR MEDS AT THIS TIME.

## 2020-05-09 NOTE — NUR
PT NOT TOLERATING DECREASE IN PROPOFOL WELL. EEG LEADS COMING OFF, DESPITE 
HEADBAND CREATED BY TECH. NEUROLOGIST AWARE. PROPOFOL INCREASED PER POLICY TO 
AID PT IN COMFORT.

## 2020-05-09 NOTE — NUR
PT RELAXED IN BED, NAD NOTED AT THIS TIME. OG TUBE REMOVING COFFEE GROUND, DARK 
BROWN LIQUID. AWAITING EEG.

## 2020-05-10 VITALS — DIASTOLIC BLOOD PRESSURE: 77 MMHG | SYSTOLIC BLOOD PRESSURE: 159 MMHG

## 2020-05-10 VITALS — DIASTOLIC BLOOD PRESSURE: 97 MMHG | SYSTOLIC BLOOD PRESSURE: 155 MMHG

## 2020-05-10 LAB
ANION GAP SERPL CALC-SCNC: 8 MMOL/L (ref 5–15)
BASOPHILS # BLD AUTO: 0.01 X10^3/UL (ref 0–0.1)
BASOPHILS NFR BLD AUTO: 0 % (ref 0–1)
CALCIUM SERPL-MCNC: 8.7 MG/DL (ref 8.5–10.1)
CHLORIDE SERPL-SCNC: 106 MMOL/L (ref 98–107)
CHOL/HDL RATIO: 3.5
CREAT SERPL-MCNC: 0.82 MG/DL (ref 0.7–1.3)
EOSINOPHIL # BLD AUTO: 0 X10^3/UL (ref 0–0.4)
EOSINOPHIL NFR BLD AUTO: 0 % (ref 1–7)
ERYTHROCYTE [DISTWIDTH] IN BLOOD BY AUTOMATED COUNT: 14.4 % (ref 9.4–14.8)
HDL CHOL %: 29 % (ref 26–37)
HDL CHOLESTEROL (DIRECT): 43 MG/DL (ref 40–60)
LDL CHOLESTEROL,CALCULATED: 70 MG/DL (ref 54–169)
LDLC/HDLC SERPL: 1.6 {RATIO} (ref 0.5–3)
LYMPHOCYTES # BLD AUTO: 1 X10^3/UL (ref 1–3.4)
LYMPHOCYTES NFR BLD AUTO: 6 % (ref 22–44)
MCH RBC QN AUTO: 33.5 PG (ref 27.5–34.5)
MCHC RBC AUTO-ENTMCNC: 33.3 G/DL (ref 33.2–36.2)
MCV RBC AUTO: 100.4 FL (ref 81–97)
MD: (no result)
MONOCYTES # BLD AUTO: 0.84 X10^3/UL (ref 0.2–0.8)
MONOCYTES NFR BLD AUTO: 5 % (ref 2–9)
NEUTROPHILS # BLD AUTO: 13.94 X10^3/UL (ref 1.8–6.8)
NEUTROPHILS NFR BLD AUTO: 88 % (ref 42–75)
O2 FLOW: 2 L/MIN
PLATELET # BLD AUTO: 167 X10^3/UL (ref 130–400)
PMV BLD AUTO: 8.2 FL (ref 7.4–10.4)
RBC # BLD AUTO: 4.67 X10^6/UL (ref 4.38–5.82)
TRIGL SERPL-MCNC: 179 MG/DL (ref 50–200)
TROPONIN I SERPL-MCNC: 0.54 NG/ML (ref 0–0.04)
VLDLC SERPL CALC-MCNC: 36 MG/DL (ref 0–25)

## 2020-05-10 RX ADMIN — ESCITALOPRAM OXALATE SCH MG: 10 TABLET, FILM COATED ORAL at 10:09

## 2020-05-10 RX ADMIN — SODIUM CHLORIDE, SODIUM LACTATE, POTASSIUM CHLORIDE, AND CALCIUM CHLORIDE SCH MLS/HR: .6; .31; .03; .02 INJECTION, SOLUTION INTRAVENOUS at 06:51

## 2020-05-10 RX ADMIN — CYANOCOBALAMIN SCH MCG: 1000 INJECTION, SOLUTION INTRAMUSCULAR; SUBCUTANEOUS at 11:00

## 2020-05-10 RX ADMIN — FAMOTIDINE SCH MG: 10 INJECTION INTRAVENOUS at 22:06

## 2020-05-10 RX ADMIN — INSULIN LISPRO SCH UNITS: 100 INJECTION, SOLUTION INTRAVENOUS; SUBCUTANEOUS at 21:00

## 2020-05-10 RX ADMIN — INSULIN LISPRO SCH UNITS: 100 INJECTION, SOLUTION INTRAVENOUS; SUBCUTANEOUS at 03:00

## 2020-05-10 RX ADMIN — ATORVASTATIN CALCIUM SCH MG: 40 TABLET, FILM COATED ORAL at 22:07

## 2020-05-10 RX ADMIN — FAMOTIDINE SCH MG: 10 INJECTION INTRAVENOUS at 10:10

## 2020-05-10 RX ADMIN — ASPIRIN SCH MG: 81 TABLET, COATED ORAL at 10:10

## 2020-05-10 RX ADMIN — POTASSIUM CHLORIDE SCH MLS/HR: 2 INJECTION, SOLUTION, CONCENTRATE INTRAVENOUS at 15:48

## 2020-05-10 RX ADMIN — SODIUM CHLORIDE, PRESERVATIVE FREE SCH ML: 5 INJECTION INTRAVENOUS at 21:00

## 2020-05-10 RX ADMIN — CARBAMAZEPINE SCH MG: 200 TABLET ORAL at 23:00

## 2020-05-10 RX ADMIN — ENOXAPARIN SODIUM SCH MG: 40 INJECTION SUBCUTANEOUS at 22:06

## 2020-05-10 RX ADMIN — AZITHROMYCIN FOR INJECTION INJECTION, POWDER, LYOPHILIZED, FOR SOLUTION SCH MLS/HR: 500 INJECTION INTRAVENOUS at 23:00

## 2020-05-10 RX ADMIN — ONDANSETRON PRN MG: 2 INJECTION, SOLUTION INTRAMUSCULAR; INTRAVENOUS at 17:09

## 2020-05-10 RX ADMIN — ONDANSETRON PRN MG: 2 INJECTION, SOLUTION INTRAMUSCULAR; INTRAVENOUS at 10:15

## 2020-05-10 RX ADMIN — INSULIN LISPRO SCH UNITS: 100 INJECTION, SOLUTION INTRAVENOUS; SUBCUTANEOUS at 15:00

## 2020-05-10 RX ADMIN — INSULIN LISPRO SCH UNITS: 100 INJECTION, SOLUTION INTRAVENOUS; SUBCUTANEOUS at 08:21

## 2020-05-10 RX ADMIN — DIBASIC SODIUM PHOSPHATE, MONOBASIC POTASSIUM PHOSPHATE AND MONOBASIC SODIUM PHOSPHATE SCH MG: 852; 155; 130 TABLET ORAL at 10:10

## 2020-05-10 RX ADMIN — SODIUM CHLORIDE, PRESERVATIVE FREE SCH ML: 5 INJECTION INTRAVENOUS at 09:00

## 2020-05-10 RX ADMIN — DIBASIC SODIUM PHOSPHATE, MONOBASIC POTASSIUM PHOSPHATE AND MONOBASIC SODIUM PHOSPHATE SCH MG: 852; 155; 130 TABLET ORAL at 22:07

## 2020-05-10 RX ADMIN — DOCUSATE SODIUM 50MG AND SENNOSIDES 8.6MG SCH TAB: 8.6; 5 TABLET, FILM COATED ORAL at 09:00

## 2020-05-10 RX ADMIN — ONDANSETRON PRN MG: 2 INJECTION, SOLUTION INTRAMUSCULAR; INTRAVENOUS at 02:55

## 2020-05-10 NOTE — NUR
Rec: Groud/thin liquids once cleared by MD for diet advancement. Do not anticipate need for 
SLP intervention at time of discharge, so recommend return to previous living situation. 

-------------------------------------------------------------------------------

Addendum: 05/10/20 at 1354 by Kelly SANTANA

-------------------------------------------------------------------------------

Amended: Links added.

## 2020-05-11 VITALS — SYSTOLIC BLOOD PRESSURE: 168 MMHG | DIASTOLIC BLOOD PRESSURE: 95 MMHG

## 2020-05-11 VITALS — SYSTOLIC BLOOD PRESSURE: 151 MMHG | DIASTOLIC BLOOD PRESSURE: 90 MMHG

## 2020-05-11 LAB
ANION GAP SERPL CALC-SCNC: 9 MMOL/L (ref 5–15)
BASOPHILS # BLD AUTO: 0.03 X10^3/UL (ref 0–0.1)
BASOPHILS NFR BLD AUTO: 0 % (ref 0–1)
CALCIUM SERPL-MCNC: 8.4 MG/DL (ref 8.5–10.1)
CHLORIDE SERPL-SCNC: 106 MMOL/L (ref 98–107)
CREAT SERPL-MCNC: 0.83 MG/DL (ref 0.7–1.3)
EOSINOPHIL # BLD AUTO: 0 X10^3/UL (ref 0–0.4)
EOSINOPHIL NFR BLD AUTO: 0 % (ref 1–7)
ERYTHROCYTE [DISTWIDTH] IN BLOOD BY AUTOMATED COUNT: 13.9 % (ref 9.4–14.8)
LYMPHOCYTES # BLD AUTO: 1.41 X10^3/UL (ref 1–3.4)
LYMPHOCYTES NFR BLD AUTO: 16 % (ref 22–44)
MCH RBC QN AUTO: 34 PG (ref 27.5–34.5)
MCHC RBC AUTO-ENTMCNC: 34.1 G/DL (ref 33.2–36.2)
MCV RBC AUTO: 99.9 FL (ref 81–97)
MD: NO
MONOCYTES # BLD AUTO: 0.72 X10^3/UL (ref 0.2–0.8)
MONOCYTES NFR BLD AUTO: 8 % (ref 2–9)
NEUTROPHILS # BLD AUTO: 6.71 X10^3/UL (ref 1.8–6.8)
NEUTROPHILS NFR BLD AUTO: 76 % (ref 42–75)
PLATELET # BLD AUTO: 144 X10^3/UL (ref 130–400)
PMV BLD AUTO: 7.8 FL (ref 7.4–10.4)
RBC # BLD AUTO: 4.44 X10^6/UL (ref 4.38–5.82)

## 2020-05-11 RX ADMIN — CYANOCOBALAMIN SCH MCG: 1000 INJECTION, SOLUTION INTRAMUSCULAR; SUBCUTANEOUS at 11:37

## 2020-05-11 RX ADMIN — DOCUSATE SODIUM 50MG AND SENNOSIDES 8.6MG SCH TAB: 8.6; 5 TABLET, FILM COATED ORAL at 09:00

## 2020-05-11 RX ADMIN — DIBASIC SODIUM PHOSPHATE, MONOBASIC POTASSIUM PHOSPHATE AND MONOBASIC SODIUM PHOSPHATE SCH MG: 852; 155; 130 TABLET ORAL at 10:21

## 2020-05-11 RX ADMIN — ASPIRIN SCH MG: 81 TABLET, COATED ORAL at 04:17

## 2020-05-11 RX ADMIN — SODIUM CHLORIDE, PRESERVATIVE FREE SCH ML: 5 INJECTION INTRAVENOUS at 10:23

## 2020-05-11 RX ADMIN — CARBAMAZEPINE SCH MG: 200 TABLET ORAL at 11:36

## 2020-05-11 RX ADMIN — FAMOTIDINE SCH MG: 10 INJECTION INTRAVENOUS at 10:21

## 2020-05-11 RX ADMIN — INSULIN LISPRO SCH UNITS: 100 INJECTION, SOLUTION INTRAVENOUS; SUBCUTANEOUS at 09:00

## 2020-05-11 RX ADMIN — ESCITALOPRAM OXALATE SCH MG: 10 TABLET, FILM COATED ORAL at 10:21

## 2020-05-11 RX ADMIN — POTASSIUM CHLORIDE SCH MLS/HR: 2 INJECTION, SOLUTION, CONCENTRATE INTRAVENOUS at 11:38

## 2020-05-11 RX ADMIN — INSULIN LISPRO SCH UNITS: 100 INJECTION, SOLUTION INTRAVENOUS; SUBCUTANEOUS at 03:00

## 2020-07-08 ENCOUNTER — OFFICE VISIT (OUTPATIENT)
Dept: URGENT CARE | Facility: CLINIC | Age: 65
End: 2020-07-08
Payer: MEDICARE

## 2020-07-08 ENCOUNTER — APPOINTMENT (OUTPATIENT)
Dept: RADIOLOGY | Facility: MEDICAL CENTER | Age: 65
DRG: 918 | End: 2020-07-08
Attending: EMERGENCY MEDICINE
Payer: MEDICARE

## 2020-07-08 ENCOUNTER — HOSPITAL ENCOUNTER (INPATIENT)
Facility: MEDICAL CENTER | Age: 65
LOS: 1 days | DRG: 918 | End: 2020-07-10
Attending: EMERGENCY MEDICINE | Admitting: HOSPITALIST
Payer: MEDICARE

## 2020-07-08 VITALS
TEMPERATURE: 97.6 F | DIASTOLIC BLOOD PRESSURE: 78 MMHG | SYSTOLIC BLOOD PRESSURE: 136 MMHG | OXYGEN SATURATION: 99 % | RESPIRATION RATE: 18 BRPM | HEART RATE: 71 BPM

## 2020-07-08 DIAGNOSIS — R11.10 INTRACTABLE VOMITING, PRESENCE OF NAUSEA NOT SPECIFIED, UNSPECIFIED VOMITING TYPE: ICD-10-CM

## 2020-07-08 DIAGNOSIS — Z87.898 HISTORY OF SEIZURE: ICD-10-CM

## 2020-07-08 DIAGNOSIS — R41.82 ALTERED MENTAL STATUS, UNSPECIFIED ALTERED MENTAL STATUS TYPE: ICD-10-CM

## 2020-07-08 PROBLEM — G93.40 ACUTE ENCEPHALOPATHY: Status: ACTIVE | Noted: 2020-07-08

## 2020-07-08 LAB
ALBUMIN SERPL BCP-MCNC: 4.5 G/DL (ref 3.2–4.9)
ALBUMIN/GLOB SERPL: 1.6 G/DL
ALP SERPL-CCNC: 149 U/L (ref 30–99)
ALT SERPL-CCNC: 13 U/L (ref 2–50)
ANION GAP SERPL CALC-SCNC: 20 MMOL/L (ref 7–16)
APPEARANCE UR: CLEAR
AST SERPL-CCNC: 16 U/L (ref 12–45)
BACTERIA #/AREA URNS HPF: NEGATIVE /HPF
BASOPHILS # BLD AUTO: 0.2 % (ref 0–1.8)
BASOPHILS # BLD: 0.03 K/UL (ref 0–0.12)
BILIRUB SERPL-MCNC: 1.2 MG/DL (ref 0.1–1.5)
BILIRUB UR QL STRIP.AUTO: ABNORMAL
BUN SERPL-MCNC: 14 MG/DL (ref 8–22)
CALCIUM SERPL-MCNC: 9.3 MG/DL (ref 8.4–10.2)
CASTS URNS QL MICRO: ABNORMAL /LPF
CHLORIDE SERPL-SCNC: 95 MMOL/L (ref 96–112)
CO2 SERPL-SCNC: 22 MMOL/L (ref 20–33)
COLOR UR: YELLOW
CREAT SERPL-MCNC: 1.22 MG/DL (ref 0.5–1.4)
EKG IMPRESSION: NORMAL
EOSINOPHIL # BLD AUTO: 0 K/UL (ref 0–0.51)
EOSINOPHIL NFR BLD: 0 % (ref 0–6.9)
EPI CELLS #/AREA URNS HPF: ABNORMAL /HPF
ERYTHROCYTE [DISTWIDTH] IN BLOOD BY AUTOMATED COUNT: 40 FL (ref 35.9–50)
GLOBULIN SER CALC-MCNC: 2.8 G/DL (ref 1.9–3.5)
GLUCOSE SERPL-MCNC: 164 MG/DL (ref 65–99)
GLUCOSE UR STRIP.AUTO-MCNC: NEGATIVE MG/DL
HCT VFR BLD AUTO: 46.8 % (ref 42–52)
HGB BLD-MCNC: 16.8 G/DL (ref 14–18)
IMM GRANULOCYTES # BLD AUTO: 0.1 K/UL (ref 0–0.11)
IMM GRANULOCYTES NFR BLD AUTO: 0.7 % (ref 0–0.9)
KETONES UR STRIP.AUTO-MCNC: 15 MG/DL
LEUKOCYTE ESTERASE UR QL STRIP.AUTO: NEGATIVE
LYMPHOCYTES # BLD AUTO: 1.28 K/UL (ref 1–4.8)
LYMPHOCYTES NFR BLD: 8.7 % (ref 22–41)
MCH RBC QN AUTO: 31.6 PG (ref 27–33)
MCHC RBC AUTO-ENTMCNC: 35.9 G/DL (ref 33.7–35.3)
MCV RBC AUTO: 88.1 FL (ref 81.4–97.8)
MICRO URNS: ABNORMAL
MONOCYTES # BLD AUTO: 1.02 K/UL (ref 0–0.85)
MONOCYTES NFR BLD AUTO: 7 % (ref 0–13.4)
MUCOUS THREADS #/AREA URNS HPF: ABNORMAL /HPF
NEUTROPHILS # BLD AUTO: 12.2 K/UL (ref 1.82–7.42)
NEUTROPHILS NFR BLD: 83.4 % (ref 44–72)
NITRITE UR QL STRIP.AUTO: NEGATIVE
NRBC # BLD AUTO: 0 K/UL
NRBC BLD-RTO: 0 /100 WBC
PH UR STRIP.AUTO: 5.5 [PH] (ref 5–8)
PHOSPHATE SERPL-MCNC: 0.6 MG/DL (ref 2.5–4.5)
PHOSPHATE SERPL-MCNC: 1.7 MG/DL (ref 2.5–4.5)
PHOSPHATE SERPL-MCNC: 5 MG/DL (ref 2.5–4.5)
PLATELET # BLD AUTO: 206 K/UL (ref 164–446)
PMV BLD AUTO: 9.6 FL (ref 9–12.9)
POTASSIUM SERPL-SCNC: 2.6 MMOL/L (ref 3.6–5.5)
POTASSIUM SERPL-SCNC: 2.9 MMOL/L (ref 3.6–5.5)
POTASSIUM SERPL-SCNC: 3 MMOL/L (ref 3.6–5.5)
PROT SERPL-MCNC: 7.3 G/DL (ref 6–8.2)
PROT UR QL STRIP: 30 MG/DL
RBC # BLD AUTO: 5.31 M/UL (ref 4.7–6.1)
RBC # URNS HPF: ABNORMAL /HPF
RBC UR QL AUTO: NEGATIVE
SODIUM SERPL-SCNC: 137 MMOL/L (ref 135–145)
SP GR UR REFRACTOMETRY: 1.02
WBC # BLD AUTO: 14.6 K/UL (ref 4.8–10.8)
WBC #/AREA URNS HPF: ABNORMAL /HPF

## 2020-07-08 PROCEDURE — G0378 HOSPITAL OBSERVATION PER HR: HCPCS

## 2020-07-08 PROCEDURE — 700101 HCHG RX REV CODE 250: Performed by: HOSPITALIST

## 2020-07-08 PROCEDURE — 84132 ASSAY OF SERUM POTASSIUM: CPT | Mod: 91

## 2020-07-08 PROCEDURE — 96374 THER/PROPH/DIAG INJ IV PUSH: CPT

## 2020-07-08 PROCEDURE — 80156 ASSAY CARBAMAZEPINE TOTAL: CPT

## 2020-07-08 PROCEDURE — 700111 HCHG RX REV CODE 636 W/ 250 OVERRIDE (IP): Performed by: HOSPITALIST

## 2020-07-08 PROCEDURE — 99285 EMERGENCY DEPT VISIT HI MDM: CPT

## 2020-07-08 PROCEDURE — 96372 THER/PROPH/DIAG INJ SC/IM: CPT

## 2020-07-08 PROCEDURE — 93005 ELECTROCARDIOGRAM TRACING: CPT | Performed by: EMERGENCY MEDICINE

## 2020-07-08 PROCEDURE — 99220 PR INITIAL OBSERVATION CARE,LEVL III: CPT | Mod: AI | Performed by: HOSPITALIST

## 2020-07-08 PROCEDURE — 700102 HCHG RX REV CODE 250 W/ 637 OVERRIDE(OP): Performed by: HOSPITALIST

## 2020-07-08 PROCEDURE — 700105 HCHG RX REV CODE 258: Performed by: EMERGENCY MEDICINE

## 2020-07-08 PROCEDURE — 84100 ASSAY OF PHOSPHORUS: CPT | Mod: 91

## 2020-07-08 PROCEDURE — A9270 NON-COVERED ITEM OR SERVICE: HCPCS | Performed by: HOSPITALIST

## 2020-07-08 PROCEDURE — 700111 HCHG RX REV CODE 636 W/ 250 OVERRIDE (IP): Performed by: EMERGENCY MEDICINE

## 2020-07-08 PROCEDURE — 700105 HCHG RX REV CODE 258: Performed by: HOSPITALIST

## 2020-07-08 PROCEDURE — 93005 ELECTROCARDIOGRAM TRACING: CPT

## 2020-07-08 PROCEDURE — 700101 HCHG RX REV CODE 250

## 2020-07-08 PROCEDURE — 70450 CT HEAD/BRAIN W/O DYE: CPT

## 2020-07-08 PROCEDURE — 36415 COLL VENOUS BLD VENIPUNCTURE: CPT

## 2020-07-08 PROCEDURE — 96375 TX/PRO/DX INJ NEW DRUG ADDON: CPT

## 2020-07-08 PROCEDURE — 99213 OFFICE O/P EST LOW 20 MIN: CPT | Performed by: NURSE PRACTITIONER

## 2020-07-08 PROCEDURE — 85025 COMPLETE CBC W/AUTO DIFF WBC: CPT

## 2020-07-08 PROCEDURE — 81001 URINALYSIS AUTO W/SCOPE: CPT

## 2020-07-08 PROCEDURE — 80053 COMPREHEN METABOLIC PANEL: CPT

## 2020-07-08 RX ORDER — LORAZEPAM 2 MG/ML
1 INJECTION INTRAMUSCULAR ONCE
Status: COMPLETED | OUTPATIENT
Start: 2020-07-08 | End: 2020-07-08

## 2020-07-08 RX ORDER — SODIUM CHLORIDE 9 MG/ML
1000 INJECTION, SOLUTION INTRAVENOUS ONCE
Status: COMPLETED | OUTPATIENT
Start: 2020-07-08 | End: 2020-07-08

## 2020-07-08 RX ORDER — LABETALOL HYDROCHLORIDE 5 MG/ML
10 INJECTION, SOLUTION INTRAVENOUS EVERY 4 HOURS PRN
Status: DISCONTINUED | OUTPATIENT
Start: 2020-07-08 | End: 2020-07-10 | Stop reason: HOSPADM

## 2020-07-08 RX ORDER — BISACODYL 10 MG
10 SUPPOSITORY, RECTAL RECTAL
Status: DISCONTINUED | OUTPATIENT
Start: 2020-07-08 | End: 2020-07-10 | Stop reason: HOSPADM

## 2020-07-08 RX ORDER — HYDROMORPHONE HYDROCHLORIDE 1 MG/ML
0.25 INJECTION, SOLUTION INTRAMUSCULAR; INTRAVENOUS; SUBCUTANEOUS
Status: DISCONTINUED | OUTPATIENT
Start: 2020-07-08 | End: 2020-07-10 | Stop reason: HOSPADM

## 2020-07-08 RX ORDER — ESCITALOPRAM OXALATE 10 MG/1
20 TABLET ORAL EVERY EVENING
Status: DISCONTINUED | OUTPATIENT
Start: 2020-07-08 | End: 2020-07-10 | Stop reason: HOSPADM

## 2020-07-08 RX ORDER — OXYCODONE HYDROCHLORIDE 5 MG/1
2.5 TABLET ORAL
Status: DISCONTINUED | OUTPATIENT
Start: 2020-07-08 | End: 2020-07-10 | Stop reason: HOSPADM

## 2020-07-08 RX ORDER — ONDANSETRON 2 MG/ML
4 INJECTION INTRAMUSCULAR; INTRAVENOUS EVERY 4 HOURS PRN
Status: DISCONTINUED | OUTPATIENT
Start: 2020-07-08 | End: 2020-07-10 | Stop reason: HOSPADM

## 2020-07-08 RX ORDER — CARBAMAZEPINE 200 MG/1
400 TABLET ORAL EVERY EVENING
Status: DISCONTINUED | OUTPATIENT
Start: 2020-07-08 | End: 2020-07-10 | Stop reason: HOSPADM

## 2020-07-08 RX ORDER — LORAZEPAM 2 MG/ML
.5-1 INJECTION INTRAMUSCULAR
Status: DISCONTINUED | OUTPATIENT
Start: 2020-07-08 | End: 2020-07-10 | Stop reason: HOSPADM

## 2020-07-08 RX ORDER — ONDANSETRON 2 MG/ML
4 INJECTION INTRAMUSCULAR; INTRAVENOUS ONCE
Status: COMPLETED | OUTPATIENT
Start: 2020-07-08 | End: 2020-07-08

## 2020-07-08 RX ORDER — HYDRALAZINE HYDROCHLORIDE 10 MG/1
5 TABLET, FILM COATED ORAL EVERY 6 HOURS PRN
Status: DISCONTINUED | OUTPATIENT
Start: 2020-07-08 | End: 2020-07-10 | Stop reason: HOSPADM

## 2020-07-08 RX ORDER — QUETIAPINE FUMARATE 25 MG/1
25 TABLET, FILM COATED ORAL EVERY EVENING
Status: DISCONTINUED | OUTPATIENT
Start: 2020-07-08 | End: 2020-07-10 | Stop reason: HOSPADM

## 2020-07-08 RX ORDER — AMOXICILLIN 250 MG
2 CAPSULE ORAL 2 TIMES DAILY
Status: DISCONTINUED | OUTPATIENT
Start: 2020-07-08 | End: 2020-07-10 | Stop reason: HOSPADM

## 2020-07-08 RX ORDER — ACETAMINOPHEN 325 MG/1
650 TABLET ORAL EVERY 6 HOURS PRN
Status: DISCONTINUED | OUTPATIENT
Start: 2020-07-08 | End: 2020-07-10 | Stop reason: HOSPADM

## 2020-07-08 RX ORDER — HYDRALAZINE HYDROCHLORIDE 20 MG/ML
20 INJECTION INTRAMUSCULAR; INTRAVENOUS EVERY 6 HOURS PRN
Status: DISCONTINUED | OUTPATIENT
Start: 2020-07-08 | End: 2020-07-10 | Stop reason: HOSPADM

## 2020-07-08 RX ORDER — SODIUM CHLORIDE AND POTASSIUM CHLORIDE 300; 900 MG/100ML; MG/100ML
INJECTION, SOLUTION INTRAVENOUS ONCE
Status: COMPLETED | OUTPATIENT
Start: 2020-07-08 | End: 2020-07-08

## 2020-07-08 RX ORDER — POLYETHYLENE GLYCOL 3350 17 G/17G
1 POWDER, FOR SOLUTION ORAL
Status: DISCONTINUED | OUTPATIENT
Start: 2020-07-08 | End: 2020-07-10 | Stop reason: HOSPADM

## 2020-07-08 RX ORDER — POTASSIUM CHLORIDE 7.45 MG/ML
10 INJECTION INTRAVENOUS
Status: DISCONTINUED | OUTPATIENT
Start: 2020-07-08 | End: 2020-07-08

## 2020-07-08 RX ORDER — OXYCODONE HYDROCHLORIDE 5 MG/1
5 TABLET ORAL
Status: DISCONTINUED | OUTPATIENT
Start: 2020-07-08 | End: 2020-07-10 | Stop reason: HOSPADM

## 2020-07-08 RX ORDER — ATORVASTATIN CALCIUM 40 MG/1
40 TABLET, FILM COATED ORAL NIGHTLY
Status: DISCONTINUED | OUTPATIENT
Start: 2020-07-08 | End: 2020-07-10 | Stop reason: HOSPADM

## 2020-07-08 RX ORDER — ONDANSETRON 4 MG/1
4 TABLET, ORALLY DISINTEGRATING ORAL EVERY 4 HOURS PRN
Status: DISCONTINUED | OUTPATIENT
Start: 2020-07-08 | End: 2020-07-10 | Stop reason: HOSPADM

## 2020-07-08 RX ADMIN — CARBAMAZEPINE 400 MG: 200 TABLET ORAL at 20:27

## 2020-07-08 RX ADMIN — ONDANSETRON 4 MG: 2 INJECTION INTRAMUSCULAR; INTRAVENOUS at 14:27

## 2020-07-08 RX ADMIN — QUETIAPINE FUMARATE 25 MG: 25 TABLET ORAL at 20:27

## 2020-07-08 RX ADMIN — LORAZEPAM 1 MG: 2 INJECTION INTRAMUSCULAR; INTRAVENOUS at 14:27

## 2020-07-08 RX ADMIN — ESCITALOPRAM OXALATE 20 MG: 10 TABLET ORAL at 20:26

## 2020-07-08 RX ADMIN — POTASSIUM PHOSPHATE, MONOBASIC AND POTASSIUM PHOSPHATE, DIBASIC 30 MMOL: 224; 236 INJECTION, SOLUTION, CONCENTRATE INTRAVENOUS at 19:54

## 2020-07-08 RX ADMIN — ENOXAPARIN SODIUM 40 MG: 40 INJECTION SUBCUTANEOUS at 18:36

## 2020-07-08 RX ADMIN — ONDANSETRON 4 MG: 2 INJECTION INTRAMUSCULAR; INTRAVENOUS at 18:35

## 2020-07-08 RX ADMIN — ATORVASTATIN CALCIUM 40 MG: 40 TABLET, FILM COATED ORAL at 20:15

## 2020-07-08 RX ADMIN — SODIUM CHLORIDE 1000 ML: 9 INJECTION, SOLUTION INTRAVENOUS at 14:27

## 2020-07-08 RX ADMIN — DIBASIC SODIUM PHOSPHATE, MONOBASIC POTASSIUM PHOSPHATE AND MONOBASIC SODIUM PHOSPHATE 2 TABLET: 852; 155; 130 TABLET ORAL at 20:15

## 2020-07-08 RX ADMIN — POTASSIUM CHLORIDE AND SODIUM CHLORIDE: 900; 300 INJECTION, SOLUTION INTRAVENOUS at 15:53

## 2020-07-08 SDOH — ECONOMIC STABILITY: FOOD INSECURITY: WITHIN THE PAST 12 MONTHS, YOU WORRIED THAT YOUR FOOD WOULD RUN OUT BEFORE YOU GOT MONEY TO BUY MORE.: PATIENT DECLINED

## 2020-07-08 SDOH — ECONOMIC STABILITY: TRANSPORTATION INSECURITY
IN THE PAST 12 MONTHS, HAS THE LACK OF TRANSPORTATION KEPT YOU FROM MEDICAL APPOINTMENTS OR FROM GETTING MEDICATIONS?: PATIENT DECLINED

## 2020-07-08 SDOH — ECONOMIC STABILITY: FOOD INSECURITY: WITHIN THE PAST 12 MONTHS, THE FOOD YOU BOUGHT JUST DIDN'T LAST AND YOU DIDN'T HAVE MONEY TO GET MORE.: PATIENT DECLINED

## 2020-07-08 SDOH — ECONOMIC STABILITY: TRANSPORTATION INSECURITY
IN THE PAST 12 MONTHS, HAS LACK OF TRANSPORTATION KEPT YOU FROM MEETINGS, WORK, OR FROM GETTING THINGS NEEDED FOR DAILY LIVING?: PATIENT DECLINED

## 2020-07-08 ASSESSMENT — ENCOUNTER SYMPTOMS
TREMORS: 1
BRUISES/BLEEDS EASILY: 0
LOSS OF CONSCIOUSNESS: 1
MEMORY LOSS: 1
MYALGIAS: 0
DIZZINESS: 1
CHILLS: 0
CONSTITUTIONAL NEGATIVE: 1
EYE REDNESS: 0
FLANK PAIN: 0
BLOOD IN STOOL: 0
STRIDOR: 0
FOCAL WEAKNESS: 0
SEIZURES: 1
SEIZURES: 1
DIARRHEA: 0
FEVER: 0
CARDIOVASCULAR NEGATIVE: 1
COUGH: 0
EYE DISCHARGE: 0
RESPIRATORY NEGATIVE: 1
NERVOUS/ANXIOUS: 0
SHORTNESS OF BREATH: 0
SHORTNESS OF BREATH: 0
SPEECH CHANGE: 0
HEADACHES: 1
ABDOMINAL PAIN: 0
VOMITING: 1
VOMITING: 1
EYE PAIN: 0
PALPITATIONS: 0

## 2020-07-08 ASSESSMENT — LIFESTYLE VARIABLES
TOTAL SCORE: 0
TOTAL SCORE: 0
ALCOHOL_USE: NO
TOTAL SCORE: 0
ALCOHOL_USE: NO
AVERAGE NUMBER OF DAYS PER WEEK YOU HAVE A DRINK CONTAINING ALCOHOL: 0
HOW MANY TIMES IN THE PAST YEAR HAVE YOU HAD 5 OR MORE DRINKS IN A DAY: 0
ON A TYPICAL DAY WHEN YOU DRINK ALCOHOL HOW MANY DRINKS DO YOU HAVE: 0
EVER_SMOKED: YES
ON A TYPICAL DAY WHEN YOU DRINK ALCOHOL HOW MANY DRINKS DO YOU HAVE: 0
TOTAL SCORE: 0
CONSUMPTION TOTAL: NEGATIVE
EVER FELT BAD OR GUILTY ABOUT YOUR DRINKING: NO
HAVE YOU EVER FELT YOU SHOULD CUT DOWN ON YOUR DRINKING: NO
HOW MANY TIMES IN THE PAST YEAR HAVE YOU HAD 5 OR MORE DRINKS IN A DAY: 0
TOTAL SCORE: 0
AVERAGE NUMBER OF DAYS PER WEEK YOU HAVE A DRINK CONTAINING ALCOHOL: 0
TOTAL SCORE: 0
EVER HAD A DRINK FIRST THING IN THE MORNING TO STEADY YOUR NERVES TO GET RID OF A HANGOVER: NO
HAVE PEOPLE ANNOYED YOU BY CRITICIZING YOUR DRINKING: NO
EVER HAD A DRINK FIRST THING IN THE MORNING TO STEADY YOUR NERVES TO GET RID OF A HANGOVER: NO
HAVE YOU EVER FELT YOU SHOULD CUT DOWN ON YOUR DRINKING: NO
HAVE PEOPLE ANNOYED YOU BY CRITICIZING YOUR DRINKING: NO
CONSUMPTION TOTAL: NEGATIVE
EVER FELT BAD OR GUILTY ABOUT YOUR DRINKING: NO

## 2020-07-08 ASSESSMENT — COPD QUESTIONNAIRES
HAVE YOU SMOKED AT LEAST 100 CIGARETTES IN YOUR ENTIRE LIFE: NO/DON'T KNOW
DURING THE PAST 4 WEEKS HOW MUCH DID YOU FEEL SHORT OF BREATH: NONE/LITTLE OF THE TIME
HAVE YOU SMOKED AT LEAST 100 CIGARETTES IN YOUR ENTIRE LIFE: YES
IN THE PAST 12 MONTHS DO YOU DO LESS THAN YOU USED TO BECAUSE OF YOUR BREATHING PROBLEMS: DISAGREE/UNSURE
DO YOU EVER COUGH UP ANY MUCUS OR PHLEGM?: NO/ONLY WITH OCCASIONAL COLDS OR INFECTIONS
DO YOU EVER COUGH UP ANY MUCUS OR PHLEGM?: YES, EVERY DAY
DURING THE PAST 4 WEEKS HOW MUCH DID YOU FEEL SHORT OF BREATH: NONE/LITTLE OF THE TIME
COPD SCREENING SCORE: 6
COPD SCREENING SCORE: 2
IN THE PAST 12 MONTHS DO YOU DO LESS THAN YOU USED TO BECAUSE OF YOUR BREATHING PROBLEMS: DISAGREE/UNSURE

## 2020-07-08 ASSESSMENT — FIBROSIS 4 INDEX
FIB4 SCORE: 1.42
FIB4 SCORE: 1.42

## 2020-07-08 ASSESSMENT — COGNITIVE AND FUNCTIONAL STATUS - GENERAL
PERSONAL GROOMING: A LITTLE
TOILETING: A LITTLE
EATING MEALS: A LITTLE
WALKING IN HOSPITAL ROOM: A LITTLE
MOVING FROM LYING ON BACK TO SITTING ON SIDE OF FLAT BED: A LITTLE
SUGGESTED CMS G CODE MODIFIER DAILY ACTIVITY: CH
DRESSING REGULAR LOWER BODY CLOTHING: A LITTLE
SUGGESTED CMS G CODE MODIFIER DAILY ACTIVITY: CK
TURNING FROM BACK TO SIDE WHILE IN FLAT BAD: A LITTLE
DAILY ACTIVITIY SCORE: 18
CLIMB 3 TO 5 STEPS WITH RAILING: A LITTLE
HELP NEEDED FOR BATHING: A LITTLE
SUGGESTED CMS G CODE MODIFIER MOBILITY: CH
STANDING UP FROM CHAIR USING ARMS: A LITTLE
SUGGESTED CMS G CODE MODIFIER MOBILITY: CK
MOBILITY SCORE: 24
DRESSING REGULAR UPPER BODY CLOTHING: A LITTLE
DAILY ACTIVITIY SCORE: 24
MOBILITY SCORE: 18
MOVING TO AND FROM BED TO CHAIR: A LITTLE

## 2020-07-08 ASSESSMENT — PATIENT HEALTH QUESTIONNAIRE - PHQ9
1. LITTLE INTEREST OR PLEASURE IN DOING THINGS: NOT AT ALL
SUM OF ALL RESPONSES TO PHQ9 QUESTIONS 1 AND 2: 0
1. LITTLE INTEREST OR PLEASURE IN DOING THINGS: NOT AT ALL
SUM OF ALL RESPONSES TO PHQ9 QUESTIONS 1 AND 2: 0
2. FEELING DOWN, DEPRESSED, IRRITABLE, OR HOPELESS: NOT AT ALL
2. FEELING DOWN, DEPRESSED, IRRITABLE, OR HOPELESS: NOT AT ALL

## 2020-07-08 NOTE — ASSESSMENT & PLAN NOTE
History of chronic seizure disorder on Tegretol.  S/p left hippocampus removal  ~ 5 years   Last seizure Sep 2020   Likely made worse by electrolyte disturbances.  Will check carbamazepine level  Resume home carbamazepine, as needed lorazepam for seizures

## 2020-07-08 NOTE — ED PROVIDER NOTES
ED Provider Note    CHIEF COMPLAINT  Chief Complaint   Patient presents with   • N/V     pt has had n/v episodes weekly or every other week since nov. after vomiting episodes pt gets confused.    • Altered Mental Status     confusion following vomiting episodes. pt is A&Ox2       HPI  Roby Viramontes II is a 64 y.o. male who presents with confusion and nausea and vomiting.  The patient presents via EMS.  He states he not know why he is in the emergency department.  He states he does not feel well.  He has nausea and states he is very thirsty.  He states he is extremely confused.  He does not have a headache.  Does not remember any recent fevers.  In reviewing his records he had a similar event in the fall and they felt it was from electrolyte imbalance.  There is also some record of a prior seizure disorder.  Otherwise no further history could be obtained due to the patient's altered state.    REVIEW OF SYSTEMS  See HPI for further details.  Unobtainable secondary to the patient's altered mental status    PAST MEDICAL HISTORY  No past medical history on file.    FAMILY HISTORY  [unfilled]    SOCIAL HISTORY  Social History     Socioeconomic History   • Marital status:      Spouse name: Not on file   • Number of children: Not on file   • Years of education: Not on file   • Highest education level: Not on file   Occupational History   • Not on file   Social Needs   • Financial resource strain: Not on file   • Food insecurity     Worry: Not on file     Inability: Not on file   • Transportation needs     Medical: Not on file     Non-medical: Not on file   Tobacco Use   • Smoking status: Former Smoker     Types: Cigarettes   • Smokeless tobacco: Never Used   Substance and Sexual Activity   • Alcohol use: Not Currently   • Drug use: Yes     Types: Marijuana, Inhaled     Comment: marijuana   • Sexual activity: Not on file   Lifestyle   • Physical activity     Days per week: Not on file     Minutes per session: Not  on file   • Stress: Not on file   Relationships   • Social connections     Talks on phone: Not on file     Gets together: Not on file     Attends L[employer] service: Not on file     Active member of club or organization: Not on file     Attends meetings of clubs or organizations: Not on file     Relationship status: Not on file   • Intimate partner violence     Fear of current or ex partner: Not on file     Emotionally abused: Not on file     Physically abused: Not on file     Forced sexual activity: Not on file   Other Topics Concern   • Not on file   Social History Narrative   • Not on file       SURGICAL HISTORY  No past surgical history on file.    CURRENT MEDICATIONS  Home Medications    **Home medications have not yet been reviewed for this encounter**         ALLERGIES  No Known Allergies    PHYSICAL EXAM  VITAL SIGNS: BP (!) 203/99   Pulse 61   Temp 37.1 °C (98.7 °F) (Oral)   Resp 18   SpO2 99%       Constitutional: In distress.   HENT: Normocephalic, Atraumatic, Bilateral external ears normal, Oropharynx moist, No oral exudates, Nose normal.   Eyes: PERRLA, EOMI, Conjunctiva normal, No discharge.   Neck: Normal range of motion, No tenderness, Supple, No stridor.   Lymphatic: No lymphadenopathy noted.   Cardiovascular: Normal heart rate, Normal rhythm, No murmurs, No rubs, No gallops.   Thorax & Lungs: Normal breath sounds, No respiratory distress, No wheezing, No chest tenderness.   Abdomen: Bowel sounds normal, Soft, No tenderness, No masses, No pulsatile masses.   Skin: Warm, Dry, No erythema, No rash.   Back: No tenderness, No CVA tenderness.   Extremities: Intact distal pulses, No edema, No tenderness, No cyanosis, No clubbing.   Musculoskeletal: Good range of motion in all major joints. No tenderness to palpation or major deformities noted.   Neurologic: Alert & oriented x 1, Normal motor function, Normal sensory function, No focal deficits noted.   Psychiatric: Affect normal, Judgment normal, Mood  normal.     RADIOLOGY/PROCEDURES  CT-HEAD W/O   Final Result      No acute intracranial abnormality is identified.      Left temporal craniotomy with underlying encephalomalacia        CT-HEAD W/O   Final Result      No acute intracranial abnormality is identified.      Left temporal craniotomy with underlying encephalomalacia            COURSE & MEDICAL DECISION MAKING  Pertinent Labs & Imaging studies reviewed. (See chart for details)  This a 64-year-old gentleman who presents with acute confusion and nausea and vomiting.  The patient's wife arrived and she states he did have a seizure this morning and therefore I suspect on presentation he was postictal.  She states is been vomiting since November and they have been unable to see a gastroenterologist.  She states that once he has severe episodes of vomiting he does have seizures due to low phosphorus.  Therefore I have added a phosphorus.  His potassium is low and he will receive 40 mEq of potassium chloride over 4 hours.  I did administer Ativan for further seizure prophylaxis and he is been resting comfortably since that time.  He has not had any further emesis.  CT scan of the head does not show any acute changes.  Will admit the patient to the hospital for further treatment.  Currently he is resting comfortably.    FINAL IMPRESSION  1.  Altered mental status  2.  Suspect secondary to postictal state  3.  Hypokalemia  4.  Recurrent emesis unclear source    Disposition  The patient will be admitted in stable condition         Electronically signed by: Kp Solares M.D., 7/8/2020 2:10 PM    Results for orders placed or performed during the hospital encounter of 07/08/20   CBC WITH DIFFERENTIAL   Result Value Ref Range    WBC 14.6 (H) 4.8 - 10.8 K/uL    RBC 5.31 4.70 - 6.10 M/uL    Hemoglobin 16.8 14.0 - 18.0 g/dL    Hematocrit 46.8 42.0 - 52.0 %    MCV 88.1 81.4 - 97.8 fL    MCH 31.6 27.0 - 33.0 pg    MCHC 35.9 (H) 33.7 - 35.3 g/dL    RDW 40.0 35.9 - 50.0  fL    Platelet Count 206 164 - 446 K/uL    MPV 9.6 9.0 - 12.9 fL    Neutrophils-Polys 83.40 (H) 44.00 - 72.00 %    Lymphocytes 8.70 (L) 22.00 - 41.00 %    Monocytes 7.00 0.00 - 13.40 %    Eosinophils 0.00 0.00 - 6.90 %    Basophils 0.20 0.00 - 1.80 %    Immature Granulocytes 0.70 0.00 - 0.90 %    Nucleated RBC 0.00 /100 WBC    Neutrophils (Absolute) 12.20 (H) 1.82 - 7.42 K/uL    Lymphs (Absolute) 1.28 1.00 - 4.80 K/uL    Monos (Absolute) 1.02 (H) 0.00 - 0.85 K/uL    Eos (Absolute) 0.00 0.00 - 0.51 K/uL    Baso (Absolute) 0.03 0.00 - 0.12 K/uL    Immature Granulocytes (abs) 0.10 0.00 - 0.11 K/uL    NRBC (Absolute) 0.00 K/uL   COMP METABOLIC PANEL   Result Value Ref Range    Sodium 137 135 - 145 mmol/L    Potassium 2.6 (LL) 3.6 - 5.5 mmol/L    Chloride 95 (L) 96 - 112 mmol/L    Co2 22 20 - 33 mmol/L    Anion Gap 20.0 (H) 7.0 - 16.0    Glucose 164 (H) 65 - 99 mg/dL    Bun 14 8 - 22 mg/dL    Creatinine 1.22 0.50 - 1.40 mg/dL    Calcium 9.3 8.4 - 10.2 mg/dL    AST(SGOT) 16 12 - 45 U/L    ALT(SGPT) 13 2 - 50 U/L    Alkaline Phosphatase 149 (H) 30 - 99 U/L    Total Bilirubin 1.2 0.1 - 1.5 mg/dL    Albumin 4.5 3.2 - 4.9 g/dL    Total Protein 7.3 6.0 - 8.2 g/dL    Globulin 2.8 1.9 - 3.5 g/dL    A-G Ratio 1.6 g/dL   URINALYSIS CULTURE, IF INDICATED    Specimen: Urine, Clean Catch; Blood   Result Value Ref Range    Color Yellow     Character Clear     Ph 5.5 5.0 - 8.0    Glucose Negative Negative mg/dL    Ketones 15 (A) Negative mg/dL    Protein 30 (A) Negative mg/dL    Bilirubin Small (A) Negative    Nitrite Negative Negative    Leukocyte Esterase Negative Negative    Occult Blood Negative Negative    Micro Urine Req Microscopic    REFRACTOMETER SG   Result Value Ref Range    Specific Gravity 1.021    URINE MICROSCOPIC (W/UA)   Result Value Ref Range    WBC 2-5 (A) /hpf    RBC 0-2 (A) /hpf    Bacteria Negative None /hpf    Epithelial Cells Few Few /hpf    Mucous Threads Few /hpf    Urine Casts 3-5 Hyaline (A) /lpf   ESTIMATED  GFR   Result Value Ref Range    GFR If African American >60 >60 mL/min/1.73 m 2    GFR If Non African American 59 (A) >60 mL/min/1.73 m 2   EKG   Result Value Ref Range    Report       [facility] Emergency Dept.    Test Date:  2020  Pt Name:    BRISEYDA CHAUHAN            Department: EDS  MRN:        [id number]                      [facility]  Gender:     Male                         Technician: KAYY  :        1955                   Requested By:ER TRIAGE PROTOCOL  Order #:    [id number]                    Reading MD: CHAI IRWIN MD    Measurements  Intervals                                Axis  Rate:       58                           P:          82  IN:         160                          QRS:        48  QRSD:       106                          T:  QT:         484  QTc:        476    Interpretive Statements  Twelve-lead EKG shows a normal sinus rhythm with a ventricular rate of 58,  normal QRS, normal intervals, no ST segment elevation or depression, normal T    waves.  Electronically Signed On 2020 15:19:11 PDT by CHAI IRWIN MD

## 2020-07-08 NOTE — ED TRIAGE NOTES
[facility] ems for above complaints.     Chief Complaint   Patient presents with   • N/V     pt has had n/v episodes weekly or every other week since nov. after vomiting episodes pt gets confused.    • Altered Mental Status     confusion following vomiting episodes. pt is A&Ox2     BP (!) 203/99   Pulse 61   Temp 37.1 °C (98.7 °F) (Oral)   Resp 18   SpO2 99%

## 2020-07-08 NOTE — H&P
Hospital Medicine History & Physical Note    Date of Service  7/8/2020    Primary Care Physician  Pcp Pt States None    Code Status  Full Code    Chief Complaint  Chief Complaint   Patient presents with   • N/V     pt has had n/v episodes weekly or every other week since nov. after vomiting episodes pt gets confused.    • Altered Mental Status     confusion following vomiting episodes. pt is A&Ox2       History of Presenting Illness  64 y.o. 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  Most of the history was taken from the wife, chart emergency department provider as the patient was encephalopathic during my interview.  Reportedly the patient had abnormal jerking movement of his extremities and loss consciousness, according to the wife this often happens when his phosphorus level is very low.  He was not eating and drinking well over 2 days prior to admission he did have some nausea and did vomit twice according to the wife.    Review of Systems  Review of Systems   Unable to perform ROS: Mental status change (Limited)   Constitutional: Positive for malaise/fatigue. Negative for chills and fever.   Eyes: Negative for pain, discharge and redness.   Respiratory: Negative for cough, shortness of breath and stridor.    Cardiovascular: Negative for chest pain, palpitations and leg swelling.   Gastrointestinal: Positive for vomiting. Negative for abdominal pain, blood in stool and diarrhea.   Genitourinary: Negative for flank pain.   Musculoskeletal: Negative for myalgias.   Skin: Negative.    Neurological: Positive for dizziness, seizures, loss of consciousness and headaches. Negative for speech change and focal weakness.   Endo/Heme/Allergies: Does not bruise/bleed easily.   Psychiatric/Behavioral: The patient  is not nervous/anxious.        Past Medical History   has a past medical history of Dyslipidemia, Hypophosphatemia, and Seizure disorder (HCC).    Surgical History  Surgical removal of the hippocampus    Family History  family history is not on file.     Social History   reports that he has quit smoking. His smoking use included cigarettes. He has never used smokeless tobacco. He reports previous alcohol use. He reports current drug use. Drugs: Marijuana and Inhaled.    Allergies  No Known Allergies    Medications  Prior to Admission Medications   Prescriptions Last Dose Informant Patient Reported? Taking?   QUEtiapine (SEROQUEL) 25 MG Tab 7/7/2020 at 2300 Rx Bottle (For Med Information) Yes No   Sig: Take 25 mg by mouth every evening.   atorvastatin (LIPITOR) 40 MG Tab 7/7/2020 at 2300 Rx Bottle (For Med Information) Yes No   Sig: Take 40 mg by mouth every evening.   carBAMazepine (TEGRETOL) 200 MG Tab 7/7/2020 at 2300 Rx Bottle (For Med Information) Yes No   Sig: Take 400 mg by mouth every evening.   escitalopram (LEXAPRO) 20 MG tablet 7/7/2020 at 2300 Rx Bottle (For Med Information) Yes No   Sig: Take 20 mg by mouth every evening.   phosphorus (K-PHOS-NEUTRAL) 155-852-130 MG tablet 7/7/2020 at 2300 Rx Bottle (For Med Information) Yes Yes   Sig: Take 2 Tabs by mouth every bedtime.      Facility-Administered Medications: None       Physical Exam  Temp:  [36.6 °C (97.8 °F)-37.1 °C (98.7 °F)] 36.6 °C (97.8 °F)  Pulse:  [61-69] 67  Resp:  [18] 18  BP: (134-203)/(62-99) 185/91  SpO2:  [88 %-99 %] 97 %    Physical Exam  Constitutional:       General: He is not in acute distress.     Appearance: He is not ill-appearing or diaphoretic.   HENT:      Head: Atraumatic.      Right Ear: External ear normal.      Left Ear: External ear normal.      Nose: No congestion or rhinorrhea.      Mouth/Throat:      Mouth: Mucous membranes are moist.   Eyes:      General: No scleral icterus.        Right eye: No discharge.         Left  eye: No discharge.      Pupils: Pupils are equal, round, and reactive to light.   Neck:      Musculoskeletal: Neck supple. No neck rigidity or muscular tenderness.   Cardiovascular:      Rate and Rhythm: Normal rate and regular rhythm.      Heart sounds: No friction rub. No gallop.    Pulmonary:      Effort: No respiratory distress.      Breath sounds: No stridor. No wheezing.   Abdominal:      Palpations: Abdomen is soft.      Tenderness: There is no abdominal tenderness. There is no guarding or rebound.   Musculoskeletal:      Right lower leg: No edema.      Left lower leg: No edema.   Skin:     Coloration: Skin is not jaundiced or pale.   Neurological:      Mental Status: He is alert.      Coordination: Coordination normal.      Comments: Oriented 2-3    Psychiatric:         Mood and Affect: Mood normal.         Behavior: Behavior normal.       Laboratory:  Recent Labs     07/08/20  1340   WBC 14.6*   RBC 5.31   HEMOGLOBIN 16.8   HEMATOCRIT 46.8   MCV 88.1   MCH 31.6   MCHC 35.9*   RDW 40.0   PLATELETCT 206   MPV 9.6     Recent Labs     07/08/20  1340   SODIUM 137   POTASSIUM 2.6*   CHLORIDE 95*   CO2 22   GLUCOSE 164*   BUN 14   CREATININE 1.22   CALCIUM 9.3     Recent Labs     07/08/20  1340   ALTSGPT 13   ASTSGOT 16   ALKPHOSPHAT 149*   TBILIRUBIN 1.2   GLUCOSE 164*         No results for input(s): NTPROBNP in the last 72 hours.      No results for input(s): TROPONINT in the last 72 hours.    Imaging:  CT-HEAD W/O   Final Result      No acute intracranial abnormality is identified.      Left temporal craniotomy with underlying encephalomalacia        I personally reviewed patient EKG it shows a sinus rhythm with a rate of 58, no ST elevation or ST depression, T wave flattening with a QTC of 476.    Assessment/Plan:    * Seizure (HCC)- (present on admission)  Assessment & Plan  History of chronic seizure disorder on Tegretol.  S/p left hippocampus removal  ~ 5 years   Last seizure Sep 2020   Likely made worse  by electrolyte disturbances.  Will check carbamazepine level  Resume home carbamazepine, as needed lorazepam for seizures    Acute encephalopathy  Assessment & Plan  Likely secondary to seizures, expect to improve with controlling his seizures    Hypokalemia- (present on admission)  Assessment & Plan  EKG shows a sinus rhythm with a rate of 58, no ST elevation or ST depression, T wave flattening with a QTC of 476.  Replacing, check magnesium   Continuous cardiac monitoring    Hypophosphatemia- (present on admission)  Assessment & Plan  Will replace, continue to monitor.  Continuous cardiac monitoring

## 2020-07-08 NOTE — PROGRESS NOTES
Subjective:     Roby Viramontes II is a 64 y.o. male who presents for Emesis (x7 months ago, emesis off and on some mornings since november, pt isn't making any sense)       Other   This is a new problem. The problem has been rapidly worsening. Associated symptoms include vomiting. Pertinent negatives include no chest pain.     Patient brought in by his wife.  Wife reports that patient has been having vomiting for the past several months.  Reports that patient is not able to keep any food or fluids down.  Symptoms worsening.  Reports increased confusion and shakiness.  Reports that patient has a history of seizures.  Reports that he was hospitalized for seizures due to low phosphorus levels a few months ago and symptoms are similar at this time.    Patient was screened prior to rooming and denied COVID-19 diagnosis or contact with a person who has been diagnosed or is suspected to have COVID-19. During this visit, appropriate PPE was worn, hand hygiene was performed, and the patient and any visitors were masked.     PMH:  has no past medical history on file.    MEDS:   Current Outpatient Medications:   •  K Phos Mono-Sod Phos Di & Mono (K-PHOS-NEUTRAL) 155-852-130 MG Tab, Take 1-2 Tabs by mouth 1 time daily as needed (for symptoms of low phosphate (nausea, vomiting, malaise))., Disp: 120 Tab, Rfl: 0  •  carBAMazepine (TEGRETOL) 200 MG Tab, Take 200 mg by mouth every evening., Disp: , Rfl:   •  QUEtiapine (SEROQUEL) 25 MG Tab, Take 25 mg by mouth every evening., Disp: , Rfl:   •  atorvastatin (LIPITOR) 40 MG Tab, Take 40 mg by mouth every evening., Disp: , Rfl:   •  escitalopram (LEXAPRO) 20 MG tablet, Take 20 mg by mouth every evening., Disp: , Rfl:     ALLERGIES: No Known Allergies    SURGHX: No past surgical history on file.    SOCHX:  reports that he has quit smoking. His smoking use included cigarettes. He has never used smokeless tobacco. He reports previous alcohol use. He reports current drug use. Drugs:  Marijuana and Inhaled.     FH: Reviewed with patient's wife, not pertinent to this visit.    Review of Systems   Constitutional: Negative.    Respiratory: Negative.  Negative for shortness of breath.    Cardiovascular: Negative.  Negative for chest pain.   Gastrointestinal: Positive for vomiting.   Neurological: Positive for tremors and seizures.   Psychiatric/Behavioral: Positive for memory loss.   All other systems reviewed and are negative.    Additional details per HPI.      Objective:     /78   Pulse 71   Temp 36.4 °C (97.6 °F) (Temporal)   Resp 18   SpO2 99%     Physical Exam  Eyes:      Extraocular Movements: Extraocular movements intact.   Cardiovascular:      Rate and Rhythm: Normal rate.   Pulmonary:      Effort: Pulmonary effort is normal. No respiratory distress.   Neurological:      Mental Status: He is alert. He is disoriented.      Motor: Tremor present.      Comments: Oriented to self only       Assessment/Plan:     1. Altered mental status, unspecified altered mental status type    2. Intractable vomiting, presence of nausea not specified, unspecified vomiting type    3. History of seizure      Patient currently alert and oriented to self but disoriented to time and place.  Visibly shaking.  Not tolerating or retaining PO.  History of seizures.  Symptoms are worsening at this time.  Advised patient requires a higher level of care and recommend evaluation and treatment in the emergency department with transport via ambulance.  Wife agreeable.  EMS paged.  EMS arrives.  Report given to EMS.  Care transferred to EMS.

## 2020-07-08 NOTE — ED NOTES
Med rec updated and complete  Allergies reviewed  Interviewed pt with wife at bedside with permission from pt.  Pts wife had RX bottles at bedside, went over RX bottles and returned RX bottles back to pts wife.  Pts wife reports no vitamins or OTC's.  Pt reports no antibiotics in the last 2 weeks

## 2020-07-09 PROBLEM — F12.20 TETRAHYDROCANNABINOL (THC) DEPENDENCE (HCC): Status: ACTIVE | Noted: 2020-07-09

## 2020-07-09 LAB
ALBUMIN SERPL BCP-MCNC: 3.6 G/DL (ref 3.2–4.9)
ALBUMIN/GLOB SERPL: 1.6 G/DL
ALP SERPL-CCNC: 122 U/L (ref 30–99)
ALT SERPL-CCNC: 9 U/L (ref 2–50)
ANION GAP SERPL CALC-SCNC: 14 MMOL/L (ref 7–16)
AST SERPL-CCNC: 11 U/L (ref 12–45)
BASOPHILS # BLD AUTO: 0.3 % (ref 0–1.8)
BASOPHILS # BLD: 0.03 K/UL (ref 0–0.12)
BILIRUB SERPL-MCNC: 0.8 MG/DL (ref 0.1–1.5)
BUN SERPL-MCNC: 10 MG/DL (ref 8–22)
CALCIUM SERPL-MCNC: 8.1 MG/DL (ref 8.4–10.2)
CARBAMAZEPINE SERPL-MCNC: 6 UG/ML (ref 4–12)
CHLORIDE SERPL-SCNC: 103 MMOL/L (ref 96–112)
CO2 SERPL-SCNC: 25 MMOL/L (ref 20–33)
COVID ORDER STATUS COVID19: NORMAL
CREAT SERPL-MCNC: 1.07 MG/DL (ref 0.5–1.4)
EOSINOPHIL # BLD AUTO: 0 K/UL (ref 0–0.51)
EOSINOPHIL NFR BLD: 0 % (ref 0–6.9)
ERYTHROCYTE [DISTWIDTH] IN BLOOD BY AUTOMATED COUNT: 42.5 FL (ref 35.9–50)
GLOBULIN SER CALC-MCNC: 2.3 G/DL (ref 1.9–3.5)
GLUCOSE SERPL-MCNC: 96 MG/DL (ref 65–99)
HCT VFR BLD AUTO: 41.2 % (ref 42–52)
HGB BLD-MCNC: 14.1 G/DL (ref 14–18)
IMM GRANULOCYTES # BLD AUTO: 0.03 K/UL (ref 0–0.11)
IMM GRANULOCYTES NFR BLD AUTO: 0.3 % (ref 0–0.9)
LYMPHOCYTES # BLD AUTO: 2.29 K/UL (ref 1–4.8)
LYMPHOCYTES NFR BLD: 23.8 % (ref 22–41)
MAGNESIUM SERPL-MCNC: 1.7 MG/DL (ref 1.5–2.5)
MCH RBC QN AUTO: 31.8 PG (ref 27–33)
MCHC RBC AUTO-ENTMCNC: 34.2 G/DL (ref 33.7–35.3)
MCV RBC AUTO: 92.8 FL (ref 81.4–97.8)
MONOCYTES # BLD AUTO: 0.79 K/UL (ref 0–0.85)
MONOCYTES NFR BLD AUTO: 8.2 % (ref 0–13.4)
NEUTROPHILS # BLD AUTO: 6.48 K/UL (ref 1.82–7.42)
NEUTROPHILS NFR BLD: 67.4 % (ref 44–72)
NRBC # BLD AUTO: 0 K/UL
NRBC BLD-RTO: 0 /100 WBC
PHOSPHATE SERPL-MCNC: 2.2 MG/DL (ref 2.5–4.5)
PHOSPHATE SERPL-MCNC: 2.8 MG/DL (ref 2.5–4.5)
PHOSPHATE SERPL-MCNC: 3.4 MG/DL (ref 2.5–4.5)
PLATELET # BLD AUTO: 150 K/UL (ref 164–446)
PMV BLD AUTO: 9.6 FL (ref 9–12.9)
POTASSIUM SERPL-SCNC: 2.8 MMOL/L (ref 3.6–5.5)
POTASSIUM SERPL-SCNC: 2.9 MMOL/L (ref 3.6–5.5)
POTASSIUM SERPL-SCNC: 3.1 MMOL/L (ref 3.6–5.5)
PROT SERPL-MCNC: 5.9 G/DL (ref 6–8.2)
RBC # BLD AUTO: 4.44 M/UL (ref 4.7–6.1)
SODIUM SERPL-SCNC: 142 MMOL/L (ref 135–145)
WBC # BLD AUTO: 9.6 K/UL (ref 4.8–10.8)

## 2020-07-09 PROCEDURE — 96366 THER/PROPH/DIAG IV INF ADDON: CPT

## 2020-07-09 PROCEDURE — 700102 HCHG RX REV CODE 250 W/ 637 OVERRIDE(OP): Performed by: HOSPITALIST

## 2020-07-09 PROCEDURE — 83735 ASSAY OF MAGNESIUM: CPT

## 2020-07-09 PROCEDURE — 87328 CRYPTOSPORIDIUM AG IA: CPT

## 2020-07-09 PROCEDURE — 84132 ASSAY OF SERUM POTASSIUM: CPT | Mod: 91

## 2020-07-09 PROCEDURE — U0004 COV-19 TEST NON-CDC HGH THRU: HCPCS

## 2020-07-09 PROCEDURE — 96365 THER/PROPH/DIAG IV INF INIT: CPT

## 2020-07-09 PROCEDURE — 85025 COMPLETE CBC W/AUTO DIFF WBC: CPT

## 2020-07-09 PROCEDURE — A9270 NON-COVERED ITEM OR SERVICE: HCPCS | Performed by: HOSPITALIST

## 2020-07-09 PROCEDURE — 700111 HCHG RX REV CODE 636 W/ 250 OVERRIDE (IP): Performed by: HOSPITALIST

## 2020-07-09 PROCEDURE — C9803 HOPD COVID-19 SPEC COLLECT: HCPCS | Performed by: INTERNAL MEDICINE

## 2020-07-09 PROCEDURE — 84100 ASSAY OF PHOSPHORUS: CPT | Mod: 91

## 2020-07-09 PROCEDURE — 84105 ASSAY OF URINE PHOSPHORUS: CPT

## 2020-07-09 PROCEDURE — 770020 HCHG ROOM/CARE - TELE (206)

## 2020-07-09 PROCEDURE — 87045 FECES CULTURE AEROBIC BACT: CPT

## 2020-07-09 PROCEDURE — 80053 COMPREHEN METABOLIC PANEL: CPT

## 2020-07-09 PROCEDURE — 99232 SBSQ HOSP IP/OBS MODERATE 35: CPT | Performed by: HOSPITALIST

## 2020-07-09 PROCEDURE — 87046 STOOL CULTR AEROBIC BACT EA: CPT

## 2020-07-09 RX ORDER — MAGNESIUM SULFATE HEPTAHYDRATE 40 MG/ML
2 INJECTION, SOLUTION INTRAVENOUS ONCE
Status: COMPLETED | OUTPATIENT
Start: 2020-07-09 | End: 2020-07-09

## 2020-07-09 RX ORDER — POTASSIUM CHLORIDE 20 MEQ/1
40 TABLET, EXTENDED RELEASE ORAL 2 TIMES DAILY
Status: COMPLETED | OUTPATIENT
Start: 2020-07-09 | End: 2020-07-09

## 2020-07-09 RX ADMIN — ATORVASTATIN CALCIUM 40 MG: 40 TABLET, FILM COATED ORAL at 20:00

## 2020-07-09 RX ADMIN — CARBAMAZEPINE 400 MG: 200 TABLET ORAL at 17:13

## 2020-07-09 RX ADMIN — POTASSIUM CHLORIDE 40 MEQ: 1500 TABLET, EXTENDED RELEASE ORAL at 17:13

## 2020-07-09 RX ADMIN — ENOXAPARIN SODIUM 40 MG: 40 INJECTION SUBCUTANEOUS at 17:12

## 2020-07-09 RX ADMIN — ESCITALOPRAM OXALATE 20 MG: 10 TABLET ORAL at 17:13

## 2020-07-09 RX ADMIN — MAGNESIUM SULFATE 2 G: 2 INJECTION INTRAVENOUS at 09:08

## 2020-07-09 RX ADMIN — QUETIAPINE FUMARATE 25 MG: 25 TABLET ORAL at 17:13

## 2020-07-09 RX ADMIN — HYDRALAZINE HYDROCHLORIDE 20 MG: 20 INJECTION INTRAMUSCULAR; INTRAVENOUS at 15:51

## 2020-07-09 RX ADMIN — POTASSIUM CHLORIDE 40 MEQ: 1500 TABLET, EXTENDED RELEASE ORAL at 11:59

## 2020-07-09 RX ADMIN — ONDANSETRON 4 MG: 2 INJECTION INTRAMUSCULAR; INTRAVENOUS at 16:39

## 2020-07-09 ASSESSMENT — ENCOUNTER SYMPTOMS
NERVOUS/ANXIOUS: 1
VOMITING: 1
FEVER: 0
CHILLS: 0
COUGH: 0
DIARRHEA: 1
SEIZURES: 1
SHORTNESS OF BREATH: 0
WHEEZING: 0
NAUSEA: 1
WEIGHT LOSS: 1
HEADACHES: 0
CONSTIPATION: 0

## 2020-07-09 NOTE — ASSESSMENT & PLAN NOTE
EKG shows a sinus rhythm with a rate of 58, no ST elevation or ST depression, T wave flattening with a QTC of 476.  Repleting potassium and magnesium  Continuous cardiac monitoring

## 2020-07-09 NOTE — PROGRESS NOTES
Telemetry Shift Summary     Rhythm: 100% v-paced   HR Range:   Ectopy: Per Monitor Tech Diego: frequent PVCs, PACs, couplets, bigem, trigem and triplets  **6 beat run of v-tach**       Measurements for strip printed 7/9/20 at 3:01:   HR 89    0.18/0.12/0.36           Normal Values  Rhythm SR  HR Range    Measurements 0.12-0.20 / 0.06-0.10  / 0.30-0.52

## 2020-07-09 NOTE — CARE PLAN
Problem: Communication  Goal: The ability to communicate needs accurately and effectively will improve  Outcome: PROGRESSING AS EXPECTED  Updated pt whiteboard and discussed POC. Answered all questions. Pt verbalized understanding of teaching.       Problem: Safety  Goal: Will remain free from injury  Outcome: PROGRESSING AS EXPECTED   Educated pt to use the call light when getting out of bed. Educated pt about using the bed alarm at night to prevent falls. Pt verbalized an understanding of teaching.

## 2020-07-09 NOTE — ASSESSMENT & PLAN NOTE
Consulted [facility] Dr. 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

## 2020-07-09 NOTE — ASSESSMENT & PLAN NOTE
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

## 2020-07-09 NOTE — RESPIRATORY CARE
COPD EDUCATION by COPD CLINICAL EDUCATOR  7/9/2020 at 12:45 PM by Krystal Toro, RRT     Patient interviewed by COPD education team. Patient refused Smoking Cessation program at this time. No hx dx COPD.

## 2020-07-09 NOTE — PROGRESS NOTES
Received shift handoff report from KALA Guerrero. Pt is in bed and stable. Bed locked and in lowest position, upper side rails up, call light in reach, whiteboard updated. POC discussed and pt verbalizes understanding. Will continue to monitor.

## 2020-07-09 NOTE — PROGRESS NOTES
2 RN Skin Check    2 RN skin check complete.   Devices in place: SCDs.  Skin assessed under devices: yes.  Confirmed pressure ulcers found on: None.  New potential pressure ulcers noted on N/A. Wound consult placed N/A.  The following interventions in place Pillows.    Skin intact.

## 2020-07-09 NOTE — PROGRESS NOTES
Bedside report give to KALA Solomon. POC discussed. Patient resting comfortably in bed. Care released

## 2020-07-09 NOTE — CONSULTS
Gastroenterology Consultation    Date of Service  7/9/2020    Referring Physician  Graham Tripathi M.D.    Consulting Physician  Chad Yen M.D.    Reason for Consultation  Nausea and vomiting    History of Presenting Illness  64 y.o. male with history of seizure disorder, hypophosphatemia, hyperlipidemia, ADHD who presented 7/8/2020 with recurrent nausea and vomiting with electrolyte disturbances.    He notes developing episodic symptoms of recurrent severe vomiting episodes beginning last September 2019.  He has required 4 admissions for similar symptoms.  He has been diagnosed with gastroenteritis in the past.  He typically presents with vomiting leading to dehydration with associated low potassium and magnesium and phosphorus levels and occasionally seizures and encephalopathy.  He presented for similar symptoms yesterday.    He describes episodes as acute onset of vomiting typically without preceding nausea early in the morning after awakening.  He will have persistent bilious emesis until he has dry heaves and is unable to keep down any medications, liquids or foods.  Symptoms last for several hours up to a couple of days.  He denies associated abdominal pain or diarrhea typically although had some diarrhea this time.  He denies preceding auras.  Has not tried hot showers for symptoms.  He sometimes will get symptoms that can be aborted with Zofran but not always.  Denies associated weight loss.  Denies new medications.  He does note smoking marijuana twice daily for several years for anxiety/ADHD issues to help him relax.    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  He has had chronically low phosphorus and has been seeing endocrinologist without clear cause.  Has not seen nephrology.      Review of Systems  Review of Systems   Constitutional: Positive for weight loss.   Gastrointestinal: Positive for diarrhea, nausea and vomiting.    Neurological: Positive for seizures.   Psychiatric/Behavioral: The patient is nervous/anxious.    All other systems reviewed and are negative.      Past Medical History   has a past medical history of Dyslipidemia, Hypertension, Hypophosphatemia, Psychiatric problem with ADHD, and Seizure disorder (HCC).     Surgical History  Hippocampal resection, pyloric stenosis surgery at 6 weeks old, ankle surgery    Family History  Denies GI cancers    Social History   reports that he quit smoking about 30 years ago. His smoking use included cigarettes. He has never used smokeless tobacco. He reports previous alcohol use. He reports current drug use. Drugs: Marijuana and Inhaled.    Medications    Current Facility-Administered Medications:   •  potassium chloride SA  •  atorvastatin  •  carBAMazepine  •  escitalopram  •  QUEtiapine  •  acetaminophen  •  senna-docusate **AND** polyethylene glycol/lytes **AND** magnesium hydroxide **AND** bisacodyl  •  enoxaparin  •  ondansetron  •  ondansetron  •  Notify provider if pain remains uncontrolled **AND** Use the numeric rating scale (NRS-11) on regular floors and Critical-Care Pain Observation Tool (CPOT) on ICUs/Trauma to assess pain **AND** Pulse Ox (Oximetry) **AND** Pharmacy Consult Request **AND** If patient difficult to arouse and/or has respiratory depression, stop any opiates that are currently infusing and call a Rapid Response. **AND** oxyCODONE immediate-release **AND** oxyCODONE immediate-release **AND** HYDROmorphone  •  LORazepam  •  hydrALAZINE  •  labetalol  •  hydrALAZINE    Medications Prior to Admission   Medication Sig Dispense Refill Last Dose   • phosphorus (K-PHOS-NEUTRAL) 155-852-130 MG tablet Take 2 Tabs by mouth every bedtime.   7/7/2020 at 2300   • carBAMazepine (TEGRETOL) 200 MG Tab Take 400 mg by mouth every evening.   7/7/2020 at 2300   • QUEtiapine (SEROQUEL) 25 MG Tab Take 25 mg by mouth every evening.   7/7/2020 at 2300   • atorvastatin (LIPITOR)  40 MG Tab Take 40 mg by mouth every evening.   7/7/2020 at 2300   • escitalopram (LEXAPRO) 20 MG tablet Take 20 mg by mouth every evening.   7/7/2020 at 2300         Allergies  No Known Allergies    Physical Exam  Temp:  [36.6 °C (97.8 °F)-37.4 °C (99.4 °F)] 37.1 °C (98.7 °F)  Pulse:  [58-69] 63  Resp:  [16-18] 18  BP: (132-185)/(62-91) 172/89  SpO2:  [88 %-97 %] 95 %    Physical Exam  Vitals signs and nursing note reviewed.   Constitutional:       General: He is not in acute distress.     Appearance: Normal appearance. He is normal weight. He is not ill-appearing or toxic-appearing.   HENT:      Head: Normocephalic and atraumatic.      Mouth/Throat:      Mouth: Mucous membranes are moist.      Pharynx: Oropharynx is clear. No oropharyngeal exudate or posterior oropharyngeal erythema.   Eyes:      General: No scleral icterus.     Extraocular Movements: Extraocular movements intact.      Conjunctiva/sclera: Conjunctivae normal.      Pupils: Pupils are equal, round, and reactive to light.   Neck:      Musculoskeletal: Normal range of motion and neck supple.   Cardiovascular:      Rate and Rhythm: Normal rate and regular rhythm.      Pulses: Normal pulses.      Heart sounds: Normal heart sounds. No murmur.   Pulmonary:      Effort: Pulmonary effort is normal. No respiratory distress.      Breath sounds: Normal breath sounds. No stridor. No wheezing, rhonchi or rales.   Abdominal:      General: Abdomen is flat. Bowel sounds are normal. There is no distension.      Palpations: Abdomen is soft. There is no mass.      Tenderness: There is no abdominal tenderness. There is no guarding or rebound.      Hernia: No hernia is present.   Musculoskeletal:      Right lower leg: No edema.      Left lower leg: No edema.   Skin:     General: Skin is warm and dry.      Coloration: Skin is not jaundiced.   Neurological:      General: No focal deficit present.      Mental Status: He is alert and oriented to person, place, and time.    Psychiatric:         Mood and Affect: Mood normal.         Behavior: Behavior normal.         Fluids  Date 07/09/20 0700 - 07/10/20 0659   Shift 5227-4716 6191-7806 3558-3556 24 Hour Total   INTAKE   Shift Total       OUTPUT   Urine 200   200   Shift Total 200   200   Weight (kg) 81.9 81.9 81.9 81.9       Laboratory  Recent Labs     07/08/20  1340 07/09/20  0349   WBC 14.6* 9.6   RBC 5.31 4.44*   HEMOGLOBIN 16.8 14.1   HEMATOCRIT 46.8 41.2*   MCV 88.1 92.8   MCH 31.6 31.8   MCHC 35.9* 34.2   RDW 40.0 42.5   PLATELETCT 206 150*   MPV 9.6 9.6     Recent Labs     07/08/20  1340  07/09/20  0349 07/09/20  0809 07/09/20  1226   SODIUM 137  --  142  --   --    POTASSIUM 2.6*   < > 2.8* 2.9* 3.1*   CHLORIDE 95*  --  103  --   --    CO2 22  --  25  --   --    GLUCOSE 164*  --  96  --   --    BUN 14  --  10  --   --    CREATININE 1.22  --  1.07  --   --    CALCIUM 9.3  --  8.1*  --   --     < > = values in this interval not displayed.                     Imaging  CT-HEAD W/O   Final Result      No acute intracranial abnormality is identified.      Left temporal craniotomy with underlying encephalomalacia            Assessment/Plan  63 y/o with seizure disorder, hypophosphatemia, ADHD, chronic marijuana use that presented for recurrent vomiting with associated electrolyte disturbances.  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  This was discussed with he and his wife although unclear that they believe this diagnosis.  I did encourage them to research this on Google or WebMD as well.  Also need to rule out structural upper GI issues.    PROBLEMS:  1. Recurrent vomiting  2. Diarrhea  3. Hypophosphatemia  4. Hypokalemia  5. Hypomagnesemia  6. Seizure disorder  7. Anxiety/ADHD  8. Chronic marijuana use daily    PLAN:  1. EGD tomorrow with anesthesia.  NPO at midnight  2. Discussed need for cessation of marijuana, mainly THC component.  OK  for CBD  3. Consider dissolvable Zofran or Phenergan suppositories during acute episodes to abort episodes.  If requires ED evaluation, consider IV haldol which helps with cyclic vomiting  4. Consider low dose TCA for prevention as well as Coenzyme Q10 but only if symptoms persist after stopping marijuana  5. Consider nephrology consultation for evaluation for RTA given electrolyte disturbances

## 2020-07-09 NOTE — PROGRESS NOTES
Late entry:     Completed admission profile with pt and wife at bedside. Discussed POC and answered all questions. Administered all medications per MAR. Seizure precautions in place. SCD's on. Call light in reach, bed locked in lowest position. Will continue to monitor.

## 2020-07-09 NOTE — PROGRESS NOTES
[facility] Medicine Daily Progress Note    Date of Service  7/9/2020    Chief Complaint  64 y.o. male admitted 7/8/2020 with hypokalemia, hypophosphatemia, seizure, nausea vomiting    Interval Problem Update  7/9: Repleting potassium and phosphorus and magnesium.  Ordered stool cultures.  Consulted [facility] Dr. Yen.  Will consult nephro [place] for tomorrow.  EGD tomorrow.  He will be admitted to inpatient for further management that is expected to extend the LOS to greater than 2 midnights.     Disposition  Pending EGD    Review of Systems  Review of Systems   Constitutional: Negative for chills and fever.   Respiratory: Negative for cough, shortness of breath and wheezing.    Cardiovascular: Negative for chest pain.   Gastrointestinal: Positive for diarrhea, nausea and vomiting. Negative for constipation.   Genitourinary: Negative for dysuria.   Neurological: Positive for seizures. Negative for headaches.        Physical Exam  Temp:  [36.6 °C (97.8 °F)-37.4 °C (99.4 °F)] 37.1 °C (98.7 °F)  Pulse:  [58-69] 63  Resp:  [16-18] 18  BP: (132-185)/(62-91) 172/89  SpO2:  [91 %-97 %] 95 %    Physical Exam  Constitutional:       Appearance: He is well-developed.   HENT:      Head: Normocephalic.   Cardiovascular:      Rate and Rhythm: Normal rate.      Heart sounds: No gallop.    Pulmonary:      Effort: No respiratory distress.      Breath sounds: No wheezing.   Abdominal:      General: There is no distension.      Tenderness: There is no abdominal tenderness.   Skin:     General: Skin is warm.   Neurological:      Mental Status: He is alert. Mental status is at baseline.   Psychiatric:         Cognition and Memory: Memory is impaired.         Fluids    Intake/Output Summary (Last 24 hours) at 7/9/2020 1647  Last data filed at 7/9/2020 0948  Gross per 24 hour   Intake --   Output 725 ml   Net -725 ml       Laboratory  Recent Labs     07/08/20  1340 07/09/20  0349   WBC 14.6* 9.6   RBC 5.31 4.44*   HEMOGLOBIN 16.8  14.1   HEMATOCRIT 46.8 41.2*   MCV 88.1 92.8   MCH 31.6 31.8   MCHC 35.9* 34.2   RDW 40.0 42.5   PLATELETCT 206 150*   MPV 9.6 9.6     Recent Labs     07/08/20  1340  07/09/20  0349 07/09/20  0809 07/09/20  1226   SODIUM 137  --  142  --   --    POTASSIUM 2.6*   < > 2.8* 2.9* 3.1*   CHLORIDE 95*  --  103  --   --    CO2 22  --  25  --   --    GLUCOSE 164*  --  96  --   --    BUN 14  --  10  --   --    CREATININE 1.22  --  1.07  --   --    CALCIUM 9.3  --  8.1*  --   --     < > = values in this interval not displayed.                   Imaging  CT-HEAD W/O   Final Result      No acute intracranial abnormality is identified.      Left temporal craniotomy with underlying encephalomalacia           Assessment/Plan  * Seizure (HCC)- (present on admission)  Assessment & Plan  History of chronic seizure disorder on Tegretol.  S/p left hippocampus removal  ~ 5 years   Last seizure Sep 2020   Likely made worse by electrolyte disturbances.  Will check carbamazepine level  Resume home carbamazepine, as needed lorazepam for seizures    Acute encephalopathy  Assessment & Plan  Likely secondary to seizures, expect to improve with controlling his seizures    Intractable vomiting with nausea  Assessment & Plan  Consulted [facility] Dr. 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  Counseled by GI, patient and wife consult by [facility] Dr. Yen       VTE prophylaxis: enoxaparin

## 2020-07-09 NOTE — CARE PLAN
Problem: Communication  Goal: The ability to communicate needs accurately and effectively will improve  Outcome: PROGRESSING AS EXPECTED  Note: Verbalizes understanding of plan of care. States he is feeling much better.      Problem: Safety  Goal: Will remain free from injury  Outcome: PROGRESSING AS EXPECTED  Note: Pt verbalizes understanding of safety precautions. Able to get up self but calls if requiring assistance. Call light within reach.   Goal: Will remain free from falls  Outcome: PROGRESSING AS EXPECTED  Note: Educated on fall risk and to dangle prior to standing. Pt verbalizes understanding.

## 2020-07-09 NOTE — PROGRESS NOTES
Telemetry Shift Summary     Rhythm: SR-SB  HR Range: 50-70  Ectopy: Per Monitor Tech Diego: frequent PVCs  2nd degree type 1 and rare couplets        Measurements for strip printed 7/9/20 at 3:01:   HR 59    0.16/0.10/0.44           Normal Values  Rhythm SR  HR Range    Measurements 0.12-0.20 / 0.06-0.10  / 0.30-0.52

## 2020-07-10 ENCOUNTER — ANESTHESIA (OUTPATIENT)
Dept: SURGERY | Facility: MEDICAL CENTER | Age: 65
DRG: 918 | End: 2020-07-10
Payer: MEDICARE

## 2020-07-10 ENCOUNTER — ANESTHESIA EVENT (OUTPATIENT)
Dept: SURGERY | Facility: MEDICAL CENTER | Age: 65
DRG: 918 | End: 2020-07-10
Payer: MEDICARE

## 2020-07-10 VITALS
SYSTOLIC BLOOD PRESSURE: 164 MMHG | RESPIRATION RATE: 16 BRPM | OXYGEN SATURATION: 97 % | TEMPERATURE: 98.4 F | HEART RATE: 62 BPM | BODY MASS INDEX: 23.93 KG/M2 | DIASTOLIC BLOOD PRESSURE: 88 MMHG | WEIGHT: 180.56 LBS | HEIGHT: 73 IN

## 2020-07-10 PROBLEM — K22.10 ESOPHAGITIS, EROSIVE: Status: ACTIVE | Noted: 2020-07-10

## 2020-07-10 PROBLEM — F12.288 CANNABIS HYPEREMESIS SYNDROME CONCURRENT WITH AND DUE TO CANNABIS DEPENDENCE (HCC): Status: ACTIVE | Noted: 2019-09-26

## 2020-07-10 LAB
25(OH)D3 SERPL-MCNC: 22 NG/ML (ref 30–100)
ALBUMIN SERPL BCP-MCNC: 3.7 G/DL (ref 3.2–4.9)
ALBUMIN/GLOB SERPL: 1.4 G/DL
ALP SERPL-CCNC: 132 U/L (ref 30–99)
ALT SERPL-CCNC: 9 U/L (ref 2–50)
ANION GAP SERPL CALC-SCNC: 14 MMOL/L (ref 7–16)
AST SERPL-CCNC: 12 U/L (ref 12–45)
BILIRUB SERPL-MCNC: 0.8 MG/DL (ref 0.1–1.5)
BUN SERPL-MCNC: 11 MG/DL (ref 8–22)
CALCIUM SERPL-MCNC: 8.9 MG/DL (ref 8.4–10.2)
CHLORIDE SERPL-SCNC: 107 MMOL/L (ref 96–112)
CO2 SERPL-SCNC: 21 MMOL/L (ref 20–33)
CREAT SERPL-MCNC: 1.06 MG/DL (ref 0.5–1.4)
ERYTHROCYTE [DISTWIDTH] IN BLOOD BY AUTOMATED COUNT: 41.2 FL (ref 35.9–50)
G LAMBLIA+C PARVUM AG STL QL RAPID: NORMAL
GLOBULIN SER CALC-MCNC: 2.6 G/DL (ref 1.9–3.5)
GLUCOSE SERPL-MCNC: 97 MG/DL (ref 65–99)
HCT VFR BLD AUTO: 42.3 % (ref 42–52)
HGB BLD-MCNC: 14.8 G/DL (ref 14–18)
MAGNESIUM SERPL-MCNC: 2.2 MG/DL (ref 1.5–2.5)
MCH RBC QN AUTO: 31.9 PG (ref 27–33)
MCHC RBC AUTO-ENTMCNC: 35 G/DL (ref 33.7–35.3)
MCV RBC AUTO: 91.2 FL (ref 81.4–97.8)
PATHOLOGY CONSULT NOTE: NORMAL
PHOSPHATE SERPL-MCNC: 2 MG/DL (ref 2.5–4.5)
PLATELET # BLD AUTO: 151 K/UL (ref 164–446)
PMV BLD AUTO: 9.6 FL (ref 9–12.9)
POTASSIUM SERPL-SCNC: 3.3 MMOL/L (ref 3.6–5.5)
PROT SERPL-MCNC: 6.3 G/DL (ref 6–8.2)
PTH-INTACT SERPL-MCNC: 124 PG/ML (ref 14–72)
RBC # BLD AUTO: 4.64 M/UL (ref 4.7–6.1)
SARS-COV-2 RDRP RESP QL NAA+PROBE: NOTDETECTED
SIGNIFICANT IND 70042: NORMAL
SITE SITE: NORMAL
SODIUM SERPL-SCNC: 142 MMOL/L (ref 135–145)
SOURCE SOURCE: NORMAL
SPECIMEN SOURCE: NORMAL
WBC # BLD AUTO: 8.6 K/UL (ref 4.8–10.8)

## 2020-07-10 PROCEDURE — 160035 HCHG PACU - 1ST 60 MINS PHASE I: Performed by: INTERNAL MEDICINE

## 2020-07-10 PROCEDURE — A9270 NON-COVERED ITEM OR SERVICE: HCPCS | Performed by: HOSPITALIST

## 2020-07-10 PROCEDURE — 88305 TISSUE EXAM BY PATHOLOGIST: CPT | Mod: 59

## 2020-07-10 PROCEDURE — A9270 NON-COVERED ITEM OR SERVICE: HCPCS | Performed by: INTERNAL MEDICINE

## 2020-07-10 PROCEDURE — 0DB98ZX EXCISION OF DUODENUM, VIA NATURAL OR ARTIFICIAL OPENING ENDOSCOPIC, DIAGNOSTIC: ICD-10-PCS | Performed by: INTERNAL MEDICINE

## 2020-07-10 PROCEDURE — 88312 SPECIAL STAINS GROUP 1: CPT

## 2020-07-10 PROCEDURE — 700105 HCHG RX REV CODE 258: Performed by: INTERNAL MEDICINE

## 2020-07-10 PROCEDURE — 160002 HCHG RECOVERY MINUTES (STAT): Performed by: INTERNAL MEDICINE

## 2020-07-10 PROCEDURE — 500066 HCHG BITE BLOCK, ECT: Performed by: INTERNAL MEDICINE

## 2020-07-10 PROCEDURE — 0DB68ZX EXCISION OF STOMACH, VIA NATURAL OR ARTIFICIAL OPENING ENDOSCOPIC, DIAGNOSTIC: ICD-10-PCS | Performed by: INTERNAL MEDICINE

## 2020-07-10 PROCEDURE — 700102 HCHG RX REV CODE 250 W/ 637 OVERRIDE(OP): Performed by: INTERNAL MEDICINE

## 2020-07-10 PROCEDURE — 84100 ASSAY OF PHOSPHORUS: CPT

## 2020-07-10 PROCEDURE — 700102 HCHG RX REV CODE 250 W/ 637 OVERRIDE(OP): Performed by: HOSPITALIST

## 2020-07-10 PROCEDURE — 82306 VITAMIN D 25 HYDROXY: CPT

## 2020-07-10 PROCEDURE — 99238 HOSP IP/OBS DSCHRG MGMT 30/<: CPT | Performed by: HOSPITALIST

## 2020-07-10 PROCEDURE — 160048 HCHG OR STATISTICAL LEVEL 1-5: Performed by: INTERNAL MEDICINE

## 2020-07-10 PROCEDURE — 83970 ASSAY OF PARATHORMONE: CPT

## 2020-07-10 PROCEDURE — 0DB58ZX EXCISION OF ESOPHAGUS, VIA NATURAL OR ARTIFICIAL OPENING ENDOSCOPIC, DIAGNOSTIC: ICD-10-PCS | Performed by: INTERNAL MEDICINE

## 2020-07-10 PROCEDURE — 80053 COMPREHEN METABOLIC PANEL: CPT

## 2020-07-10 PROCEDURE — 160202 HCHG ENDO MINUTES - 1ST 30 MINS LEVEL 3: Performed by: INTERNAL MEDICINE

## 2020-07-10 PROCEDURE — 85027 COMPLETE CBC AUTOMATED: CPT

## 2020-07-10 PROCEDURE — 160009 HCHG ANES TIME/MIN: Performed by: INTERNAL MEDICINE

## 2020-07-10 PROCEDURE — 83735 ASSAY OF MAGNESIUM: CPT

## 2020-07-10 PROCEDURE — 700111 HCHG RX REV CODE 636 W/ 250 OVERRIDE (IP): Performed by: ANESTHESIOLOGY

## 2020-07-10 RX ORDER — METOPROLOL TARTRATE 1 MG/ML
1 INJECTION, SOLUTION INTRAVENOUS
Status: DISCONTINUED | OUTPATIENT
Start: 2020-07-10 | End: 2020-07-10 | Stop reason: HOSPADM

## 2020-07-10 RX ORDER — MEPERIDINE HYDROCHLORIDE 25 MG/ML
12.5 INJECTION INTRAMUSCULAR; INTRAVENOUS; SUBCUTANEOUS
Status: DISCONTINUED | OUTPATIENT
Start: 2020-07-10 | End: 2020-07-10 | Stop reason: HOSPADM

## 2020-07-10 RX ORDER — OMEPRAZOLE 20 MG/1
20 CAPSULE, DELAYED RELEASE ORAL DAILY
Status: DISCONTINUED | OUTPATIENT
Start: 2020-07-10 | End: 2020-07-10 | Stop reason: HOSPADM

## 2020-07-10 RX ORDER — HYDRALAZINE HYDROCHLORIDE 20 MG/ML
5 INJECTION INTRAMUSCULAR; INTRAVENOUS
Status: DISCONTINUED | OUTPATIENT
Start: 2020-07-10 | End: 2020-07-10 | Stop reason: HOSPADM

## 2020-07-10 RX ORDER — SODIUM CHLORIDE, SODIUM LACTATE, POTASSIUM CHLORIDE, CALCIUM CHLORIDE 600; 310; 30; 20 MG/100ML; MG/100ML; MG/100ML; MG/100ML
INJECTION, SOLUTION INTRAVENOUS CONTINUOUS
Status: DISCONTINUED | OUTPATIENT
Start: 2020-07-10 | End: 2020-07-10 | Stop reason: HOSPADM

## 2020-07-10 RX ORDER — SUCRALFATE 1 G/1
1 TABLET ORAL
Qty: 42 TAB | Refills: 0 | Status: SHIPPED | OUTPATIENT
Start: 2020-07-10 | End: 2020-07-24

## 2020-07-10 RX ORDER — HALOPERIDOL 5 MG/ML
1 INJECTION INTRAMUSCULAR
Status: DISCONTINUED | OUTPATIENT
Start: 2020-07-10 | End: 2020-07-10 | Stop reason: HOSPADM

## 2020-07-10 RX ORDER — ONDANSETRON 4 MG/1
4 TABLET, FILM COATED ORAL EVERY 4 HOURS PRN
Qty: 30 TAB | Refills: 1 | Status: ON HOLD | OUTPATIENT
Start: 2020-07-10 | End: 2023-01-01

## 2020-07-10 RX ORDER — LABETALOL HYDROCHLORIDE 5 MG/ML
5 INJECTION, SOLUTION INTRAVENOUS
Status: DISCONTINUED | OUTPATIENT
Start: 2020-07-10 | End: 2020-07-10 | Stop reason: HOSPADM

## 2020-07-10 RX ORDER — ONDANSETRON 2 MG/ML
4 INJECTION INTRAMUSCULAR; INTRAVENOUS
Status: DISCONTINUED | OUTPATIENT
Start: 2020-07-10 | End: 2020-07-10 | Stop reason: HOSPADM

## 2020-07-10 RX ORDER — MIDAZOLAM HYDROCHLORIDE 1 MG/ML
1 INJECTION INTRAMUSCULAR; INTRAVENOUS
Status: DISCONTINUED | OUTPATIENT
Start: 2020-07-10 | End: 2020-07-10 | Stop reason: HOSPADM

## 2020-07-10 RX ORDER — OMEPRAZOLE 20 MG/1
20 CAPSULE, DELAYED RELEASE ORAL DAILY
Qty: 30 CAP | Refills: 2 | Status: ON HOLD | OUTPATIENT
Start: 2020-07-10 | End: 2023-01-01

## 2020-07-10 RX ORDER — ONDANSETRON 2 MG/ML
INJECTION INTRAMUSCULAR; INTRAVENOUS PRN
Status: DISCONTINUED | OUTPATIENT
Start: 2020-07-10 | End: 2020-07-10 | Stop reason: SURG

## 2020-07-10 RX ORDER — SUCRALFATE ORAL 1 G/10ML
1 SUSPENSION ORAL EVERY 6 HOURS
Status: DISCONTINUED | OUTPATIENT
Start: 2020-07-10 | End: 2020-07-10 | Stop reason: HOSPADM

## 2020-07-10 RX ORDER — DIPHENHYDRAMINE HYDROCHLORIDE 50 MG/ML
12.5 INJECTION INTRAMUSCULAR; INTRAVENOUS
Status: DISCONTINUED | OUTPATIENT
Start: 2020-07-10 | End: 2020-07-10 | Stop reason: HOSPADM

## 2020-07-10 RX ADMIN — ONDANSETRON 4 MG: 2 INJECTION INTRAMUSCULAR; INTRAVENOUS at 13:13

## 2020-07-10 RX ADMIN — SUCCINYLCHOLINE CHLORIDE 100 MG: 20 INJECTION, SOLUTION INTRAMUSCULAR; INTRAVENOUS at 13:13

## 2020-07-10 RX ADMIN — PROPOFOL 200 MG: 10 INJECTION, EMULSION INTRAVENOUS at 13:13

## 2020-07-10 RX ADMIN — DIBASIC SODIUM PHOSPHATE, MONOBASIC POTASSIUM PHOSPHATE AND MONOBASIC SODIUM PHOSPHATE 1 TABLET: 852; 155; 130 TABLET ORAL at 08:59

## 2020-07-10 RX ADMIN — POVIDONE-IODINE 15 ML: 10 SOLUTION TOPICAL at 12:31

## 2020-07-10 RX ADMIN — SODIUM CHLORIDE, POTASSIUM CHLORIDE, SODIUM LACTATE AND CALCIUM CHLORIDE: 600; 310; 30; 20 INJECTION, SOLUTION INTRAVENOUS at 12:31

## 2020-07-10 RX ADMIN — SUCRALFATE 1 G: 1 SUSPENSION ORAL at 15:14

## 2020-07-10 RX ADMIN — OMEPRAZOLE 20 MG: 20 CAPSULE, DELAYED RELEASE ORAL at 15:14

## 2020-07-10 ASSESSMENT — ENCOUNTER SYMPTOMS
CONSTITUTIONAL NEGATIVE: 1
RESPIRATORY NEGATIVE: 1
GASTROINTESTINAL NEGATIVE: 1
CARDIOVASCULAR NEGATIVE: 1

## 2020-07-10 ASSESSMENT — PAIN SCALES - GENERAL: PAIN_LEVEL: 0

## 2020-07-10 NOTE — DISCHARGE INSTRUCTIONS
Discharge Instructions    Discharged to home by car with relative. Discharged via walking, hospital escort: Yes.  Special equipment needed: Not Applicable    Be sure to schedule a follow-up appointment with your primary care doctor or any specialists as instructed.     Discharge Plan:        I understand that a diet low in cholesterol, fat, and sodium is recommended for good health. Unless I have been given specific instructions below for another diet, I accept this instruction as my diet prescription.   Other diet: Regular    Special Instructions: None    · Is patient discharged on Warfarin / Coumadin?   No     Depression / Suicide Risk    As you are discharged from this [facility] facility, it is important to learn how to keep safe from harming yourself.    Recognize the warning signs:  · Abrupt changes in personality, positive or negative- including increase in energy   · Giving away possessions  · Change in eating patterns- significant weight changes-  positive or negative  · Change in sleeping patterns- unable to sleep or sleeping all the time   · Unwillingness or inability to communicate  · Depression  · Unusual sadness, discouragement and loneliness  · Talk of wanting to die  · Neglect of personal appearance   · Rebelliousness- reckless behavior  · Withdrawal from people/activities they love  · Confusion- inability to concentrate     If you or a loved one observes any of these behaviors or has concerns about self-harm, here's what you can do:  · Talk about it- your feelings and reasons for harming yourself  · Remove any means that you might use to hurt yourself (examples: pills, rope, extension cords, firearm)  · Get professional help from the community (Mental Health, Substance Abuse, psychological counseling)  · Do not be alone:Call your Safe Contact- someone whom you trust who will be there for you.  · Call your local CRISIS HOTLINE 023-4649 or 725-369-1752  · Call your local Children's Mobile Crisis  Response Team [facility] (522) 116-4100 or www.Toobla  · Call the toll free National Suicide Prevention Hotlines   · National Suicide Prevention Lifeline 230-036-AWHO (8786)  · National Hope Line Network 800-SUICIDE (237-5910)    cOmeprazole tablets (OTC)  What is this medicine?  OMEPRAZOLE (oh ME pray zol) prevents the production of acid in the stomach. It is used to treat the symptoms of heartburn. You can buy this medicine without a prescription. This product is not for long-term use, unless otherwise directed by your doctor or health care professional.  This medicine may be used for other purposes; ask your health care provider or pharmacist if you have questions.  COMMON BRAND NAME(S): Prilosec OTC  What should I tell my health care provider before I take this medicine?  They need to know if you have any of these conditions:  · black or bloody stools  · chest pain  · difficulty swallowing  · have had heartburn for over 3 months  · have heartburn with dizziness, lightheadedness or sweating  · liver disease  · lupus  · stomach pain  · unexplained weight loss  · vomiting with blood  · wheezing  · an unusual or allergic reaction to omeprazole, other medicines, foods, dyes, or preservatives  · pregnant or trying to get pregnant  · breast-feeding  How should I use this medicine?  Take this medicine by mouth with a glass of water. Follow the directions on the product label. Do not cut, crush or chew this medicine. Swallow the tablets whole. Take this medicine on an empty stomach, at least 30 minutes before breakfast. Take your medicine at regular intervals. Do not take it more often than directed.  Talk to your pediatrician regarding the use of this medicine in children. Special care may be needed.  Overdosage: If you think you have taken too much of this medicine contact a poison control center or emergency room at once.  NOTE: This medicine is only for you. Do not share this medicine with others.  What  if I miss a dose?  If you miss a dose, take it as soon as you can. If it is almost time for your next dose, take only that dose. Do not take double or extra doses.  What may interact with this medicine?  Do not take this medicine with any of the following medications:  · atazanavir  · clopidogrel  · nelfinavir  · rilpivirine  This medicine may also interact with the following medications:  · antifungals like itraconazole, ketoconazole, and voriconazole  · certain antivirals for HIV or hepatitis  · certain medicines that treat or prevent blood clots like warfarin  · cilostazol  · citalopram  · cyclosporine  · dasatinib  · digoxin  · disulfiram  · diuretics  · erlotinib  · iron supplements  · medicines for anxiety, panic, and sleep like diazepam  · medicines for seizures like carbamazepine, phenobarbital, phenytoin  · methotrexate  · mycophenolate mofetil  · nilotinib  · rifampin  · Jones's wort  · tacrolimus  · vitamin B12  This list may not describe all possible interactions. Give your health care provider a list of all the medicines, herbs, non-prescription drugs, or dietary supplements you use. Also tell them if you smoke, drink alcohol, or use illegal drugs. Some items may interact with your medicine.  What should I watch for while using this medicine?  It can take several days before your heartburn gets better. Tell your healthcare professional if your symptoms do not start to get better or if they get worse. If you need to take this medicine for more than 14 days, talk to your healthcare professional. Heartburn may sometimes be caused by a more serious condition.  This medicine may cause a decrease in vitamin B12. You should make sure that you get enough vitamin B12 while you are taking this medicine. Discuss the foods you eat and the vitamins you take with your health care professional.  What side effects may I notice from receiving this medicine?  Side effects that you should report to your doctor or  health care professional as soon as possible:  · allergic reactions like skin rash, itching or hives, swelling of the face, lips, or tongue  · bone pain  · breathing problems  · fever or sore throat  · joint pain  · rash on cheeks or arms that gets worse in the sun  · redness, blistering, peeling, or loosening of the skin, including inside the mouth  · severe diarrhea  · signs and symptoms of kidney injury like trouble passing urine or change in the amount of urine  · signs and symptoms of low magnesium like muscle cramps; muscle pain; muscle weakness; tremors; seizures; or fast, irregular heartbeat  · stomach polyps  · unusual bleeding or bruising  Side effects that usually do not require medical attention (report to your doctor or health care professional if they continue or are bothersome):  · diarrhea  · dry mouth  · gas  · headache  · nausea  · stomach pain  This list may not describe all possible side effects. Call your doctor for medical advice about side effects. You may report side effects to FDA at 5-331-UDJ-1778.  Where should I keep my medicine?  Keep out of the reach of children.  Store at room temperature between 20 and 25 degrees C (68 and 77 degrees F). Protect from light and moisture. Throw away any unused medicine after the expiration date.  NOTE: This sheet is a summary. It may not cover all possible information. If you have questions about this medicine, talk to your doctor, pharmacist, or health care provider.  © 2020 Elsevier/Gold Standard (2019-10-09 13:06:30)      Ondansetron tablets  What is this medicine?  ONDANSETRON (on DAN se andra) is used to treat nausea and vomiting caused by chemotherapy. It is also used to prevent or treat nausea and vomiting after surgery.  This medicine may be used for other purposes; ask your health care provider or pharmacist if you have questions.  COMMON BRAND NAME(S): Zofran  What should I tell my health care provider before I take this medicine?  They need to  know if you have any of these conditions:  · heart disease  · history of irregular heartbeat  · liver disease  · low levels of magnesium or potassium in the blood  · an unusual or allergic reaction to ondansetron, granisetron, other medicines, foods, dyes, or preservatives  · pregnant or trying to get pregnant  · breast-feeding  How should I use this medicine?  Take this medicine by mouth with a glass of water. Follow the directions on your prescription label. Take your doses at regular intervals. Do not take your medicine more often than directed.  Talk to your pediatrician regarding the use of this medicine in children. Special care may be needed.  Overdosage: If you think you have taken too much of this medicine contact a poison control center or emergency room at once.  NOTE: This medicine is only for you. Do not share this medicine with others.  What if I miss a dose?  If you miss a dose, take it as soon as you can. If it is almost time for your next dose, take only that dose. Do not take double or extra doses.  What may interact with this medicine?  Do not take this medicine with any of the following medications:  · apomorphine  · certain medicines for fungal infections like fluconazole, itraconazole, ketoconazole, posaconazole, voriconazole  · cisapride  · dronedarone  · pimozide  · thioridazine  This medicine may also interact with the following medications:  · carbamazepine  · certain medicines for depression, anxiety, or psychotic disturbances  · fentanyl  · linezolid  · MAOIs like Carbex, Eldepryl, Marplan, Nardil, and Parnate  · methylene blue (injected into a vein)  · other medicines that prolong the QT interval (cause an abnormal heart rhythm) like dofetilide, ziprasidone  · phenytoin  · rifampicin  · tramadol  This list may not describe all possible interactions. Give your health care provider a list of all the medicines, herbs, non-prescription drugs, or dietary supplements you use. Also tell them if  you smoke, drink alcohol, or use illegal drugs. Some items may interact with your medicine.  What should I watch for while using this medicine?  Check with your doctor or health care professional right away if you have any sign of an allergic reaction.  What side effects may I notice from receiving this medicine?  Side effects that you should report to your doctor or health care professional as soon as possible:  · allergic reactions like skin rash, itching or hives, swelling of the face, lips or tongue  · breathing problems  · confusion  · dizziness  · fast or irregular heartbeat  · feeling faint or lightheaded, falls  · fever and chills  · loss of balance or coordination  · seizures  · sweating  · swelling of the hands or feet  · tightness in the chest  · tremors  · unusually weak or tired  Side effects that usually do not require medical attention (report to your doctor or health care professional if they continue or are bothersome):  · constipation or diarrhea  · headache  This list may not describe all possible side effects. Call your doctor for medical advice about side effects. You may report side effects to FDA at 2-786-QRX-6962.  Where should I keep my medicine?  Keep out of the reach of children.  Store between 2 and 30 degrees C (36 and 86 degrees F). Throw away any unused medicine after the expiration date.  NOTE: This sheet is a summary. It may not cover all possible information. If you have questions about this medicine, talk to your doctor, pharmacist, or health care provider.  © 2020 Elsevier/Gold Standard (2019-12-10 07:16:43)

## 2020-07-10 NOTE — OR NURSING
1355: Assumed care of patient from Anna ARMENDARIZ. Patient is pain and nausea free. He has been tolerating room air for awhile.     1405: Meets criteria to transfer back to the floor. KALA New Given report for room 312, bed 2.     1415: Report given to KALA New, Patient resting on gurney, no change. Transport en route.

## 2020-07-10 NOTE — DOCUMENTATION QUERY
[facility]                                                                       Query Response Note      PATIENT:               BRISEYDA CHAUHAN  ACCT #:                  [id number]  MRN:                     [id number]  :                      1955  ADMIT DATE:       2020 1:32 PM  DISCH DATE:          RESPONDING  PROVIDER #:        [id number]           QUERY TEXT:    Acute encephalopathy is documented in the Medical Record. Please specify type.    NOTE:  If an appropriate response is not listed below, please respond with a new note.    The patient's Clinical Indicators include:  - Findings:  Altered mental status and Acute undefined encephalopathy possibly due to seizures per notes and H&P. epilepsy, cannabis dependence and abnormal electrolytes   - Treatments:  correct electrolyte disturbances, repeat labs, GI consult, check random urine phosphorus and potassium, encourage cannabis cessation, resume seizure medications   - Risk factors:  encephalopathy, epilepsy, hypokalemia, Hypophosphatemia, possible marijuana hyperemesis syndrome  Options provided:   -- Due to medications or drugs   -- Metabolic encephalopathy   -- Toxic encephalopathy   -- Other type of encephalopathy   -- Unable to determine      Query created by: Imelda Hanson on 7/10/2020 7:44 AM    RESPONSE TEXT:    Other type of encephalopathy          Electronically signed by:  DAVINA PEPPER MD 7/10/2020 9:18 AM

## 2020-07-10 NOTE — OR NURSING
1338 Pt arrived from Endo, s/pt gastroscopy.  Report received from anesthesia and OR RN. Simple mask to 6L O2 in place, breath sounds clear bilaterally. Pt not arousing to voice at this time, appears comfortable with unlabored breathing.   1350 Pt awake, denies pain or nausea. Dr Yen spoke to pt at bedside. Report to KALA Jimenes

## 2020-07-10 NOTE — CARE PLAN
Problem: Safety  Goal: Will remain free from injury  Outcome: PROGRESSING AS EXPECTED  Educated pt to use the call light when getting out of bed. Educated pt about using the bed alarm at night to prevent falls. Pt verbalized an understanding of teaching.       Problem: Knowledge Deficit  Goal: Knowledge of disease process/condition, treatment plan, diagnostic tests, and medications will improve  Outcome: PROGRESSING AS EXPECTED  Educated pt about POC and NPO orders after midnight for procedure tomorrow. Pt verbalizes understanding of teaching.

## 2020-07-10 NOTE — OR NURSING
Into pre op educated on pre op procedures and event schedule pt denies pain or nausea at present   1240 Tolerated all pre op procedures well

## 2020-07-10 NOTE — ANESTHESIA TIME REPORT
Anesthesia Start and Stop Event Times     Date Time Event    7/10/2020 1103 Ready for Procedure     1313 Anesthesia Start     1341 Anesthesia Stop        Responsible Staff  07/10/20    Name Role Begin End    Adrian Nunez M.D. Anesth 1313 1341        Preop Diagnosis (Free Text):  Pre-op Diagnosis     vomiting;  diarrhea; hypophosphatemia        Preop Diagnosis (Codes):    Post op Diagnosis  Vomiting      Premium Reason  Non-Premium    Comments:

## 2020-07-10 NOTE — PROGRESS NOTES
"Wife Apple at bedside stating \"you guys don't know what's going on, you were trying to send him home, he needs to see a GI doctor. GI at bedside shortly there after.     1600 Pt BP high in need of PRN. Wife stated \"don't give that to him, the last time he got a blood pressure medication, he had a seizure. If his blood pressure is low you need to give him more phosphorus\" Educated pt and wife about hydralazine and risks of high blood pressure. Pt and wife agreeable to PRN administration. Also reviewed current lab values    1730 Wife asking staff \" why is he on a cardiac diet? He hasn't eaten in weeks, if the food doesn't have any flavor, he isn't going to want to eat it.\" Pt and wife educated about benefits of cardiac diet. Pt states he isn't hungry, would like to try a vanilla ice cream. Ice cream provided to patient. PT and wife also educated about EGD and being NPO after midnight, all questions answered.       "

## 2020-07-10 NOTE — PROGRESS NOTES
Pt c/o mouth being dry. Asking for something to drink. Re-educated about NPO status prior to procedure. Pt verbalized understanding. Asking to leave after procedure. RN educated pt that MD may want to keep patient for observation after.

## 2020-07-10 NOTE — PROGRESS NOTES
Wife Apple called for update. Updated on time for EGD, reviewed lab values. All questions answered at this time.

## 2020-07-10 NOTE — PROGRESS NOTES
Gastroenterology Progress Note     Author: Chad Yen M.D.   Date & Time Created: 7/10/2020 1:33 PM    Chief Complaint:  Vomiting    History of Presenting Illness  64 y.o. male with history of seizure disorder, hypophosphatemia, hyperlipidemia, ADHD who presented 7/8/2020 with recurrent nausea and vomiting with electrolyte disturbances.     He notes developing episodic symptoms of recurrent severe vomiting episodes beginning last September 2019.  He has required 4 admissions for similar symptoms.  He has been diagnosed with gastroenteritis in the past.  He typically presents with vomiting leading to dehydration with associated low potassium and magnesium and phosphorus levels and occasionally seizures and encephalopathy.  He presented for similar symptoms yesterday.     He describes episodes as acute onset of vomiting typically without preceding nausea early in the morning after awakening.  He will have persistent bilious emesis until he has dry heaves and is unable to keep down any medications, liquids or foods.  Symptoms last for several hours up to a couple of days.  He denies associated abdominal pain or diarrhea typically although had some diarrhea this time.  He denies preceding auras.  Has not tried hot showers for symptoms.  He sometimes will get symptoms that can be aborted with Zofran but not always.  Denies associated weight loss.  Denies new medications.  He does note smoking marijuana twice daily for several years for anxiety/ADHD issues to help him relax.     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  He has had chronically low phosphorus and has been seeing endocrinologist without clear cause.  Has not seen nephrology.     Interval History:  7/10/2020:  Feels much better today.  No vomiting.  No diarrhea.  Hungry.    Review of Systems:  Review of Systems   Constitutional: Negative.    Respiratory: Negative.    Cardiovascular:  Negative.    Gastrointestinal: Negative.        Physical Exam:  Physical Exam  Vitals signs and nursing note reviewed.   Constitutional:       General: He is not in acute distress.     Appearance: Normal appearance. He is not ill-appearing or toxic-appearing.   Cardiovascular:      Rate and Rhythm: Normal rate and regular rhythm.      Pulses: Normal pulses.      Heart sounds: Normal heart sounds.   Pulmonary:      Effort: Pulmonary effort is normal.      Breath sounds: Normal breath sounds.   Abdominal:      General: Abdomen is flat. Bowel sounds are normal. There is no distension.      Palpations: Abdomen is soft. There is no mass.      Tenderness: There is no abdominal tenderness. There is no guarding or rebound.      Hernia: No hernia is present.   Neurological:      Mental Status: He is alert.         Labs:          Recent Labs     07/08/20 1340 07/09/20  0349 07/09/20  0809 07/09/20  1226 07/10/20  0418   SODIUM 137  --  142  --   --  142   POTASSIUM 2.6*   < > 2.8* 2.9* 3.1* 3.3*   CHLORIDE 95*  --  103  --   --  107   CO2 22  --  25  --   --  21   BUN 14  --  10  --   --  11   CREATININE 1.22  --  1.07  --   --  1.06   MAGNESIUM  --   --  1.7  --   --  2.2   PHOSPHORUS 0.6*   < > 3.4 2.8 2.2* 2.0*   CALCIUM 9.3  --  8.1*  --   --  8.9    < > = values in this interval not displayed.     Recent Labs     07/08/20  1340 07/09/20  0349 07/10/20  0418   ALTSGPT 13 9 9   ASTSGOT 16 11* 12   ALKPHOSPHAT 149* 122* 132*   TBILIRUBIN 1.2 0.8 0.8   GLUCOSE 164* 96 97     Recent Labs     07/08/20  1340 07/09/20  0349 07/10/20  0418   RBC 5.31 4.44* 4.64*   HEMOGLOBIN 16.8 14.1 14.8   HEMATOCRIT 46.8 41.2* 42.3   PLATELETCT 206 150* 151*     Recent Labs     07/08/20  1340 07/09/20  0349 07/10/20  0418   WBC 14.6* 9.6 8.6   NEUTSPOLYS 83.40* 67.40  --    LYMPHOCYTES 8.70* 23.80  --    MONOCYTES 7.00 8.20  --    EOSINOPHILS 0.00 0.00  --    BASOPHILS 0.20 0.30  --    ASTSGOT 16 11* 12   ALTSGPT 13 9 9   ALKPHOSPHAT 149*  122* 132*   TBILIRUBIN 1.2 0.8 0.8     Hemodynamics:  Temp (24hrs), Av.1 °C (98.8 °F), Min:36.7 °C (98 °F), Max:37.3 °C (99.1 °F)  Temperature: 36.7 °C (98 °F)  Pulse  Av.9  Min: 58  Max: 69   Blood Pressure : (Abnormal) 165/83(rn notified)     Respiratory:    Respiration: 18, Pulse Oximetry: 98 %        RUL Breath Sounds: Clear, RML Breath Sounds: Clear;Diminished, RLL Breath Sounds: Clear, SANTY Breath Sounds: Clear, LLL Breath Sounds: Diminished;Clear  Fluids:  No intake or output data in the 24 hours ending 07/10/20 1333     GI/Nutrition:  Orders Placed This Encounter   Procedures   • Diet NPO     Standing Status:   Standing     Number of Occurrences:   8     Order Specific Question:   Restrict to:     Answer:   Sips with Medications [3]     Medical Decision Making, by Problem:  Active Hospital Problems    Diagnosis   • *Seizure (HCC) [R56.9]   • Acute encephalopathy [G93.40]   • Intractable vomiting with nausea [R11.2]   • Hypophosphatemia [E83.39]   • Hypokalemia [E87.6]   • Tetrahydrocannabinol (THC) dependence (HCC) [F12.20]     Assessment/Plan  63 y/o with seizure disorder, hypophosphatemia, ADHD, chronic marijuana use that presented for recurrent vomiting with associated electrolyte disturbances.  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  This was discussed with he and his wife although unclear that they believe this diagnosis.  I did encourage them to research this on [employer] or [employer] as well.  Also need to rule out structural upper GI issues.     PROBLEMS:  1. Recurrent vomiting.  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  EGD with erosive esophagitis and small hiatal hernia.  Esophageal, gastric, duodenal biopsies obtained.  2. Diarrhea.  Resolved.  3. Hypophosphatemia  4. Hypokalemia  5. Hypomagnesemia  6. Seizure disorder  7. Anxiety/ADHD  8. Chronic marijuana use daily  9. Erosive  esophagitis likely from vomiting  10. Small hiatal hernia    Plan:  1. Advance diet  2. PPI daily  3. Sucralfate 1 gram QID for 2 weeks  4. Recommend marijuana cessation.  OK to use CBD products  5. Consider dissolvable Zofran or Phenergan suppositories during acute episodes to abort episodes.  If requires ED evaluation, consider IV haldol which helps with cyclic vomiting  6. Consider low dose TCA for prevention as well as Coenzyme Q10 but only if symptoms persist after stopping marijuana  7. GI to sign off.  Will arrange follow-up in GI clinic in 4-6 weeks.      Quality-Core Measures   Reviewed items::  Medications reviewed, Labs reviewed and Radiology images reviewed

## 2020-07-10 NOTE — PROGRESS NOTES
Telemetry Shift Summary     Rhythm: SR-SB  HR Range: 45-90  Ectopy: Per Monitor Tech Diego: rare PVCs       Measurements for strip printed 7/10/20 at 3:03:   HR 58    0.16/0.10/0.46           Normal Values  Rhythm SR  HR Range    Measurements 0.12-0.20 / 0.06-0.10  / 0.30-0.52

## 2020-07-10 NOTE — PROCEDURES
DATE OF SERVICE:  07/10/2020    PROCEDURE:  Upper endoscopy with biopsies.    :  Chad Yen MD    INDICATION:  The patient is a 64-year-old gentleman who presents for recurrent   episodes of vomiting consistent with cyclic vomiting syndrome in the setting   of chronic marijuana use.    INFORMED CONSENT:  Written informed consent was obtained from the patient   after thorough explanation of the indications, benefits, and risks of the   procedure, which included but was not limited to bleeding, infection,   perforation, adverse reaction to medications and missed lesions.    MEDICATIONS GIVEN:  General anesthesia with endotracheal intubation.    Continuous telemetry, BP, pulse oximetry, and capnography were performed by   the anesthesiologist throughout the procedure.    A midsize flexible upper endoscope was used for the procedure.    FINDINGS:  Patient was placed in the left lateral decubitus position.  After   anesthesia was achieved, the endoscope was passed into the posterior pharynx   under direct visualization and advanced to second portion of the duodenum   without difficulty.  The patient tolerated procedure well with following   findings:  1.  Esophagus:  He did have evidence of LA grade D erosive esophagitis   extending from the GE junction up to 28 cm in the mid to proximal esophagus.    Suspect this is related to recent vomiting.  Biopsies obtained.  2.  Stomach:  He had a small 2 cm hiatal hernia.  Remainder of the gastric   mucosa appeared normal.  Random biopsies for H. pylori obtained.  3.  Duodenum normal.  Random biopsies obtained for celiac disease.    IMPRESSION:  1.  Erosive esophagitis, suspect related to recent vomiting.  2.  Small hiatal hernia.    PLAN:  1.  Await biopsies.  2.  PPI daily.  3.  Sucralfate 1 g 4 times daily for 2 weeks.  4.  Advance diet.  5.  Marijuana cessation.  6.  Antiemetics as needed.  7.  GI to sign off.  We will arrange followup in GI clinic in 4-6  krysten.       ____________________________________     MD CARLO SANDS / AYALA    DD:  07/10/2020 13:40:45  DT:  07/10/2020 13:56:39    D#:  [id number]  Job#:  [id number]

## 2020-07-10 NOTE — CARE PLAN
Problem: Communication  Goal: The ability to communicate needs accurately and effectively will improve  Outcome: PROGRESSING AS EXPECTED  Note: Pt verbalizes understanding plan of care. Voices concerns when any are present.      Problem: Safety  Goal: Will remain free from injury  Outcome: PROGRESSING AS EXPECTED  Note: Safety education provided. Pt verbalizes understanding. Call light and personal belongings within reach.   Goal: Will remain free from falls  Outcome: PROGRESSING AS EXPECTED  Note: Pt verbalizes understanding of fall risk. Call light within reach. Non skid socks. Calls when requiring assistance.

## 2020-07-10 NOTE — ANESTHESIA PREPROCEDURE EVALUATION
Relevant Problems   NEURO   (+) Seizure (HCC)      CARDIAC   (+) Hypertension       Physical Exam    Airway   Mallampati: II  TM distance: >3 FB  Neck ROM: full       Cardiovascular - normal exam  Rhythm: regular  Rate: normal  (-) murmur     Dental - normal exam           Pulmonary - normal exam  Breath sounds clear to auscultation     Abdominal    Neurological - normal exam                 Anesthesia Plan    ASA 3   ASA physical status 3 criteria: alcohol and/or substance dependence or abuse    Plan - general       Airway plan will be ETT        Induction: intravenous    Postoperative Plan: Postoperative administration of opioids is intended.    Pertinent diagnostic labs and testing reviewed    Informed Consent:    Anesthetic plan and risks discussed with patient.    Use of blood products discussed with: patient whom consented to blood products.

## 2020-07-10 NOTE — ANESTHESIA PROCEDURE NOTES
Airway    Date/Time: 7/10/2020 1:13 PM  Performed by: Adrian Nunez M.D.  Authorized by: Adrian Nunez M.D.     Location:  OR  Urgency:  Elective  Indications for Airway Management:  Anesthesia      Spontaneous Ventilation: absent    Sedation Level:  Deep  Preoxygenated: Yes    Patient Position:  Sniffing  Final Airway Type:  Endotracheal airway  Final Endotracheal Airway:  ETT  Cuffed: Yes    Technique Used for Successful ETT Placement:  Direct laryngoscopy    Insertion Site:  Oral  Blade Type:  Buddy  Laryngoscope Blade/Videolaryngoscope Blade Size:  3  ETT Size (mm):  7.0  Measured from:  Teeth  ETT to Teeth (cm):  20  Placement Verified by: auscultation and capnometry    Cormack-Lehane Classification:  Grade I - full view of glottis  Number of Attempts at Approach:  1

## 2020-07-10 NOTE — PROGRESS NOTES
Received shift handoff report from KALA Solomon. Pt is in bed and stable. Bed locked and in lowest position, upper side rails up, call light in reach, whiteboard updated. POC discussed and pt verbalizes understanding. Will continue to monitor.

## 2020-07-10 NOTE — ANESTHESIA POSTPROCEDURE EVALUATION
Patient: Roby JIMENEZ [place] II    Procedure Summary     Date:  07/10/20 Room / Location:   ENDOSCOPIC ULTRASOUND ROOM / SURGERY [facility]    Anesthesia Start:  1313 Anesthesia Stop:      Procedure:  GASTROSCOPY (Abdomen) Diagnosis:  (vomiting;  diarrhea; hypophosphatemia)    Surgeon:  Chad Yen M.D. Responsible Provider:  Adrian Nunez M.D.    Anesthesia Type:  general ASA Status:  3          Final Anesthesia Type: general  Last vitals  BP   Blood Pressure : (!) 165/83(rn notified)    Temp   36.7 °C (98 °F)    Pulse   Pulse: 66   Resp   18    SpO2   98 %      Anesthesia Post Evaluation    Patient location during evaluation: PACU  Patient participation: complete - patient participated  Level of consciousness: awake and alert  Pain score: 0    Airway patency: patent  Anesthetic complications: no  Cardiovascular status: hemodynamically stable  Respiratory status: acceptable  Hydration status: euvolemic    PONV: none           Nurse Pain Score: 0 (NPRS)

## 2020-07-11 NOTE — DISCHARGE SUMMARY
Hospital Medicine Discharge Note     Admit Date:  7/8/2020       Discharge Date:   7/10/2020    Attending Physician:  Graham Tripathi M.D.     Diagnoses (includes active and resolved):   Principal Problem:    Seizure (HCC) POA: Yes  Active Problems:    Hypophosphatemia POA: Yes    Hypokalemia POA: Yes    Cannabis hyperemesis syndrome concurrent with and due to cannabis dependence (HCC) POA: Unknown    Acute encephalopathy POA: Unknown    Tetrahydrocannabinol (THC) dependence (HCC) POA: Yes    Esophagitis, erosive POA: Unknown  Resolved Problems:    * No resolved hospital problems. *      Hospital Summary (Brief Narrative):       64 y.o. 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 Reportedly the patient had abnormal jerking movement of his extremities and loss consciousness, according to the wife this often happens when his phosphorus level is very low.   He was admitted for work-up.  [facility] Dr. Yen was consulted.  Further history was elicited.  Patient has had multiple episodes of hypophosphatemia in the past.  He also has had multiple episodes of hyperemesis which then resolved and are very consistent with cyclic vomiting syndrome.  Thus they were counseled on DC THC which they agreed to do and they will slowly taper off of it.  He was taken for EGD and he was found to have erosive esophagitis.  Thus he was started on omeprazole as well as short course of sucralfate.  He also has renal phosphate wasting syndrome.  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  The case was discussed with nephro Nylk who is open to the patient establishing with her outpatient.  In the meantime, patient's outpatient supplementation of potassium possibly has been increased to 2 tabs 3 times per day.      The patient met  2-midnight criteria for an inpatient stay at the time of discharge.     Consultants:      [facility] Dr. Yen    Procedures:        EGD:  IMPRESSION:  1.  Erosive esophagitis, suspect related to recent vomiting.  2.  Small hiatal hernia.     PLAN:  1.  Await biopsies.  2.  PPI daily.  3.  Sucralfate 1 g 4 times daily for 2 weeks.  4.  Advance diet.  5.  Marijuana cessation.  6.  Antiemetics as needed.  7.  GI to sign off.  We will arrange followup in GI clinic in 4-6 weeks.    Discharge Medications:           Medication List      START taking these medications      Instructions   omeprazole 20 MG delayed-release capsule  Commonly known as:  PRILOSEC   Take 1 Cap by mouth every day.  Dose:  20 mg     ondansetron 4 MG Tabs tablet  Commonly known as:  Zofran   Take 1 Tab by mouth every four hours as needed for Nausea/Vomiting.  Dose:  4 mg     sucralfate 1 GM Tabs  Commonly known as:  CARAFATE   Take 1 Tab by mouth 3 times a day before meals for 14 days.  Dose:  1 g        CHANGE how you take these medications      Instructions   phosphorus [id number] MG tablet  What changed:  when to take this  Commonly known as:  K-PHOS-NEUTRAL   Take 2 Tabs by mouth 3 times a day.  Dose:  2 Tab        CONTINUE taking these medications      Instructions   atorvastatin 40 MG Tabs  Commonly known as:  LIPITOR   Take 40 mg by mouth every evening.  Dose:  40 mg     carBAMazepine 200 MG Tabs  Commonly known as:  TEGRETOL   Take 400 mg by mouth every evening.  Dose:  400 mg     escitalopram 20 MG tablet  Commonly known as:  LEXAPRO   Take 20 mg by mouth every evening.  Dose:  20 mg     QUEtiapine 25 MG Tabs  Commonly known as:  SEROQUEL   Take 25 mg by mouth every evening.  Dose:  25 mg          Disposition:   Discharge home    Activity:   As tolerated    Code status:   Full code    Primary Care Provider:    Pcp Pt States None    Follow up appointment details :      Mirian Boogie M.D.  [address]  [address]  [place]  [id number]  [phone]    Schedule an appointment as soon as possible for a visit  suad Yen M.D.  [id number] [place] [address]  [phone]    Call  As needed    No future appointments.    Pending Studies:        Consider repeat EGD in the future to test for resolution    Time spent on discharge day patient visit: 49 minutes    #################################################    Interval history/exam for day of discharge:    Vitals:    07/10/20 1415 07/10/20 1430 07/10/20 1500 07/10/20 1537   BP: (!) 168/79 142/103 159/83 (!) 164/88   Pulse:  67 69 62   Resp: 18 17 19 16   Temp: 36.6 °C (97.9 °F)   36.9 °C (98.4 °F)   TempSrc: Temporal   Oral   SpO2: 99% 96% 98% 97%   Weight:       Height:         Weight/BMI: Body mass index is 23.82 kg/m².  Pulse Oximetry: 97 %, O2 (LPM): 0, O2 Delivery Device: None - Room Air    General:  NAD  CVS:  RRR  PULM:  CTAB, no respiratory distress    Most Recent Labs:    Lab Results   Component Value Date/Time    WBC 8.6 07/10/2020 04:18 AM    RBC 4.64 (L) 07/10/2020 04:18 AM    HEMOGLOBIN 14.8 07/10/2020 04:18 AM    HEMATOCRIT 42.3 07/10/2020 04:18 AM    MCV 91.2 07/10/2020 04:18 AM    MCH 31.9 07/10/2020 04:18 AM    MCHC 35.0 07/10/2020 04:18 AM    MPV 9.6 07/10/2020 04:18 AM    NEUTSPOLYS 67.40 07/09/2020 03:49 AM    LYMPHOCYTES 23.80 07/09/2020 03:49 AM    MONOCYTES 8.20 07/09/2020 03:49 AM    EOSINOPHILS 0.00 07/09/2020 03:49 AM    BASOPHILS 0.30 07/09/2020 03:49 AM      Lab Results   Component Value Date/Time    SODIUM 142 07/10/2020 04:18 AM    POTASSIUM 3.3 (L) 07/10/2020 04:18 AM    CHLORIDE 107 07/10/2020 04:18 AM    CO2 21 07/10/2020 04:18 AM    GLUCOSE 97 07/10/2020 04:18 AM    BUN 11 07/10/2020 04:18 AM    CREATININE 1.06 07/10/2020 04:18 AM      Lab Results   Component Value Date/Time    ALTSGPT 9 07/10/2020 04:18 AM    ASTSGOT 12 07/10/2020 04:18 AM    ALKPHOSPHAT 132 (H) 07/10/2020 04:18 AM    TBILIRUBIN 0.8 07/10/2020 04:18 AM     LIPASE 19 11/14/2019 05:31 PM    ALBUMIN 3.7 07/10/2020 04:18 AM    ALBUMIN 4.36 11/15/2019 03:51 AM    GLOBULIN 2.6 07/10/2020 04:18 AM     No results found for: PROTHROMBTM, INR     Imaging/ Testing:      CT-HEAD W/O   Final Result      No acute intracranial abnormality is identified.      Left temporal craniotomy with underlying encephalomalacia          Instructions:      The patient was instructed to return to the ER in the event of worsening symptoms. I have counseled the patient on the importance of compliance and the patient has agreed to proceed with all medical recommendations and follow up plan indicated above.   The patient understands that all medications come with benefits and risks. Risks may include permanent injury or death and these risks can be minimized with close reassessment and monitoring.

## 2020-07-12 LAB
BACTERIA STL CULT: NORMAL
SIGNIFICANT IND 70042: NORMAL
SITE SITE: NORMAL
SOURCE SOURCE: NORMAL

## 2020-07-14 LAB
COLLECT DURATION TIME SPEC: NORMAL HRS
CREAT 24H UR-MCNC: 198 MG/DL
CREAT 24H UR-MRATE: NORMAL MG/D (ref 800–2100)
PHOSPHATE 24H UR-MCNC: 21 MG/DL
PHOSPHATE 24H UR-MRATE: NORMAL MG/D (ref 400–1300)
PHOSPHATE/CREAT 24H UR: 106 MG/G
TOTAL VOLUME 1105: NORMAL ML

## 2020-07-23 ENCOUNTER — OFFICE VISIT (OUTPATIENT)
Dept: NEPHROLOGY | Facility: MEDICAL CENTER | Age: 65
End: 2020-07-23
Payer: MEDICARE

## 2020-07-23 VITALS
SYSTOLIC BLOOD PRESSURE: 126 MMHG | BODY MASS INDEX: 23.84 KG/M2 | HEIGHT: 72 IN | WEIGHT: 176 LBS | RESPIRATION RATE: 16 BRPM | OXYGEN SATURATION: 96 % | DIASTOLIC BLOOD PRESSURE: 70 MMHG | HEART RATE: 59 BPM | TEMPERATURE: 98.7 F

## 2020-07-23 DIAGNOSIS — N18.2 CKD (CHRONIC KIDNEY DISEASE), STAGE II: ICD-10-CM

## 2020-07-23 DIAGNOSIS — E87.6 HYPOKALEMIA: ICD-10-CM

## 2020-07-23 DIAGNOSIS — E83.39 HYPOPHOSPHATEMIA: ICD-10-CM

## 2020-07-23 PROCEDURE — 99203 OFFICE O/P NEW LOW 30 MIN: CPT | Performed by: INTERNAL MEDICINE

## 2020-07-23 ASSESSMENT — ENCOUNTER SYMPTOMS
ORTHOPNEA: 0
SHORTNESS OF BREATH: 0
FEVER: 0
ABDOMINAL PAIN: 0
HEADACHES: 0
DIARRHEA: 0
BACK PAIN: 0
FOCAL WEAKNESS: 0
HEMOPTYSIS: 0
NAUSEA: 0
MYALGIAS: 0
NECK PAIN: 0
EYES NEGATIVE: 1
PALPITATIONS: 0
DIZZINESS: 0
WEIGHT LOSS: 0
FLANK PAIN: 0
WHEEZING: 0
VOMITING: 0
COUGH: 0
SINUS PAIN: 0
SENSORY CHANGE: 0
CHILLS: 0

## 2020-07-23 ASSESSMENT — FIBROSIS 4 INDEX: FIB4 SCORE: 1.75

## 2020-07-24 NOTE — PROGRESS NOTES
Subjective:      Roby Viramontes II is a 64 y.o. male who presents with New Patient and Chronic Kidney Disease            HPI  Roby is coming for initial evaluation of hypokalemia, hypophosphatemia   Hospitalized couple of times over past several time with N/V-found low k and low phos level  Found elevated Phos level in 24 H urine collection  Mg well controlled  CKD II creat stable    Review of Systems   Constitutional: Negative for chills, fever, malaise/fatigue and weight loss.   HENT: Negative for congestion, hearing loss and sinus pain.    Eyes: Negative.    Respiratory: Negative for cough, hemoptysis, shortness of breath and wheezing.    Cardiovascular: Negative for chest pain, palpitations, orthopnea and leg swelling.   Gastrointestinal: Negative for abdominal pain, diarrhea, nausea and vomiting.   Genitourinary: Negative for dysuria, flank pain, frequency, hematuria and urgency.   Musculoskeletal: Negative for back pain, joint pain, myalgias and neck pain.   Skin: Negative.    Neurological: Negative for dizziness, sensory change, focal weakness and headaches.     Past Medical History:   Diagnosis Date   • Dyslipidemia    • Hypertension    • Hypophosphatemia    • Psychiatric problem     ADHD/ depression   • Seizure disorder (HCC)        History reviewed. No pertinent family history.    Social History     Socioeconomic History   • Marital status:      Spouse name: Not on file   • Number of children: Not on file   • Years of education: Not on file   • Highest education level: Not on file   Occupational History   • Not on file   Social Needs   • Financial resource strain: Not on file   • Food insecurity     Worry: Patient refused     Inability: Patient refused   • Transportation needs     Medical: Patient refused     Non-medical: Patient refused   Tobacco Use   • Smoking status: Former Smoker     Types: Cigarettes     Last attempt to quit: 1990     Years since quittin.5   • Smokeless  tobacco: Never Used   • Tobacco comment: current alton smoker    Substance and Sexual Activity   • Alcohol use: Not Currently   • Drug use: Yes     Types: Marijuana, Inhaled     Comment: marijuana   • Sexual activity: Not on file   Lifestyle   • Physical activity     Days per week: Not on file     Minutes per session: Not on file   • Stress: Not on file   Relationships   • Social connections     Talks on phone: Not on file     Gets together: Not on file     Attends Mor[employer] service: Not on file     Active member of club or organization: Not on file     Attends meetings of clubs or organizations: Not on file     Relationship status: Not on file   • Intimate partner violence     Fear of current or ex partner: Not on file     Emotionally abused: Not on file     Physically abused: Not on file     Forced sexual activity: Not on file   Other Topics Concern   • Not on file   Social History Narrative   • Not on file          Objective:     /70 (BP Location: Right arm, Patient Position: Sitting, BP Cuff Size: Adult)   Pulse (!) 59   Temp 37.1 °C (98.7 °F) (Temporal)   Resp 16   Ht 1.829 m (6')   Wt 79.8 kg (176 lb)   SpO2 96%   BMI 23.87 kg/m²      Physical Exam  Constitutional:       General: He is not in acute distress.     Appearance: He is well-developed. He is not diaphoretic.   HENT:      Head: Normocephalic and atraumatic.      Nose: Nose normal.   Eyes:      Extraocular Movements: Extraocular movements intact.      Conjunctiva/sclera: Conjunctivae normal.      Pupils: Pupils are equal, round, and reactive to light.   Neck:      Musculoskeletal: Normal range of motion and neck supple.   Cardiovascular:      Rate and Rhythm: Normal rate and regular rhythm.      Pulses: Normal pulses.      Heart sounds: Normal heart sounds. No friction rub. No gallop.    Pulmonary:      Effort: Pulmonary effort is normal. No respiratory distress.      Breath sounds: Rhonchi present. No wheezing or rales.   Abdominal:       General: Bowel sounds are normal. There is no distension.      Palpations: Abdomen is soft. There is no mass.      Tenderness: There is no abdominal tenderness. There is no right CVA tenderness or left CVA tenderness.   Skin:     General: Skin is warm.      Findings: No erythema or rash.   Neurological:      General: No focal deficit present.      Mental Status: He is alert and oriented to person, place, and time.      Cranial Nerves: No cranial nerve deficit.      Coordination: Coordination normal.            Laboratory results reviewed: d/w Pt  Lab Results   Component Value Date/Time    CREATININE 1.06 07/10/2020 04:18 AM    POTASSIUM 3.3 (L) 07/10/2020 04:18 AM     Phos 2.0     Assessment/Plan:       1. Hypophosphatemia     Likely tubular defect -continue KPhos  - PHOSPHORUS; Future    2. Hypokalemia      Continue supplementation  - Basic Metabolic Panel; Future  - MAGNESIUM; Future    3. CKD (chronic kidney disease), stage II      stable  - Basic Metabolic Panel; Future    Recs:  Keep well hydrated  Reduce marijuana  Complete BMP, Phosphorous  F/u in 1 month    Thank you for the consult

## 2020-07-28 ENCOUNTER — TELEPHONE (OUTPATIENT)
Dept: NEPHROLOGY | Facility: MEDICAL CENTER | Age: 65
End: 2020-07-28

## 2020-07-28 NOTE — TELEPHONE ENCOUNTER
Patients wife called to let you know that Roby had to take a Covid-19 test yesterday (he does not have any symptoms) and have not received the results yet. Because they haven't received the results, the lab is not letting them go in to get the labs that you ordered done. His wife would like to speak with you about this. Can you please give her a call?  [employer]: [phone]    Thank you

## 2020-07-29 ENCOUNTER — TELEPHONE (OUTPATIENT)
Dept: NEPHROLOGY | Facility: MEDICAL CENTER | Age: 65
End: 2020-07-29

## 2020-07-29 NOTE — TELEPHONE ENCOUNTER
[facility] Can you please review them? They are scanned in Media.     Thank you

## 2020-07-31 DIAGNOSIS — E87.6 HYPOKALEMIA: ICD-10-CM

## 2020-07-31 DIAGNOSIS — E83.39 HYPOPHOSPHATEMIA: ICD-10-CM

## 2020-07-31 RX ORDER — POTASSIUM CHLORIDE 20 MEQ/1
40 TABLET, EXTENDED RELEASE ORAL DAILY
Qty: 30 TAB | Refills: 3 | Status: SHIPPED | OUTPATIENT
Start: 2020-07-31 | End: 2020-09-10

## 2020-08-03 ENCOUNTER — HOSPITAL ENCOUNTER (OUTPATIENT)
Dept: LAB | Facility: MEDICAL CENTER | Age: 65
End: 2020-08-03
Attending: INTERNAL MEDICINE
Payer: MEDICARE

## 2020-08-03 DIAGNOSIS — E83.39 HYPOPHOSPHATEMIA: ICD-10-CM

## 2020-08-03 DIAGNOSIS — E87.6 HYPOKALEMIA: ICD-10-CM

## 2020-08-03 LAB
ANION GAP SERPL CALC-SCNC: 12 MMOL/L (ref 7–16)
BUN SERPL-MCNC: 5 MG/DL (ref 8–22)
CALCIUM SERPL-MCNC: 9 MG/DL (ref 8.5–10.5)
CHLORIDE SERPL-SCNC: 102 MMOL/L (ref 96–112)
CO2 SERPL-SCNC: 25 MMOL/L (ref 20–33)
CREAT SERPL-MCNC: 1.1 MG/DL (ref 0.5–1.4)
GLUCOSE SERPL-MCNC: 92 MG/DL (ref 65–99)
MAGNESIUM SERPL-MCNC: 1.9 MG/DL (ref 1.5–2.5)
PHOSPHATE SERPL-MCNC: 3.3 MG/DL (ref 2.5–4.5)
POTASSIUM SERPL-SCNC: 3.8 MMOL/L (ref 3.6–5.5)
SODIUM SERPL-SCNC: 139 MMOL/L (ref 135–145)

## 2020-08-03 PROCEDURE — 84100 ASSAY OF PHOSPHORUS: CPT

## 2020-08-03 PROCEDURE — 36415 COLL VENOUS BLD VENIPUNCTURE: CPT

## 2020-08-03 PROCEDURE — 83735 ASSAY OF MAGNESIUM: CPT

## 2020-08-03 PROCEDURE — 80048 BASIC METABOLIC PNL TOTAL CA: CPT

## 2020-08-25 ENCOUNTER — OFFICE VISIT (OUTPATIENT)
Dept: NEPHROLOGY | Facility: MEDICAL CENTER | Age: 65
End: 2020-08-25
Payer: MEDICARE

## 2020-08-25 ENCOUNTER — HOSPITAL ENCOUNTER (OUTPATIENT)
Dept: LAB | Facility: MEDICAL CENTER | Age: 65
End: 2020-08-25
Attending: INTERNAL MEDICINE
Payer: MEDICARE

## 2020-08-25 VITALS
SYSTOLIC BLOOD PRESSURE: 128 MMHG | WEIGHT: 182 LBS | DIASTOLIC BLOOD PRESSURE: 72 MMHG | OXYGEN SATURATION: 98 % | HEIGHT: 72 IN | BODY MASS INDEX: 24.65 KG/M2 | TEMPERATURE: 98 F | RESPIRATION RATE: 14 BRPM | HEART RATE: 60 BPM

## 2020-08-25 DIAGNOSIS — N18.2 CKD (CHRONIC KIDNEY DISEASE), STAGE II: ICD-10-CM

## 2020-08-25 DIAGNOSIS — E83.39 HYPOPHOSPHATEMIA: ICD-10-CM

## 2020-08-25 DIAGNOSIS — E55.9 VITAMIN D DEFICIENCY: ICD-10-CM

## 2020-08-25 DIAGNOSIS — E87.6 HYPOKALEMIA: ICD-10-CM

## 2020-08-25 DIAGNOSIS — E21.1 HYPERPARATHYROIDISM DUE TO VITAMIN D DEFICIENCY (HCC): ICD-10-CM

## 2020-08-25 LAB
ANION GAP SERPL CALC-SCNC: 15 MMOL/L (ref 7–16)
BUN SERPL-MCNC: 8 MG/DL (ref 8–22)
CALCIUM SERPL-MCNC: 8.9 MG/DL (ref 8.5–10.5)
CHLORIDE SERPL-SCNC: 102 MMOL/L (ref 96–112)
CO2 SERPL-SCNC: 25 MMOL/L (ref 20–33)
CREAT SERPL-MCNC: 1.53 MG/DL (ref 0.5–1.4)
GLUCOSE SERPL-MCNC: 104 MG/DL (ref 65–99)
PHOSPHATE SERPL-MCNC: 3.8 MG/DL (ref 2.5–4.5)
POTASSIUM SERPL-SCNC: 3.6 MMOL/L (ref 3.6–5.5)
SODIUM SERPL-SCNC: 142 MMOL/L (ref 135–145)

## 2020-08-25 PROCEDURE — 36415 COLL VENOUS BLD VENIPUNCTURE: CPT

## 2020-08-25 PROCEDURE — 80048 BASIC METABOLIC PNL TOTAL CA: CPT

## 2020-08-25 PROCEDURE — 99213 OFFICE O/P EST LOW 20 MIN: CPT | Performed by: INTERNAL MEDICINE

## 2020-08-25 PROCEDURE — 84100 ASSAY OF PHOSPHORUS: CPT

## 2020-08-25 ASSESSMENT — ENCOUNTER SYMPTOMS
CHILLS: 0
SHORTNESS OF BREATH: 0
FLANK PAIN: 0
ORTHOPNEA: 0
WHEEZING: 0
EYES NEGATIVE: 1
PALPITATIONS: 0
HEMOPTYSIS: 0
FEVER: 0
NAUSEA: 0
SINUS PAIN: 0
COUGH: 0
DIARRHEA: 0
VOMITING: 0
ABDOMINAL PAIN: 0
WEIGHT LOSS: 0

## 2020-08-25 ASSESSMENT — FIBROSIS 4 INDEX: FIB4 SCORE: 1.75

## 2020-08-25 NOTE — PROGRESS NOTES
Subjective:      Roby Viramontes II is a 64 y.o. male who presents with Chronic Kidney Disease            Chronic Kidney Disease  Pertinent negatives include no abdominal pain, chest pain, chills, congestion, coughing, fever, nausea or vomiting.     Roby is coming for f/u of hypokalemia, hypophosphatemia,CKD II  Hospitalized couple of times over past several time with N/V-found low k and low phos level  Found elevated Phos level in 24 H urine collection  Mg well controlled  CKD II creat stable  Currently doing well, no complaints, no N/V  Added KCl 20 mEq daily    Review of Systems   Constitutional: Negative for chills, fever, malaise/fatigue and weight loss.   HENT: Negative for congestion, hearing loss and sinus pain.    Eyes: Negative.    Respiratory: Negative for cough, hemoptysis, shortness of breath and wheezing.    Cardiovascular: Negative for chest pain, palpitations, orthopnea and leg swelling.   Gastrointestinal: Negative for abdominal pain, diarrhea, nausea and vomiting.   Genitourinary: Negative for dysuria, flank pain, frequency, hematuria and urgency.   Skin: Negative.    All other systems reviewed and are negative.    Past Medical History:   Diagnosis Date   • Dyslipidemia    • Hypertension    • Hypophosphatemia    • Psychiatric problem     ADHD/ depression   • Seizure disorder (HCC)        History reviewed. No pertinent family history.    Social History     Socioeconomic History   • Marital status:      Spouse name: Not on file   • Number of children: Not on file   • Years of education: Not on file   • Highest education level: Not on file   Occupational History   • Not on file   Social Needs   • Financial resource strain: Not on file   • Food insecurity     Worry: Patient refused     Inability: Patient refused   • Transportation needs     Medical: Patient refused     Non-medical: Patient refused   Tobacco Use   • Smoking status: Former Smoker     Types: Cigarettes     Quit date:  1990     Years since quittin.6   • Smokeless tobacco: Never Used   • Tobacco comment: current alton smoker    Substance and Sexual Activity   • Alcohol use: Not Currently   • Drug use: Yes     Types: Marijuana, Inhaled     Comment: marijuana   • Sexual activity: Not on file   Lifestyle   • Physical activity     Days per week: Not on file     Minutes per session: Not on file   • Stress: Not on file   Relationships   • Social connections     Talks on phone: Not on file     Gets together: Not on file     Attends Hinduism service: Not on file     Active member of club or organization: Not on file     Attends meetings of clubs or organizations: Not on file     Relationship status: Not on file   • Intimate partner violence     Fear of current or ex partner: Not on file     Emotionally abused: Not on file     Physically abused: Not on file     Forced sexual activity: Not on file   Other Topics Concern   • Not on file   Social History Narrative   • Not on file          Objective:     /72 (BP Location: Right arm, Patient Position: Sitting)   Pulse 60   Temp 36.7 °C (98 °F)   Resp 14   Ht 1.829 m (6')   Wt 82.6 kg (182 lb)   SpO2 98%   BMI 24.68 kg/m²      Physical Exam  Vitals signs and nursing note reviewed.   Constitutional:       General: He is not in acute distress.     Appearance: Normal appearance. He is well-developed and normal weight. He is not diaphoretic.   HENT:      Head: Normocephalic and atraumatic.      Nose: Nose normal.      Mouth/Throat:      Mouth: Mucous membranes are moist.      Pharynx: Oropharynx is clear.   Eyes:      General: No scleral icterus.     Extraocular Movements: Extraocular movements intact.      Conjunctiva/sclera: Conjunctivae normal.      Pupils: Pupils are equal, round, and reactive to light.   Neck:      Musculoskeletal: Normal range of motion and neck supple.   Cardiovascular:      Rate and Rhythm: Normal rate and regular rhythm.      Pulses: Normal pulses.       Heart sounds: Normal heart sounds.   Pulmonary:      Effort: Pulmonary effort is normal. No respiratory distress.      Breath sounds: Normal breath sounds. No wheezing, rhonchi or rales.   Abdominal:      General: Bowel sounds are normal. There is no distension.      Palpations: Abdomen is soft. There is no mass.      Tenderness: There is no abdominal tenderness. There is no right CVA tenderness, left CVA tenderness or guarding.   Musculoskeletal: Normal range of motion.         General: No swelling.      Right lower leg: No edema.      Left lower leg: No edema.   Skin:     General: Skin is warm.      Coloration: Skin is not jaundiced.      Findings: No bruising, erythema or lesion.   Neurological:      General: No focal deficit present.      Mental Status: He is alert and oriented to person, place, and time.      Cranial Nerves: No cranial nerve deficit.      Coordination: Coordination normal.   Psychiatric:         Mood and Affect: Mood normal.         Behavior: Behavior normal.         Thought Content: Thought content normal.         Judgment: Judgment normal.            Laboratory results reviewed: d/w Pt  Lab Results   Component Value Date/Time    CREATININE 1.10 08/03/2020 10:19 AM    POTASSIUM 3.8 08/03/2020 10:19 AM     Phos 2.0     Assessment/Plan:       1. Hypophosphatemia     Likely tubular defect - well controlled - continue KPhos    2. Hypokalemia      Continue supplementation      3. CKD (chronic kidney disease), stage II      stable  - Basic Metabolic Panel; Future    Recs:  Keep well hydrated  Reduce marijuana  Complete BMP, Phosphorous  F/u in 3 months

## 2020-09-10 RX ORDER — POTASSIUM CHLORIDE 20 MEQ/1
TABLET, EXTENDED RELEASE ORAL
Qty: 30 TAB | Refills: 3 | Status: SHIPPED | OUTPATIENT
Start: 2020-09-10 | End: 2021-01-12

## 2021-01-12 RX ORDER — POTASSIUM CHLORIDE 20 MEQ/1
TABLET, EXTENDED RELEASE ORAL
Qty: 30 TAB | Refills: 3 | Status: SHIPPED | OUTPATIENT
Start: 2021-01-12 | End: 2021-03-11

## 2021-01-13 ENCOUNTER — OFFICE VISIT (OUTPATIENT)
Dept: NEPHROLOGY | Facility: MEDICAL CENTER | Age: 66
End: 2021-01-13
Payer: MEDICARE

## 2021-01-13 ENCOUNTER — HOSPITAL ENCOUNTER (OUTPATIENT)
Dept: LAB | Facility: MEDICAL CENTER | Age: 66
End: 2021-01-13
Attending: INTERNAL MEDICINE
Payer: MEDICARE

## 2021-01-13 VITALS
TEMPERATURE: 97.1 F | OXYGEN SATURATION: 96 % | BODY MASS INDEX: 26.31 KG/M2 | HEART RATE: 64 BPM | RESPIRATION RATE: 14 BRPM | SYSTOLIC BLOOD PRESSURE: 150 MMHG | DIASTOLIC BLOOD PRESSURE: 70 MMHG | WEIGHT: 194 LBS

## 2021-01-13 DIAGNOSIS — E87.6 HYPOKALEMIA: ICD-10-CM

## 2021-01-13 DIAGNOSIS — E21.1 HYPERPARATHYROIDISM DUE TO VITAMIN D DEFICIENCY (HCC): ICD-10-CM

## 2021-01-13 DIAGNOSIS — I10 ESSENTIAL HYPERTENSION: ICD-10-CM

## 2021-01-13 DIAGNOSIS — E83.39 HYPOPHOSPHATEMIA: ICD-10-CM

## 2021-01-13 DIAGNOSIS — N18.2 CKD (CHRONIC KIDNEY DISEASE), STAGE II: ICD-10-CM

## 2021-01-13 DIAGNOSIS — D64.9 ANEMIA, UNSPECIFIED TYPE: ICD-10-CM

## 2021-01-13 DIAGNOSIS — E55.9 VITAMIN D DEFICIENCY: ICD-10-CM

## 2021-01-13 LAB
25(OH)D3 SERPL-MCNC: 21 NG/ML (ref 30–100)
ANION GAP SERPL CALC-SCNC: 10 MMOL/L (ref 7–16)
BUN SERPL-MCNC: 11 MG/DL (ref 8–22)
CALCIUM SERPL-MCNC: 8.9 MG/DL (ref 8.5–10.5)
CHLORIDE SERPL-SCNC: 106 MMOL/L (ref 96–112)
CO2 SERPL-SCNC: 23 MMOL/L (ref 20–33)
CREAT SERPL-MCNC: 1.4 MG/DL (ref 0.5–1.4)
CREAT UR-MCNC: 178.36 MG/DL
ERYTHROCYTE [DISTWIDTH] IN BLOOD BY AUTOMATED COUNT: 46.5 FL (ref 35.9–50)
GLUCOSE SERPL-MCNC: 94 MG/DL (ref 65–99)
HCT VFR BLD AUTO: 40.6 % (ref 42–52)
HGB BLD-MCNC: 13.5 G/DL (ref 14–18)
MAGNESIUM SERPL-MCNC: 1.9 MG/DL (ref 1.5–2.5)
MCH RBC QN AUTO: 32.1 PG (ref 27–33)
MCHC RBC AUTO-ENTMCNC: 33.3 G/DL (ref 33.7–35.3)
MCV RBC AUTO: 96.7 FL (ref 81.4–97.8)
MICROALBUMIN UR-MCNC: 1.8 MG/DL
MICROALBUMIN/CREAT UR: 10 MG/G (ref 0–30)
PHOSPHATE SERPL-MCNC: 4.3 MG/DL (ref 2.5–4.5)
PLATELET # BLD AUTO: 190 K/UL (ref 164–446)
PMV BLD AUTO: 10.1 FL (ref 9–12.9)
POTASSIUM SERPL-SCNC: 4.4 MMOL/L (ref 3.6–5.5)
PTH-INTACT SERPL-MCNC: 361 PG/ML (ref 14–72)
RBC # BLD AUTO: 4.2 M/UL (ref 4.7–6.1)
SODIUM SERPL-SCNC: 139 MMOL/L (ref 135–145)
WBC # BLD AUTO: 6.1 K/UL (ref 4.8–10.8)

## 2021-01-13 PROCEDURE — 82570 ASSAY OF URINE CREATININE: CPT

## 2021-01-13 PROCEDURE — 83970 ASSAY OF PARATHORMONE: CPT

## 2021-01-13 PROCEDURE — 83735 ASSAY OF MAGNESIUM: CPT

## 2021-01-13 PROCEDURE — 85027 COMPLETE CBC AUTOMATED: CPT

## 2021-01-13 PROCEDURE — 82306 VITAMIN D 25 HYDROXY: CPT

## 2021-01-13 PROCEDURE — 84100 ASSAY OF PHOSPHORUS: CPT

## 2021-01-13 PROCEDURE — 99213 OFFICE O/P EST LOW 20 MIN: CPT | Performed by: INTERNAL MEDICINE

## 2021-01-13 PROCEDURE — 36415 COLL VENOUS BLD VENIPUNCTURE: CPT

## 2021-01-13 PROCEDURE — 82043 UR ALBUMIN QUANTITATIVE: CPT

## 2021-01-13 PROCEDURE — 80048 BASIC METABOLIC PNL TOTAL CA: CPT

## 2021-01-13 ASSESSMENT — ENCOUNTER SYMPTOMS
EYES NEGATIVE: 1
SINUS PAIN: 0
COUGH: 0
CHILLS: 0
WEIGHT LOSS: 0
PALPITATIONS: 0
VOMITING: 0
WHEEZING: 0
ABDOMINAL PAIN: 0
DIARRHEA: 0
HEMOPTYSIS: 0
NAUSEA: 0
ORTHOPNEA: 0
FLANK PAIN: 0
FEVER: 0
SHORTNESS OF BREATH: 0

## 2021-01-13 ASSESSMENT — FIBROSIS 4 INDEX: FIB4 SCORE: 1.77

## 2021-01-13 NOTE — PATIENT INSTRUCTIONS
To complete renal panel ASAP and call clinic to discuss results  Low salt diet  Keep well hydrated  Monitor BP and bring readings with next appointment  Vit D 5000 units daily

## 2021-01-20 ENCOUNTER — TELEPHONE (OUTPATIENT)
Dept: NEPHROLOGY | Facility: MEDICAL CENTER | Age: 66
End: 2021-01-20

## 2021-03-03 DIAGNOSIS — Z23 NEED FOR VACCINATION: ICD-10-CM

## 2021-03-11 RX ORDER — POTASSIUM CHLORIDE 20 MEQ/1
TABLET, EXTENDED RELEASE ORAL
Qty: 30 TABLET | Refills: 3 | Status: ON HOLD | OUTPATIENT
Start: 2021-03-11 | End: 2023-01-02

## 2021-03-17 ENCOUNTER — IMMUNIZATION (OUTPATIENT)
Dept: FAMILY PLANNING/WOMEN'S HEALTH CLINIC | Facility: IMMUNIZATION CENTER | Age: 66
End: 2021-03-17
Attending: INTERNAL MEDICINE
Payer: MEDICARE

## 2021-03-17 DIAGNOSIS — Z23 ENCOUNTER FOR VACCINATION: Primary | ICD-10-CM

## 2021-03-17 DIAGNOSIS — Z23 NEED FOR VACCINATION: ICD-10-CM

## 2021-03-17 PROCEDURE — 91300 PFIZER SARS-COV-2 VACCINE: CPT

## 2021-03-17 PROCEDURE — 0001A PFIZER SARS-COV-2 VACCINE: CPT

## 2021-03-25 ENCOUNTER — HOSPITAL ENCOUNTER (OUTPATIENT)
Dept: LAB | Facility: MEDICAL CENTER | Age: 66
End: 2021-03-25
Attending: INTERNAL MEDICINE
Payer: MEDICARE

## 2021-03-25 DIAGNOSIS — I10 ESSENTIAL HYPERTENSION: ICD-10-CM

## 2021-03-25 DIAGNOSIS — N18.2 CKD (CHRONIC KIDNEY DISEASE), STAGE II: ICD-10-CM

## 2021-03-25 DIAGNOSIS — E83.39 HYPOPHOSPHATEMIA: ICD-10-CM

## 2021-03-25 DIAGNOSIS — E87.6 HYPOKALEMIA: ICD-10-CM

## 2021-03-25 DIAGNOSIS — E21.1 HYPERPARATHYROIDISM DUE TO VITAMIN D DEFICIENCY (HCC): ICD-10-CM

## 2021-03-25 LAB
ANION GAP SERPL CALC-SCNC: 12 MMOL/L (ref 7–16)
BUN SERPL-MCNC: 11 MG/DL (ref 8–22)
CALCIUM SERPL-MCNC: 9.1 MG/DL (ref 8.5–10.5)
CHLORIDE SERPL-SCNC: 110 MMOL/L (ref 96–112)
CO2 SERPL-SCNC: 22 MMOL/L (ref 20–33)
CREAT SERPL-MCNC: 1.73 MG/DL (ref 0.5–1.4)
GLUCOSE SERPL-MCNC: 120 MG/DL (ref 65–99)
MAGNESIUM SERPL-MCNC: 2.1 MG/DL (ref 1.5–2.5)
PHOSPHATE SERPL-MCNC: 3.5 MG/DL (ref 2.5–4.5)
POTASSIUM SERPL-SCNC: 4.1 MMOL/L (ref 3.6–5.5)
PTH-INTACT SERPL-MCNC: 269 PG/ML (ref 14–72)
SODIUM SERPL-SCNC: 144 MMOL/L (ref 135–145)

## 2021-03-25 PROCEDURE — 84100 ASSAY OF PHOSPHORUS: CPT

## 2021-03-25 PROCEDURE — 82306 VITAMIN D 25 HYDROXY: CPT

## 2021-03-25 PROCEDURE — 36415 COLL VENOUS BLD VENIPUNCTURE: CPT

## 2021-03-25 PROCEDURE — 83970 ASSAY OF PARATHORMONE: CPT

## 2021-03-25 PROCEDURE — 80048 BASIC METABOLIC PNL TOTAL CA: CPT

## 2021-03-25 PROCEDURE — 83735 ASSAY OF MAGNESIUM: CPT

## 2021-03-29 LAB — 25(OH)D3 SERPL-MCNC: 28 NG/ML (ref 30–100)

## 2021-03-31 ENCOUNTER — OFFICE VISIT (OUTPATIENT)
Dept: NEPHROLOGY | Facility: MEDICAL CENTER | Age: 66
End: 2021-03-31
Payer: MEDICARE

## 2021-03-31 VITALS
SYSTOLIC BLOOD PRESSURE: 138 MMHG | BODY MASS INDEX: 26.9 KG/M2 | OXYGEN SATURATION: 95 % | WEIGHT: 198.6 LBS | HEIGHT: 72 IN | TEMPERATURE: 97.8 F | DIASTOLIC BLOOD PRESSURE: 84 MMHG | HEART RATE: 66 BPM

## 2021-03-31 DIAGNOSIS — E55.9 VITAMIN D DEFICIENCY: ICD-10-CM

## 2021-03-31 DIAGNOSIS — E83.39 HYPOPHOSPHATEMIA: ICD-10-CM

## 2021-03-31 DIAGNOSIS — E87.6 HYPOKALEMIA: ICD-10-CM

## 2021-03-31 DIAGNOSIS — E21.3 HYPERPARATHYROIDISM (HCC): ICD-10-CM

## 2021-03-31 DIAGNOSIS — D64.9 ANEMIA, UNSPECIFIED TYPE: ICD-10-CM

## 2021-03-31 DIAGNOSIS — N18.32 STAGE 3B CHRONIC KIDNEY DISEASE: ICD-10-CM

## 2021-03-31 DIAGNOSIS — E21.1 HYPERPARATHYROIDISM DUE TO VITAMIN D DEFICIENCY (HCC): ICD-10-CM

## 2021-03-31 PROCEDURE — 99213 OFFICE O/P EST LOW 20 MIN: CPT | Performed by: INTERNAL MEDICINE

## 2021-03-31 ASSESSMENT — ENCOUNTER SYMPTOMS
FEVER: 0
ABDOMINAL PAIN: 0
CHILLS: 0
VOMITING: 0
DIARRHEA: 0
WHEEZING: 0
FLANK PAIN: 0
HEMOPTYSIS: 0
COUGH: 0
EYES NEGATIVE: 1
SHORTNESS OF BREATH: 0
ORTHOPNEA: 0
PALPITATIONS: 0
NAUSEA: 0
SINUS PAIN: 0
WEIGHT LOSS: 0

## 2021-03-31 ASSESSMENT — FIBROSIS 4 INDEX: FIB4 SCORE: 1.41

## 2021-03-31 NOTE — PROGRESS NOTES
Subjective:      Roby Viramontes II is a 65 y.o. male who presents with Chronic Kidney Disease            Chronic Kidney Disease  Pertinent negatives include no abdominal pain, chest pain, chills, congestion, coughing, fever, nausea or vomiting.     Roby is coming for f/u of hypokalemia, hypophosphatemia,CKD III  Hospitalized couple of times over past several time with N/V-found low k and low phos level  Found elevated Phos level in 24 H urine collection  Mg well controlled  CKD III creat worse 1.5-1.7 -could be from volume depletion given elevated serum sodium level  Currently doing well, no complaints  HTN: BP well controlled  Elevated PTH -r/o parathyroid adenoma    Review of Systems   Constitutional: Negative for chills, fever, malaise/fatigue and weight loss.   HENT: Negative for congestion, hearing loss and sinus pain.    Eyes: Negative.    Respiratory: Negative for cough, hemoptysis, shortness of breath and wheezing.    Cardiovascular: Negative for chest pain, palpitations, orthopnea and leg swelling.   Gastrointestinal: Negative for abdominal pain, diarrhea, nausea and vomiting.   Genitourinary: Negative for dysuria, flank pain, frequency, hematuria and urgency.   Skin: Negative.    All other systems reviewed and are negative.    Past Medical History:   Diagnosis Date   • Dyslipidemia    • Hypertension    • Hypophosphatemia    • Psychiatric problem     ADHD/ depression   • Seizure disorder (HCC)        No family history on file.    Social History     Socioeconomic History   • Marital status:      Spouse name: Not on file   • Number of children: Not on file   • Years of education: Not on file   • Highest education level: Not on file   Occupational History   • Not on file   Tobacco Use   • Smoking status: Former Smoker     Types: Cigarettes     Quit date: 1990     Years since quittin.2   • Smokeless tobacco: Never Used   • Tobacco comment: current [facility] smoker    Substance and Sexual  Activity   • Alcohol use: Not Currently   • Drug use: Yes     Types: Marijuana, Inhaled     Comment: marijuana   • Sexual activity: Not on file   Other Topics Concern   • Not on file   Social History Narrative   • Not on file     Social Determinants of Health     Financial Resource Strain:    • Difficulty of Paying Living Expenses:    Food Insecurity: Unknown   • Worried About Running Out of Food in the Last Year: Patient refused   • Ran Out of Food in the Last Year: Patient refused   Transportation Needs: Unknown   • Lack of Transportation (Medical): Patient refused   • Lack of Transportation (Non-Medical): Patient refused   Physical Activity:    • Days of Exercise per Week:    • Minutes of Exercise per Session:    Stress:    • Feeling of Stress :    Social Connections:    • Frequency of Communication with Friends and Family:    • Frequency of Social Gatherings with Friends and Family:    • Attends Holiness Services:    • Active Member of Clubs or Organizations:    • Attends Club or Organization Meetings:    • Marital Status:    Intimate Partner Violence:    • Fear of Current or Ex-Partner:    • Emotionally Abused:    • Physically Abused:    • Sexually Abused:           Objective:     /84 (BP Location: Right arm, Patient Position: Sitting)   Pulse 66   Temp 36.6 °C (97.8 °F) (Temporal)   Ht 1.829 m (6')   Wt 90.1 kg (198 lb 9.6 oz)   SpO2 95%   BMI 26.94 kg/m²      Physical Exam  Vitals reviewed.   Constitutional:       General: He is not in acute distress.     Appearance: Normal appearance. He is well-developed. He is not diaphoretic.   HENT:      Head: Normocephalic and atraumatic.      Nose: Nose normal.      Mouth/Throat:      Mouth: Mucous membranes are moist.      Pharynx: Oropharynx is clear.   Eyes:      Extraocular Movements: Extraocular movements intact.      Conjunctiva/sclera: Conjunctivae normal.      Pupils: Pupils are equal, round, and reactive to light.   Cardiovascular:      Rate and  Rhythm: Normal rate and regular rhythm.      Pulses: Normal pulses.      Heart sounds: Normal heart sounds.   Pulmonary:      Effort: Pulmonary effort is normal. No respiratory distress.      Breath sounds: Normal breath sounds. No wheezing, rhonchi or rales.   Abdominal:      General: Bowel sounds are normal. There is no distension.      Palpations: Abdomen is soft. There is no mass.      Tenderness: There is no abdominal tenderness. There is no right CVA tenderness, left CVA tenderness or guarding.   Musculoskeletal:      Cervical back: Normal range of motion and neck supple.      Right lower leg: No edema.      Left lower leg: No edema.   Skin:     General: Skin is warm.      Coloration: Skin is not jaundiced.      Findings: No erythema or rash.   Neurological:      General: No focal deficit present.      Mental Status: He is alert and oriented to person, place, and time.      Cranial Nerves: No cranial nerve deficit.      Coordination: Coordination normal.   Psychiatric:         Mood and Affect: Mood normal.         Behavior: Behavior normal.         Thought Content: Thought content normal.         Judgment: Judgment normal.            Laboratory results reviewed: d/w Pt  Lab Results   Component Value Date/Time    CREATININE 1.73 (H) 03/25/2021 11:39 AM    POTASSIUM 4.1 03/25/2021 11:39 AM          Assessment/Plan:       1. Hypophosphatemia     Likely tubular defect - well controlled with KPhos -to monitor closely    2. Hypokalemia      Continue supplementation      3. CKD (chronic kidney disease), stage III      With worsening creat level -to monitor closely      Increase fluid intake  - Basic Metabolic Panel; Future    4.Vit D def -continue supplementation    5. Elevated PTH -to schedule parathyroid nuclear scan    Recs:  Keep well hydrated  Low salt diet  Monitor BP  F/u in 1-2 months

## 2021-04-09 ENCOUNTER — IMMUNIZATION (OUTPATIENT)
Dept: FAMILY PLANNING/WOMEN'S HEALTH CLINIC | Facility: IMMUNIZATION CENTER | Age: 66
End: 2021-04-09
Attending: INTERNAL MEDICINE
Payer: MEDICARE

## 2021-04-09 DIAGNOSIS — Z23 ENCOUNTER FOR VACCINATION: Primary | ICD-10-CM

## 2021-04-09 PROCEDURE — 91300 PFIZER SARS-COV-2 VACCINE: CPT | Performed by: INTERNAL MEDICINE

## 2021-04-09 PROCEDURE — 0002A PFIZER SARS-COV-2 VACCINE: CPT | Performed by: INTERNAL MEDICINE

## 2021-04-20 ENCOUNTER — HOSPITAL ENCOUNTER (OUTPATIENT)
Dept: RADIOLOGY | Facility: MEDICAL CENTER | Age: 66
End: 2021-04-20
Attending: INTERNAL MEDICINE
Payer: MEDICARE

## 2021-04-20 DIAGNOSIS — E21.3 HYPERPARATHYROIDISM (HCC): ICD-10-CM

## 2021-04-20 PROCEDURE — A9500 TC99M SESTAMIBI: HCPCS

## 2021-05-13 NOTE — OR SURGEON
Immediate Post OP Note    PreOp Diagnosis: Recurrent vomiting    PostOp Diagnosis: erosive esophagitis, small hiatal hernia    Procedure(s):  GASTROSCOPY with biopsies - Wound Class: None    Surgeon(s):  Chad Yen M.D.    Anesthesiologist/Type of Anesthesia:  Anesthesiologist: Adrian Nunez M.D./General anesthesia    Surgical Staff:  Circulator: Quoc Coronado R.N.  Endoscopy Technician: Kristy Maya    Specimens removed if any:  ID Type Source Tests Collected by Time Destination   A : Biopsy Other Other PATHOLOGY SPECIMEN Chad Yen M.D. 7/10/2020  1:28 PM    B : Biopsy Other Other PATHOLOGY SPECIMEN Chad Yen M.D. 7/10/2020  1:29 PM    C : Biopsy Other Other PATHOLOGY SPECIMEN Chad Yen M.D. 7/10/2020  1:29 PM        Estimated Blood Loss: None    Findings:   1. LA Grade D erosive esophagitis extending from GE junction up to 28 cm, likely from vomiting  2. Small hiatal hernia      Complications: None        7/10/2020 1:31 PM Chad Yen M.D.     Lab results mailed out to patient today. yes

## 2021-06-16 ENCOUNTER — APPOINTMENT (OUTPATIENT)
Dept: NEPHROLOGY | Facility: MEDICAL CENTER | Age: 66
End: 2021-06-16
Payer: MEDICARE

## 2022-10-19 ENCOUNTER — OFFICE VISIT (OUTPATIENT)
Dept: NEPHROLOGY | Facility: MEDICAL CENTER | Age: 67
End: 2022-10-19
Payer: MEDICARE

## 2022-10-19 VITALS
WEIGHT: 197 LBS | TEMPERATURE: 98.3 F | BODY MASS INDEX: 26.68 KG/M2 | SYSTOLIC BLOOD PRESSURE: 138 MMHG | HEART RATE: 61 BPM | HEIGHT: 72 IN | DIASTOLIC BLOOD PRESSURE: 80 MMHG | OXYGEN SATURATION: 98 %

## 2022-10-19 DIAGNOSIS — E83.39 HYPOPHOSPHATEMIA: ICD-10-CM

## 2022-10-19 DIAGNOSIS — E55.9 VITAMIN D DEFICIENCY: ICD-10-CM

## 2022-10-19 DIAGNOSIS — E21.1 HYPERPARATHYROIDISM DUE TO VITAMIN D DEFICIENCY (HCC): ICD-10-CM

## 2022-10-19 DIAGNOSIS — D64.9 ANEMIA, UNSPECIFIED TYPE: ICD-10-CM

## 2022-10-19 DIAGNOSIS — E87.6 HYPOKALEMIA: ICD-10-CM

## 2022-10-19 DIAGNOSIS — I10 ESSENTIAL HYPERTENSION: ICD-10-CM

## 2022-10-19 DIAGNOSIS — N18.4 CKD (CHRONIC KIDNEY DISEASE), STAGE IV (HCC): ICD-10-CM

## 2022-10-19 PROCEDURE — 99214 OFFICE O/P EST MOD 30 MIN: CPT | Performed by: INTERNAL MEDICINE

## 2022-10-19 RX ORDER — LOSARTAN POTASSIUM 50 MG/1
50 TABLET ORAL DAILY
Status: ON HOLD | COMMUNITY
Start: 2022-10-04 | End: 2023-12-15

## 2022-10-19 RX ORDER — CALCIUM POLYCARBOPHIL 625 MG
625 TABLET ORAL DAILY
Status: ON HOLD | COMMUNITY
End: 2023-01-01

## 2022-10-19 RX ORDER — AMLODIPINE BESYLATE 10 MG/1
10 TABLET ORAL DAILY
Status: ON HOLD | COMMUNITY
Start: 2022-06-29 | End: 2023-01-01

## 2022-10-19 ASSESSMENT — ENCOUNTER SYMPTOMS
SHORTNESS OF BREATH: 0
WHEEZING: 0
FLANK PAIN: 0
ABDOMINAL PAIN: 0
WEIGHT LOSS: 0
FEVER: 0
CHILLS: 0
COUGH: 0
ORTHOPNEA: 0
DIARRHEA: 1
PALPITATIONS: 0
VOMITING: 0
EYES NEGATIVE: 1
NAUSEA: 0
HEMOPTYSIS: 0
SINUS PAIN: 0

## 2022-10-19 NOTE — PROGRESS NOTES
Subjective:      Roby Viramontes II is a 66 y.o. male who presents with Chronic Kidney Disease            Chronic Kidney Disease  Pertinent negatives include no abdominal pain, chest pain, chills, congestion, coughing, fever, nausea or vomiting.   Roby is coming for f/u of hypokalemia, hypophosphatemia,CKD III, hypokalemia  Last time seen here in 2021 -not able to come for f/u due to insurance issues.  C/o chronic diarrhea, no N/V/abdominal pain  No dysuria/hematuria/flank pain  Found elevated Phos level in 24 H urine collection  Mg well controlled  Low K -stopped supplementation  CKD III with progression to stage IV -creat at 2.3-2.4  HTN: BP well controlled  Elevated PTH - nuclear scan negative for parathyroid adenoma    Review of Systems   Constitutional:  Negative for chills, fever, malaise/fatigue and weight loss.   HENT:  Negative for congestion, hearing loss and sinus pain.    Eyes: Negative.    Respiratory:  Negative for cough, hemoptysis, shortness of breath and wheezing.    Cardiovascular:  Negative for chest pain, palpitations, orthopnea and leg swelling.   Gastrointestinal:  Positive for diarrhea. Negative for abdominal pain, nausea and vomiting.   Genitourinary:  Negative for dysuria, flank pain, frequency, hematuria and urgency.   Skin: Negative.    All other systems reviewed and are negative.  Past Medical History:   Diagnosis Date    Dyslipidemia     Hypertension     Hypophosphatemia     Psychiatric problem     ADHD/ depression    Seizure disorder (HCC)        No family history on file.    Social History     Socioeconomic History    Marital status:      Spouse name: Not on file    Number of children: Not on file    Years of education: Not on file    Highest education level: Not on file   Occupational History    Not on file   Tobacco Use    Smoking status: Former Smoker     Types: Cigarettes     Quit date: 1990     Years since quittin.2    Smokeless tobacco: Never Used     Tobacco comment: current majaruna smoker    Substance and Sexual Activity    Alcohol use: Not Currently    Drug use: Yes     Types: Marijuana, Inhaled     Comment: marijuana    Sexual activity: Not on file   Other Topics Concern    Not on file   Social History Narrative    Not on file     Social Determinants of Health     Financial Resource Strain:     Difficulty of Paying Living Expenses:    Food Insecurity: Unknown    Worried About Running Out of Food in the Last Year: Patient refused    Ran Out of Food in the Last Year: Patient refused   Transportation Needs: Unknown    Lack of Transportation (Medical): Patient refused    Lack of Transportation (Non-Medical): Patient refused   Physical Activity:     Days of Exercise per Week:     Minutes of Exercise per Session:    Stress:     Feeling of Stress :    Social Connections:     Frequency of Communication with Friends and Family:     Frequency of Social Gatherings with Friends and Family:     Attends Jew Services:     Active Member of Clubs or Organizations:     Attends Club or Organization Meetings:     Marital Status:    Intimate Partner Violence:     Fear of Current or Ex-Partner:     Emotionally Abused:     Physically Abused:     Sexually Abused:           Objective:     /84 (BP Location: Right arm, Patient Position: Sitting)   Pulse 66   Temp 36.6 °C (97.8 °F) (Temporal)   Ht 1.829 m (6')   Wt 90.1 kg (198 lb 9.6 oz)   SpO2 95%   BMI 26.94 kg/m²      Physical Exam  Vitals reviewed.   Constitutional:       General: He is not in acute distress.     Appearance: Normal appearance. He is well-developed. He is not diaphoretic.   HENT:      Head: Normocephalic and atraumatic.      Nose: Nose normal.      Mouth/Throat:      Mouth: Mucous membranes are moist.      Pharynx: Oropharynx is clear.   Eyes:      Extraocular Movements: Extraocular movements intact.      Conjunctiva/sclera: Conjunctivae normal.      Pupils: Pupils are equal, round, and reactive  to light.   Cardiovascular:      Rate and Rhythm: Normal rate and regular rhythm.      Pulses: Normal pulses.      Heart sounds: Normal heart sounds.   Pulmonary:      Effort: Pulmonary effort is normal. No respiratory distress.      Breath sounds: Normal breath sounds. No wheezing or rales.   Abdominal:      General: Bowel sounds are normal. There is no distension.      Palpations: Abdomen is soft. There is no mass.      Tenderness: There is no abdominal tenderness. There is no right CVA tenderness or left CVA tenderness.   Musculoskeletal:      Cervical back: Normal range of motion and neck supple.      Right lower leg: No edema.      Left lower leg: No edema.   Skin:     General: Skin is warm.      Coloration: Skin is not pale.      Findings: No erythema or rash.   Neurological:      General: No focal deficit present.      Mental Status: He is alert and oriented to person, place, and time.      Cranial Nerves: No cranial nerve deficit.      Coordination: Coordination normal.   Psychiatric:         Mood and Affect: Mood normal.         Behavior: Behavior normal.         Thought Content: Thought content normal.         Judgment: Judgment normal.          Laboratory results reviewed: d/w Pt  Lab Results   Component Value Date/Time    CREATININE 1.73 (H) 03/25/2021 11:39 AM    POTASSIUM 4.1 03/25/2021 11:39 AM          Assessment/Plan:       1. Hypophosphatemia     Likely tubular defect - controlled with KPhos -to monitor closely    2. Hypokalemia      To restart KCl supplementation      3. CKD (chronic kidney disease), stage III -progression to stage IV      -to monitor closely      Increase fluid intake      Repeat renal panel in 2 weeks    4.Vit D def - to restart supplementation 5000 units daily    5. Elevated PTH - parathyroid nuclear scan negative for adenoma    Recs:  K phos 2 tablets twice a day  Restart KCl 20 mEq  twice a day  GI evaluation for diarrhea  Keep well hydrated  Vit D3 5000 units daily  Repeat  renal panel, Phos in 2 weeks and call clinic to discuss results  F/u in 2-3 months

## 2022-10-19 NOTE — PATIENT INSTRUCTIONS
K phos 2 tablets twice a day  Restart KCl 20 mEq  twice a day  GI evaluation for diarrhea  Keep well hydrated  Vit D3 5000 units daily

## 2023-01-01 ENCOUNTER — APPOINTMENT (OUTPATIENT)
Dept: RADIOLOGY | Facility: MEDICAL CENTER | Age: 68
DRG: 392 | End: 2023-01-01
Attending: EMERGENCY MEDICINE
Payer: MEDICARE

## 2023-01-01 ENCOUNTER — HOSPITAL ENCOUNTER (INPATIENT)
Facility: MEDICAL CENTER | Age: 68
LOS: 1 days | DRG: 392 | End: 2023-01-02
Attending: EMERGENCY MEDICINE | Admitting: HOSPITALIST
Payer: MEDICARE

## 2023-01-01 DIAGNOSIS — E83.39 HYPOPHOSPHATEMIA: ICD-10-CM

## 2023-01-01 DIAGNOSIS — R56.9 SEIZURE (HCC): ICD-10-CM

## 2023-01-01 DIAGNOSIS — R11.2 NAUSEA AND VOMITING, UNSPECIFIED VOMITING TYPE: ICD-10-CM

## 2023-01-01 DIAGNOSIS — E86.0 DEHYDRATION: ICD-10-CM

## 2023-01-01 PROBLEM — I25.10 CORONARY ARTERY CALCIFICATION SEEN ON CAT SCAN: Status: ACTIVE | Noted: 2020-11-05

## 2023-01-01 PROBLEM — K21.9 CHRONIC GASTROESOPHAGEAL REFLUX DISEASE: Status: ACTIVE | Noted: 2021-08-04

## 2023-01-01 PROBLEM — I16.0 HYPERTENSIVE URGENCY: Status: ACTIVE | Noted: 2023-01-01

## 2023-01-01 PROBLEM — N18.4 CKD (CHRONIC KIDNEY DISEASE) STAGE 4, GFR 15-29 ML/MIN (HCC): Status: ACTIVE | Noted: 2021-06-22

## 2023-01-01 PROBLEM — I16.0 HYPERTENSIVE URGENCY: Status: RESOLVED | Noted: 2023-01-01 | Resolved: 2023-01-01

## 2023-01-01 PROBLEM — E87.20 METABOLIC ACIDOSIS: Status: ACTIVE | Noted: 2023-01-01

## 2023-01-01 LAB
ALBUMIN SERPL BCP-MCNC: 4.7 G/DL (ref 3.2–4.9)
ALBUMIN/GLOB SERPL: 1.6 G/DL
ALP SERPL-CCNC: 190 U/L (ref 30–99)
ALT SERPL-CCNC: 10 U/L (ref 2–50)
ANION GAP SERPL CALC-SCNC: 22 MMOL/L (ref 7–16)
AST SERPL-CCNC: 14 U/L (ref 12–45)
BASOPHILS # BLD AUTO: 0.3 % (ref 0–1.8)
BASOPHILS # BLD: 0.02 K/UL (ref 0–0.12)
BILIRUB SERPL-MCNC: 0.4 MG/DL (ref 0.1–1.5)
BUN SERPL-MCNC: 17 MG/DL (ref 8–22)
CALCIUM ALBUM COR SERPL-MCNC: 7.5 MG/DL (ref 8.5–10.5)
CALCIUM SERPL-MCNC: 8.1 MG/DL (ref 8.5–10.5)
CARBAMAZEPINE SERPL-MCNC: 4 UG/ML (ref 4–12)
CHLORIDE SERPL-SCNC: 102 MMOL/L (ref 96–112)
CO2 SERPL-SCNC: 15 MMOL/L (ref 20–33)
CREAT SERPL-MCNC: 2.59 MG/DL (ref 0.5–1.4)
EKG IMPRESSION: NORMAL
EOSINOPHIL # BLD AUTO: 0 K/UL (ref 0–0.51)
EOSINOPHIL NFR BLD: 0 % (ref 0–6.9)
ERYTHROCYTE [DISTWIDTH] IN BLOOD BY AUTOMATED COUNT: 44.7 FL (ref 35.9–50)
GFR SERPLBLD CREATININE-BSD FMLA CKD-EPI: 26 ML/MIN/1.73 M 2
GLOBULIN SER CALC-MCNC: 2.9 G/DL (ref 1.9–3.5)
GLUCOSE SERPL-MCNC: 256 MG/DL (ref 65–99)
HCT VFR BLD AUTO: 31.9 % (ref 42–52)
HGB BLD-MCNC: 10.8 G/DL (ref 14–18)
IMM GRANULOCYTES # BLD AUTO: 0.07 K/UL (ref 0–0.11)
IMM GRANULOCYTES NFR BLD AUTO: 0.9 % (ref 0–0.9)
LIPASE SERPL-CCNC: 62 U/L (ref 11–82)
LYMPHOCYTES # BLD AUTO: 0.43 K/UL (ref 1–4.8)
LYMPHOCYTES NFR BLD: 5.7 % (ref 22–41)
MAGNESIUM SERPL-MCNC: 1.5 MG/DL (ref 1.5–2.5)
MCH RBC QN AUTO: 32 PG (ref 27–33)
MCHC RBC AUTO-ENTMCNC: 33.9 G/DL (ref 33.7–35.3)
MCV RBC AUTO: 94.4 FL (ref 81.4–97.8)
MONOCYTES # BLD AUTO: 0.14 K/UL (ref 0–0.85)
MONOCYTES NFR BLD AUTO: 1.8 % (ref 0–13.4)
NEUTROPHILS # BLD AUTO: 6.92 K/UL (ref 1.82–7.42)
NEUTROPHILS NFR BLD: 91.3 % (ref 44–72)
NRBC # BLD AUTO: 0 K/UL
NRBC BLD-RTO: 0 /100 WBC
PHOSPHATE SERPL-MCNC: 3.7 MG/DL (ref 2.5–4.5)
PLATELET # BLD AUTO: 129 K/UL (ref 164–446)
PMV BLD AUTO: 10.2 FL (ref 9–12.9)
POTASSIUM SERPL-SCNC: 4.1 MMOL/L (ref 3.6–5.5)
PROT SERPL-MCNC: 7.6 G/DL (ref 6–8.2)
RBC # BLD AUTO: 3.38 M/UL (ref 4.7–6.1)
SODIUM SERPL-SCNC: 139 MMOL/L (ref 135–145)
WBC # BLD AUTO: 7.6 K/UL (ref 4.8–10.8)

## 2023-01-01 PROCEDURE — 99222 1ST HOSP IP/OBS MODERATE 55: CPT | Mod: AI,GC | Performed by: HOSPITALIST

## 2023-01-01 PROCEDURE — 36415 COLL VENOUS BLD VENIPUNCTURE: CPT

## 2023-01-01 PROCEDURE — 96365 THER/PROPH/DIAG IV INF INIT: CPT

## 2023-01-01 PROCEDURE — 83690 ASSAY OF LIPASE: CPT

## 2023-01-01 PROCEDURE — 700111 HCHG RX REV CODE 636 W/ 250 OVERRIDE (IP)

## 2023-01-01 PROCEDURE — 96366 THER/PROPH/DIAG IV INF ADDON: CPT

## 2023-01-01 PROCEDURE — 700102 HCHG RX REV CODE 250 W/ 637 OVERRIDE(OP)

## 2023-01-01 PROCEDURE — 700105 HCHG RX REV CODE 258: Performed by: EMERGENCY MEDICINE

## 2023-01-01 PROCEDURE — A9270 NON-COVERED ITEM OR SERVICE: HCPCS

## 2023-01-01 PROCEDURE — G0378 HOSPITAL OBSERVATION PER HR: HCPCS

## 2023-01-01 PROCEDURE — 96375 TX/PRO/DX INJ NEW DRUG ADDON: CPT

## 2023-01-01 PROCEDURE — 700111 HCHG RX REV CODE 636 W/ 250 OVERRIDE (IP): Performed by: EMERGENCY MEDICINE

## 2023-01-01 PROCEDURE — 70450 CT HEAD/BRAIN W/O DYE: CPT

## 2023-01-01 PROCEDURE — 80053 COMPREHEN METABOLIC PANEL: CPT

## 2023-01-01 PROCEDURE — 93010 ELECTROCARDIOGRAM REPORT: CPT | Performed by: INTERNAL MEDICINE

## 2023-01-01 PROCEDURE — 84100 ASSAY OF PHOSPHORUS: CPT

## 2023-01-01 PROCEDURE — 96372 THER/PROPH/DIAG INJ SC/IM: CPT

## 2023-01-01 PROCEDURE — 85025 COMPLETE CBC W/AUTO DIFF WBC: CPT

## 2023-01-01 PROCEDURE — 700105 HCHG RX REV CODE 258

## 2023-01-01 PROCEDURE — 96374 THER/PROPH/DIAG INJ IV PUSH: CPT

## 2023-01-01 PROCEDURE — 99285 EMERGENCY DEPT VISIT HI MDM: CPT

## 2023-01-01 PROCEDURE — 93005 ELECTROCARDIOGRAM TRACING: CPT

## 2023-01-01 PROCEDURE — 83735 ASSAY OF MAGNESIUM: CPT

## 2023-01-01 PROCEDURE — 80156 ASSAY CARBAMAZEPINE TOTAL: CPT

## 2023-01-01 RX ORDER — ATORVASTATIN CALCIUM 40 MG/1
40 TABLET, FILM COATED ORAL NIGHTLY
Status: DISCONTINUED | OUTPATIENT
Start: 2023-01-01 | End: 2023-01-02 | Stop reason: HOSPADM

## 2023-01-01 RX ORDER — HYDRALAZINE HYDROCHLORIDE 20 MG/ML
10 INJECTION INTRAMUSCULAR; INTRAVENOUS ONCE
Status: COMPLETED | OUTPATIENT
Start: 2023-01-01 | End: 2023-01-01

## 2023-01-01 RX ORDER — SODIUM CHLORIDE 9 MG/ML
1000 INJECTION, SOLUTION INTRAVENOUS ONCE
Status: COMPLETED | OUTPATIENT
Start: 2023-01-01 | End: 2023-01-01

## 2023-01-01 RX ORDER — ONDANSETRON 4 MG/1
4 TABLET, FILM COATED ORAL EVERY 4 HOURS PRN
Status: DISCONTINUED | OUTPATIENT
Start: 2023-01-01 | End: 2023-01-01

## 2023-01-01 RX ORDER — CARBAMAZEPINE 200 MG/1
200 TABLET ORAL 2 TIMES DAILY
Status: DISCONTINUED | OUTPATIENT
Start: 2023-01-01 | End: 2023-01-02 | Stop reason: HOSPADM

## 2023-01-01 RX ORDER — ONDANSETRON 2 MG/ML
4 INJECTION INTRAMUSCULAR; INTRAVENOUS EVERY 6 HOURS PRN
Status: DISCONTINUED | OUTPATIENT
Start: 2023-01-01 | End: 2023-01-02 | Stop reason: HOSPADM

## 2023-01-01 RX ORDER — HEPARIN SODIUM 5000 [USP'U]/ML
5000 INJECTION, SOLUTION INTRAVENOUS; SUBCUTANEOUS EVERY 8 HOURS
Status: DISCONTINUED | OUTPATIENT
Start: 2023-01-01 | End: 2023-01-02 | Stop reason: HOSPADM

## 2023-01-01 RX ORDER — OMEPRAZOLE 20 MG/1
20 CAPSULE, DELAYED RELEASE ORAL DAILY
Status: DISCONTINUED | OUTPATIENT
Start: 2023-01-01 | End: 2023-01-02 | Stop reason: HOSPADM

## 2023-01-01 RX ORDER — EPLERENONE 50 MG/1
50 TABLET, FILM COATED ORAL DAILY
Status: SHIPPED | COMMUNITY
End: 2023-12-13

## 2023-01-01 RX ORDER — PROCHLORPERAZINE EDISYLATE 5 MG/ML
10 INJECTION INTRAMUSCULAR; INTRAVENOUS EVERY 6 HOURS PRN
Status: DISCONTINUED | OUTPATIENT
Start: 2023-01-01 | End: 2023-01-02 | Stop reason: HOSPADM

## 2023-01-01 RX ORDER — AMLODIPINE BESYLATE 10 MG/1
10 TABLET ORAL DAILY
COMMUNITY

## 2023-01-01 RX ORDER — AMOXICILLIN 250 MG
2 CAPSULE ORAL 2 TIMES DAILY
Status: DISCONTINUED | OUTPATIENT
Start: 2023-01-01 | End: 2023-01-02 | Stop reason: HOSPADM

## 2023-01-01 RX ORDER — POLYETHYLENE GLYCOL 3350 17 G/17G
1 POWDER, FOR SOLUTION ORAL
Status: DISCONTINUED | OUTPATIENT
Start: 2023-01-01 | End: 2023-01-02 | Stop reason: HOSPADM

## 2023-01-01 RX ORDER — AMLODIPINE BESYLATE 5 MG/1
10 TABLET ORAL DAILY
Status: DISCONTINUED | OUTPATIENT
Start: 2023-01-01 | End: 2023-01-02 | Stop reason: HOSPADM

## 2023-01-01 RX ORDER — QUETIAPINE FUMARATE 25 MG/1
25 TABLET, FILM COATED ORAL EVERY EVENING
Status: DISCONTINUED | OUTPATIENT
Start: 2023-01-01 | End: 2023-01-02 | Stop reason: HOSPADM

## 2023-01-01 RX ORDER — BISACODYL 10 MG
10 SUPPOSITORY, RECTAL RECTAL
Status: DISCONTINUED | OUTPATIENT
Start: 2023-01-01 | End: 2023-01-02 | Stop reason: HOSPADM

## 2023-01-01 RX ORDER — LOSARTAN POTASSIUM 50 MG/1
50 TABLET ORAL DAILY
Status: DISCONTINUED | OUTPATIENT
Start: 2023-01-01 | End: 2023-01-01

## 2023-01-01 RX ORDER — METOCLOPRAMIDE HYDROCHLORIDE 5 MG/ML
10 INJECTION INTRAMUSCULAR; INTRAVENOUS ONCE
Status: COMPLETED | OUTPATIENT
Start: 2023-01-01 | End: 2023-01-01

## 2023-01-01 RX ORDER — CARBAMAZEPINE 200 MG/1
200 TABLET ORAL 2 TIMES DAILY
COMMUNITY

## 2023-01-01 RX ORDER — SODIUM CHLORIDE, SODIUM LACTATE, POTASSIUM CHLORIDE, AND CALCIUM CHLORIDE .6; .31; .03; .02 G/100ML; G/100ML; G/100ML; G/100ML
500 INJECTION, SOLUTION INTRAVENOUS ONCE
Status: ACTIVE | OUTPATIENT
Start: 2023-01-01 | End: 2023-01-02

## 2023-01-01 RX ORDER — ESCITALOPRAM OXALATE 10 MG/1
10 TABLET ORAL DAILY
COMMUNITY

## 2023-01-01 RX ORDER — QUETIAPINE FUMARATE 25 MG/1
25 TABLET, FILM COATED ORAL NIGHTLY
COMMUNITY

## 2023-01-01 RX ORDER — ESCITALOPRAM OXALATE 10 MG/1
20 TABLET ORAL EVERY EVENING
Status: DISCONTINUED | OUTPATIENT
Start: 2023-01-01 | End: 2023-01-02 | Stop reason: HOSPADM

## 2023-01-01 RX ORDER — OMEPRAZOLE 40 MG/1
40 CAPSULE, DELAYED RELEASE ORAL DAILY
COMMUNITY

## 2023-01-01 RX ADMIN — MAGNESIUM SULFATE HEPTAHYDRATE 3 G: 500 INJECTION, SOLUTION INTRAMUSCULAR; INTRAVENOUS at 16:45

## 2023-01-01 RX ADMIN — HYDRALAZINE HYDROCHLORIDE 10 MG: 20 INJECTION INTRAMUSCULAR; INTRAVENOUS at 15:22

## 2023-01-01 RX ADMIN — METOCLOPRAMIDE 10 MG: 5 INJECTION, SOLUTION INTRAMUSCULAR; INTRAVENOUS at 12:11

## 2023-01-01 RX ADMIN — HEPARIN SODIUM 5000 UNITS: 5000 INJECTION, SOLUTION INTRAVENOUS; SUBCUTANEOUS at 16:05

## 2023-01-01 RX ADMIN — ESCITALOPRAM OXALATE 20 MG: 10 TABLET ORAL at 18:21

## 2023-01-01 RX ADMIN — SODIUM CHLORIDE 1000 ML: 9 INJECTION, SOLUTION INTRAVENOUS at 10:58

## 2023-01-01 RX ADMIN — AMLODIPINE BESYLATE 10 MG: 5 TABLET ORAL at 16:05

## 2023-01-01 RX ADMIN — QUETIAPINE FUMARATE 25 MG: 25 TABLET ORAL at 18:22

## 2023-01-01 RX ADMIN — CARBAMAZEPINE 200 MG: 200 TABLET ORAL at 18:22

## 2023-01-01 RX ADMIN — OMEPRAZOLE 20 MG: 20 CAPSULE, DELAYED RELEASE ORAL at 18:20

## 2023-01-01 RX ADMIN — DIBASIC SODIUM PHOSPHATE, MONOBASIC POTASSIUM PHOSPHATE AND MONOBASIC SODIUM PHOSPHATE 500 MG: 852; 155; 130 TABLET ORAL at 18:23

## 2023-01-01 ASSESSMENT — ENCOUNTER SYMPTOMS
GASTROINTESTINAL NEGATIVE: 1
CARDIOVASCULAR NEGATIVE: 1
RESPIRATORY NEGATIVE: 1
CONSTITUTIONAL NEGATIVE: 1
EYES NEGATIVE: 1
PSYCHIATRIC NEGATIVE: 1
MUSCULOSKELETAL NEGATIVE: 1
NEUROLOGICAL NEGATIVE: 1

## 2023-01-01 ASSESSMENT — LIFESTYLE VARIABLES
ALCOHOL_USE: NO
HAVE PEOPLE ANNOYED YOU BY CRITICIZING YOUR DRINKING: NO
TOTAL SCORE: 0
TOTAL SCORE: 0
HAVE YOU EVER FELT YOU SHOULD CUT DOWN ON YOUR DRINKING: NO
EVER HAD A DRINK FIRST THING IN THE MORNING TO STEADY YOUR NERVES TO GET RID OF A HANGOVER: NO
DOES PATIENT WANT TO STOP DRINKING: NO
TOTAL SCORE: 0
DOES PATIENT WANT TO STOP DRINKING: CANNOT ASSESS
DO YOU DRINK ALCOHOL: NO
CONSUMPTION TOTAL: INCOMPLETE
EVER FELT BAD OR GUILTY ABOUT YOUR DRINKING: NO

## 2023-01-01 ASSESSMENT — PATIENT HEALTH QUESTIONNAIRE - PHQ9
1. LITTLE INTEREST OR PLEASURE IN DOING THINGS: NOT AT ALL
2. FEELING DOWN, DEPRESSED, IRRITABLE, OR HOPELESS: NOT AT ALL
SUM OF ALL RESPONSES TO PHQ9 QUESTIONS 1 AND 2: 0

## 2023-01-01 ASSESSMENT — FIBROSIS 4 INDEX
FIB4 SCORE: 2.37
FIB4 SCORE: 2.37

## 2023-01-01 NOTE — ASSESSMENT & PLAN NOTE
Patient has history focal epilepsy, S/p left temporal lobectomy in 2016 and on therapy with carbamazepine. Presented after 15 min of seizure-like activity witnessed by wife, received versed at admission. Triggered by multiple factors: dehydration and acidosis from cyclic N/V, missing/nonabsorption of chronic medication, also cannabis use. CT head with no acute events. Stable, recovered from post-ictal, no focal deficits. Patient is A and Ox4.   - Restart carbamazepine  - Carbamazepine levels  - Seizure precautions  - Telemetry

## 2023-01-01 NOTE — ASSESSMENT & PLAN NOTE
"\"Behaviors noted in childhood, was medicated with amphetamine as an adult, stopped recently on account of seizures\" per chart review. Along with depressive symptoms in remision.   - Continue home quetiapine and escitalopram   "

## 2023-01-01 NOTE — ED TRIAGE NOTES
BIB EMS from home with   Chief Complaint   Patient presents with    Diarrhea    Seizure   Hx of Seizures. Per EMS report, pt has not had a seizure x 1 year. Unknown if diarrhea occurred before or after seizure. Arrives in restraints. Alert and oriented x2, person and place. Refusing to answer most questions.     Received a total of 4 mg IN Versed.     ERP at bedside.

## 2023-01-01 NOTE — PROGRESS NOTES
Med rec complete per patient home pharmacy. Wife is coming in the nect hour with med list so will reconcile against that as well.    No abx in the last 30 days.    Patient has NKDA.    [facility].

## 2023-01-01 NOTE — ASSESSMENT & PLAN NOTE
Uncontrolled, now with SBP >180, DBP 80-90, however he has history of similar values at home. SHANA and seizure activity are most likely secondary to cannabis use and dehydration as above, no suspicion for these as end-organ damage. Patient is now asymptomatic.   - Restarted amlodipine   - Holding ACEi (SHANA)   - Single dose of hydralazine   - EKG  - Telemetry  - Monitor

## 2023-01-01 NOTE — ED NOTES
Bedside report to Doc ARMENDARIZ. All questions addressed. Pt transported with belonging bag with shirt and watch.

## 2023-01-01 NOTE — ED PROVIDER NOTES
ED Provider Note        CHIEF COMPLAINT  Chief Complaint   Patient presents with    Diarrhea    Seizure           HPI  Roby Viramontes II is a 67 y.o. male who presents for evaluation of seizure.  Per EMS patient was reported to have a grand mal seizure earlier today at home. He has a history of seizure disorder on Tegretol.  Blood sugar in route to the hospital was in the 200s.  EMS gave 4 mg of Versed and had to restrain him as he was being combative.  He was noted to be incontinent and postictal upon for EMS.  Patient is slightly sleepy but is able to provide some history.  He denies any pain at this time.  He admits to history of seizures, does not recall when his last seizure was.  He admits to some diarrhea over the last day.    I spoke with patient's wife Apple on the phone who reports that patient does have a history of seizure disorder, last seizure was a few months ago.  He has been compliant with his Tegretol.  Patient has a history of cyclic vomiting and she states that when he starts to have cyclic vomiting episodes he tends to have seizures due to electrolyte derangements.  She reports that over the last 24 hours he has had nausea, vomiting and diarrhea consistent with prior episodes of cyclic vomiting.  He is still using marijuana, which is the etiology of his cyclic vomiting.  She states that this morning he had a grand mal seizure, uncertain of how long it lasted.  She called EMS and he was brought here for further evaluation.  She states that he sees neurology in [facility].    EXTERNAL RECORDS REVIEWED  Per record review patient is primarily followed at [facility].  Per record review he has a history of hypertension, chronic kidney disease (stage IV, GFR 15-29), hypophosphatemia, hypokalemia, THC induced cyclic vomiting and seizure disorder.  He is followed by Dr. Boogie [facility] for his chronic kidney disease.    LIMITATION TO HISTORY   Patient is  post-ictal, history slightly limited    OUTSIDE HISTORIAN(S):  EMS and patient's wife Apple ([phone])        REVIEW OF SYSTEMS  See HPI for further details. All other systems are negative.     PAST MEDICAL HISTORY   has a past medical history of CKD (chronic kidney disease) stage 4, GFR 15-29 ml/min ([facility]) (2021), Dyslipidemia, Hypertension, Hypophosphatemia, Psychiatric problem, and Seizure disorder ([facility]).    SURGICAL HISTORY   has a past surgical history that includes gastroscopy-endo (7/10/2020).    FAMILY HISTORY  No family history on file.    SOCIAL HISTORY  Social History     Tobacco Use    Smoking status: Former     Types: Cigarettes     Quit date: 1990     Years since quittin.0    Smokeless tobacco: Never    Tobacco comments:     current majaruna smoker    Vaping Use    Vaping Use: Never used   Substance and Sexual Activity    Alcohol use: Not Currently    Drug use: Yes     Types: Marijuana, Inhaled     Comment: marijuana    Sexual activity: Not on file       CURRENT MEDICATIONS  Home Medications       Reviewed by Tita Barbour R.N. (Registered Nurse) on 23 at 0905  Med List Status: Unable to Obtain     Medication Last Dose Status   amLODIPine (NORVASC) 10 MG Tab  Active   atorvastatin (LIPITOR) 40 MG Tab  Active   Calcium Polycarbophil (FIBER) 625 MG Tab  Active   carBAMazepine (TEGRETOL) 200 MG Tab  Active   escitalopram (LEXAPRO) 20 MG tablet  Active   losartan (COZAAR) 50 MG Tab  Active   omeprazole (PRILOSEC) 20 MG delayed-release capsule  Active   ondansetron (ZOFRAN) 4 MG Tab tablet  Active   phosphorus (K-PHOS-NEUTRAL) 155-852-130 MG tablet  Active   potassium chloride SA (KDUR) 20 MEQ Tab CR  Active   QUEtiapine (SEROQUEL) 25 MG Tab  Active                    ALLERGIES  No Known Allergies    PHYSICAL EXAM  VITAL SIGNS: BP (!) 168/102   Pulse 73   Temp 36.9 °C (98.4 °F) (Temporal)   Resp 18   Ht 1.829 m (6')   Wt 85.3 kg (188 lb 0.8 oz)   SpO2 94%   BMI 25.50 kg/m²     Nursing note and vitals reviewed.  Constitutional: Well-developed and well-nourished. Mild distress.   HENT: Head is normocephalic and atraumatic.  Eyes: extra-ocular movements intact  Cardiovascular: regular rate and  regular rhythm. No murmur heard.  Pulmonary/Chest: Breath sounds normal. No wheezes or rales.   Abdominal: Soft and non-tender. No distention.    Musculoskeletal: Extremities exhibit normal range of motion without edema or tenderness.   Neurological: Awake and alert, CNs 2-12 intact, no focal neurologic deficits.  Patient is alert to self only, slightly confused appears postictal  Skin: Skin is warm and dry. No rash.         DIAGNOSTIC STUDIES / PROCEDURES    LABS  Labs Reviewed   CBC WITH DIFFERENTIAL - Abnormal; Notable for the following components:       Result Value    RBC 3.38 (*)     Hemoglobin 10.8 (*)     Hematocrit 31.9 (*)     Platelet Count 129 (*)     Neutrophils-Polys 91.30 (*)     Lymphocytes 5.70 (*)     Lymphs (Absolute) 0.43 (*)     All other components within normal limits   COMP METABOLIC PANEL - Abnormal; Notable for the following components:    Co2 15 (*)     Anion Gap 22.0 (*)     Glucose 256 (*)     Creatinine 2.59 (*)     Calcium 8.1 (*)     Alkaline Phosphatase 190 (*)     All other components within normal limits   CORRECTED CALCIUM - Abnormal; Notable for the following components:    Correct Calcium 7.5 (*)     All other components within normal limits   ESTIMATED GFR - Abnormal; Notable for the following components:    GFR (CKD-EPI) 26 (*)     All other components within normal limits   LIPASE   MAGNESIUM   PHOSPHORUS   CARBAMAZEPINE         RADIOLOGY  CT-HEAD W/O   Final Result      1.  Stable postoperative changes left temporal lobectomy.   2.  No acute intracranial abnormality.               COURSE & MEDICAL DECISION MAKING  Pertinent Labs & Imaging studies reviewed. (See chart for details)    ED Observation Status? No; Patient does not meet criteria for ED  Observation.     INITIAL ASSESSMENT AND PLAN    HYDRATION: Based on the patient's presentation of Acute Vomiting and Dehydration the patient was given IV fluids. IV Hydration was used because oral hydration was not adequate alone. Upon recheck following hydration, the patient was improved.    Escalation of care considered, and ultimately not performed: see below.     Barriers to care at this time, including but not limited to: none.     Diagnostic tests and prescription drugs considered including, but not limited to: see below.    FINAL PROBLEM LIST AND DISPOSITION    In addition to the chief complaint, the following problems were addressed: none    I have discussed management of the patient with the following physicians and ROSA's:  [facility] Internal Medicine    Discussion of management with other [facility] or appropriate source(s): [facility] Medicine    DISCUSSION  Patient is a 67-year-old male who presents for evaluation of seizure activity with associated nausea and vomiting.  Differential diagnosis includes cyclic vomiting, electrolyte derangement, dehydration, breakthrough seizure, medication noncompliance.  Diagnostic work-up includes labs.  Also unclear if patient hit his head or not as he is unable to provide history.  Wife is not certain if patient hit his head.  Therefore will obtain CT head to assess for traumatic injury.    Patient's initial vitals are within normal limits.  He is treated with IV fluids for dehydration secondary to nausea, vomiting and diarrhea.  Labs returned and are notable for slightly worsening renal function from baseline with elevated anion gap and low bicarb.  Labs are otherwise unremarkable.  CT of the head demonstrates no acute abnormalities.  Upon reassessment patient is feeling improved has returned to his baseline.  He is tolerating oral intake.  I discussed that clinically he appears dehydrated along with labs that are consistent with dehydration.  As he is tolerating oral intake  and was treated IV fluids patient would like to be discharged at this time..  I spoke with his wife who will come to pick him up.    When wife came to  patient he started to have vomiting again.  Was unable to tolerate oral intake.  As labs are consistent with dehydration and he is not tolerating oral intake I recommended hospitalization.  Patient and wife are amenable to this plan.  He is treated with Reglan for his nausea and vomiting.  I discussed the case with [facility] internal medicine team is excepted patient for hospitalization.  Patient is in guarded condition.      FINAL IMPRESSION  1. Seizure (HCC)    2. Dehydration    3. Nausea and vomiting, unspecified vomiting type           Electronically signed by: Natacha Delgado M.D., 1/1/2023 9:19 AM

## 2023-01-01 NOTE — ASSESSMENT & PLAN NOTE
Chronic since 2019, no history of liver disorder. Has history of hyperparathyroidism (negative for adenoma in images), possible bone-mineral disease, however, no specific bone lesions. DEXA SCAN in 2019 with osteopenia.    - Follow as outpt

## 2023-01-01 NOTE — ASSESSMENT & PLAN NOTE
Baseline Cr is around 2.37. Now 2.5 with increase in baseline BUN. Likely prerenal. Associated metabolic acidosis.   - IVF   - Monitor

## 2023-01-01 NOTE — ED NOTES
Medicated for N/V. Spouse is at bedside. ERP at bedside to discuss POC. Plan is for pt to be admitted to hospital.

## 2023-01-01 NOTE — SENIOR ADMIT NOTE
Senior Admit Note    67 year-old with history of cannabis hyperemesis syndrome, TBI, epilespy s/p left hippocampal resection 2016, who presents with episode of seizure this morning. He is an active marijuana user via pipe and has diagnosis of cannabis hyperemesis syndrome. He is alert/awake but memory is impaired likely post-ictal so history is collaterally from his wife. He reports that he has had poor PO intake and vomiting/diarrhea since last night. His wife notes that he had about a 10-15 min seizure episode while in bed this morning prompting bringing him tot come to the ED. He was post-ictal but has improved. He reports compliance with his carbamezapine.    In ED - vitals remarkable for SBP 180s, labs w/ AGMA with bicap 15, gap 22, hgb 10.8, magnesium 1.5, creatinine 2.59  He appears comfortable and is Aox3 answering questions. Neurologic exam unremarkable, CN intact, moves all 4 extremities with good strength and dexterity. Tongue and mucous membranes very dry.    Assessment and Plan    Breakthrough seizure  Normally stable on carbamezapine. Patient reports last breakthrough was months ago and also due to vomiting. Likely due to electrolyte imbalance and acidosis.   -IV antiemetics  -Replete magnesium  -IVF  -Counseled on cessation of marijuana, patient plans to stop after discharge  -Continue carbamezapine and check level    Acute kidney injury  Per nephro notes baseline cr is 2.2-2.3. Presently 2.59. Likely mild prerenal injury due to vomiting.  -Gentle IVF  -Avoid nephrotoxins  -Check bmp in AM    Hypertensive urgency  Continue home amlodipine and losartan  PRN IV antihypertensives for SBP >180    For additional details please see H&P by Dr. Harden

## 2023-01-01 NOTE — ASSESSMENT & PLAN NOTE
Patient resumed consumption and is having intractable N/V since last night, not tolerating PO. Contributed to seizure activity. Secondary dehydration, SHANA and acidosis. Also mild abdominal pain associated, no diarrhea or suspected ongoing infection.   - Lipase levels  - Check for QTc   - PRN antiemetics  - IVF   - Advance diet as tolerated   - Continued counseling regarding total cessation of use

## 2023-01-02 VITALS
OXYGEN SATURATION: 94 % | DIASTOLIC BLOOD PRESSURE: 71 MMHG | WEIGHT: 188.05 LBS | BODY MASS INDEX: 25.47 KG/M2 | HEART RATE: 83 BPM | RESPIRATION RATE: 16 BRPM | SYSTOLIC BLOOD PRESSURE: 145 MMHG | HEIGHT: 72 IN | TEMPERATURE: 98.9 F

## 2023-01-02 PROBLEM — E83.42 HYPOMAGNESEMIA: Status: RESOLVED | Noted: 2019-11-15 | Resolved: 2023-01-02

## 2023-01-02 PROBLEM — N18.4 CKD (CHRONIC KIDNEY DISEASE) STAGE 4, GFR 15-29 ML/MIN (HCC): Status: RESOLVED | Noted: 2021-06-22 | Resolved: 2023-01-02

## 2023-01-02 LAB
ALBUMIN SERPL BCP-MCNC: 4.1 G/DL (ref 3.2–4.9)
ALBUMIN/GLOB SERPL: 1.6 G/DL
ALP SERPL-CCNC: 148 U/L (ref 30–99)
ALT SERPL-CCNC: 8 U/L (ref 2–50)
ANION GAP SERPL CALC-SCNC: 14 MMOL/L (ref 7–16)
AST SERPL-CCNC: 17 U/L (ref 12–45)
BILIRUB SERPL-MCNC: 0.5 MG/DL (ref 0.1–1.5)
BUN SERPL-MCNC: 17 MG/DL (ref 8–22)
CALCIUM ALBUM COR SERPL-MCNC: 8 MG/DL (ref 8.5–10.5)
CALCIUM SERPL-MCNC: 8.1 MG/DL (ref 8.5–10.5)
CHLORIDE SERPL-SCNC: 103 MMOL/L (ref 96–112)
CO2 SERPL-SCNC: 21 MMOL/L (ref 20–33)
CREAT SERPL-MCNC: 2.21 MG/DL (ref 0.5–1.4)
EKG IMPRESSION: NORMAL
EST. AVERAGE GLUCOSE BLD GHB EST-MCNC: 103 MG/DL
FERRITIN SERPL-MCNC: 83.1 NG/ML (ref 22–322)
GFR SERPLBLD CREATININE-BSD FMLA CKD-EPI: 31 ML/MIN/1.73 M 2
GLOBULIN SER CALC-MCNC: 2.6 G/DL (ref 1.9–3.5)
GLUCOSE SERPL-MCNC: 92 MG/DL (ref 65–99)
HBA1C MFR BLD: 5.2 % (ref 4–5.6)
IRON SATN MFR SERPL: 45 % (ref 15–55)
IRON SERPL-MCNC: 95 UG/DL (ref 50–180)
LIPASE SERPL-CCNC: 41 U/L (ref 11–82)
MAGNESIUM SERPL-MCNC: 2.1 MG/DL (ref 1.5–2.5)
POTASSIUM SERPL-SCNC: 2.7 MMOL/L (ref 3.6–5.5)
POTASSIUM SERPL-SCNC: 3 MMOL/L (ref 3.6–5.5)
POTASSIUM SERPL-SCNC: 3.8 MMOL/L (ref 3.6–5.5)
PROT SERPL-MCNC: 6.7 G/DL (ref 6–8.2)
SODIUM SERPL-SCNC: 138 MMOL/L (ref 135–145)
TIBC SERPL-MCNC: 210 UG/DL (ref 250–450)
TROPONIN T SERPL-MCNC: 110 NG/L (ref 6–19)
TROPONIN T SERPL-MCNC: 88 NG/L (ref 6–19)
UIBC SERPL-MCNC: 115 UG/DL (ref 110–370)

## 2023-01-02 PROCEDURE — 83690 ASSAY OF LIPASE: CPT

## 2023-01-02 PROCEDURE — 83550 IRON BINDING TEST: CPT

## 2023-01-02 PROCEDURE — 700102 HCHG RX REV CODE 250 W/ 637 OVERRIDE(OP)

## 2023-01-02 PROCEDURE — 93010 ELECTROCARDIOGRAM REPORT: CPT | Performed by: INTERNAL MEDICINE

## 2023-01-02 PROCEDURE — 700111 HCHG RX REV CODE 636 W/ 250 OVERRIDE (IP)

## 2023-01-02 PROCEDURE — 83540 ASSAY OF IRON: CPT

## 2023-01-02 PROCEDURE — 84484 ASSAY OF TROPONIN QUANT: CPT

## 2023-01-02 PROCEDURE — 770020 HCHG ROOM/CARE - TELE (206)

## 2023-01-02 PROCEDURE — 96372 THER/PROPH/DIAG INJ SC/IM: CPT

## 2023-01-02 PROCEDURE — 84132 ASSAY OF SERUM POTASSIUM: CPT

## 2023-01-02 PROCEDURE — A9270 NON-COVERED ITEM OR SERVICE: HCPCS

## 2023-01-02 PROCEDURE — 83036 HEMOGLOBIN GLYCOSYLATED A1C: CPT

## 2023-01-02 PROCEDURE — 93005 ELECTROCARDIOGRAM TRACING: CPT | Performed by: STUDENT IN AN ORGANIZED HEALTH CARE EDUCATION/TRAINING PROGRAM

## 2023-01-02 PROCEDURE — 82728 ASSAY OF FERRITIN: CPT

## 2023-01-02 PROCEDURE — 700102 HCHG RX REV CODE 250 W/ 637 OVERRIDE(OP): Performed by: HOSPITALIST

## 2023-01-02 PROCEDURE — 99239 HOSP IP/OBS DSCHRG MGMT >30: CPT | Performed by: HOSPITALIST

## 2023-01-02 PROCEDURE — 80053 COMPREHEN METABOLIC PANEL: CPT

## 2023-01-02 PROCEDURE — A9270 NON-COVERED ITEM OR SERVICE: HCPCS | Performed by: HOSPITALIST

## 2023-01-02 PROCEDURE — 83735 ASSAY OF MAGNESIUM: CPT

## 2023-01-02 RX ORDER — POTASSIUM CHLORIDE 20 MEQ/1
40 TABLET, EXTENDED RELEASE ORAL 2 TIMES DAILY
Status: COMPLETED | OUTPATIENT
Start: 2023-01-02 | End: 2023-01-02

## 2023-01-02 RX ORDER — POTASSIUM CHLORIDE 20 MEQ/1
40 TABLET, EXTENDED RELEASE ORAL ONCE
Status: COMPLETED | OUTPATIENT
Start: 2023-01-02 | End: 2023-01-02

## 2023-01-02 RX ORDER — POTASSIUM CHLORIDE 7.45 MG/ML
10 INJECTION INTRAVENOUS
Status: DISCONTINUED | OUTPATIENT
Start: 2023-01-02 | End: 2023-01-02

## 2023-01-02 RX ADMIN — POTASSIUM CHLORIDE 10 MEQ: 7.46 INJECTION, SOLUTION INTRAVENOUS at 06:46

## 2023-01-02 RX ADMIN — HEPARIN SODIUM 5000 UNITS: 5000 INJECTION, SOLUTION INTRAVENOUS; SUBCUTANEOUS at 06:49

## 2023-01-02 RX ADMIN — DIBASIC SODIUM PHOSPHATE, MONOBASIC POTASSIUM PHOSPHATE AND MONOBASIC SODIUM PHOSPHATE 500 MG: 852; 155; 130 TABLET ORAL at 06:48

## 2023-01-02 RX ADMIN — HEPARIN SODIUM 5000 UNITS: 5000 INJECTION, SOLUTION INTRAVENOUS; SUBCUTANEOUS at 00:16

## 2023-01-02 RX ADMIN — POTASSIUM CHLORIDE 40 MEQ: 1500 TABLET, EXTENDED RELEASE ORAL at 06:49

## 2023-01-02 RX ADMIN — AMLODIPINE BESYLATE 10 MG: 5 TABLET ORAL at 06:49

## 2023-01-02 RX ADMIN — OMEPRAZOLE 20 MG: 20 CAPSULE, DELAYED RELEASE ORAL at 06:49

## 2023-01-02 RX ADMIN — POTASSIUM CHLORIDE 40 MEQ: 1500 TABLET, EXTENDED RELEASE ORAL at 10:56

## 2023-01-02 RX ADMIN — ATORVASTATIN CALCIUM 40 MG: 40 TABLET, FILM COATED ORAL at 00:15

## 2023-01-02 RX ADMIN — CARBAMAZEPINE 200 MG: 200 TABLET ORAL at 06:48

## 2023-01-02 RX ADMIN — DOCUSATE SODIUM 50 MG AND SENNOSIDES 8.6 MG 2 TABLET: 8.6; 5 TABLET, FILM COATED ORAL at 06:48

## 2023-01-02 NOTE — CARE PLAN
The patient is Watcher - Medium risk of patient condition declining or worsening         Progress made toward(s) clinical / shift goals:  Pt up from ed admitted s/p witnessed seizure at home. Pt with no s/s of seizure activity on floor, resolve also noted in nausea/vomiting. Mag running for repletion. IV antihypertensives given x1 with noted resolve. Pt A/O x3, drowsy but oriented.    Patient is not progressing towards the following goals: Awaiting further plan re: seizure management/elevated kidney levels.

## 2023-01-02 NOTE — PROGRESS NOTES
Bedside report received. POC discussed with pt; all questions answered at this time.        Telemetry Summary  Rhythm Interpretation:SB-SR  Ectopy: N/A  HR:58-64  WA:0.20  QRS:0.08  Qt:0.47

## 2023-01-02 NOTE — PROGRESS NOTES
Discussed K with MD. IV route canceled due to IV access limitations. PO route ordered. See MAR for admin

## 2023-01-02 NOTE — PROGRESS NOTES
Telemetry Summary  Rhythm Interpretation:SB-SR  Ectopy: N/A  HR:58-64  OK:0.20  QRS:0.08  Qt:0.47

## 2023-01-02 NOTE — PROGRESS NOTES
12 lead EKG performed with finding of ST depression noted. Offgoing dayshift nurse reported this finding to the [facility] team provider on call. No further orders received at this time.

## 2023-01-02 NOTE — CARE PLAN
The patient is Stable - Low risk of patient condition declining or worsening    Shift Goals  Clinical Goals: seizure monitoring, safety  Patient Goals: rest  Family Goals: KIMBERLY    Progress made toward(s) clinical / shift goals:    Problem: Knowledge Deficit - Standard  Goal: Patient and family/care givers will demonstrate understanding of plan of care, disease process/condition, diagnostic tests and medications  Outcome: Progressing       Patient is not progressing towards the following goals:

## 2023-01-03 NOTE — DISCHARGE SUMMARY
Discharge Summary    CHIEF COMPLAINT ON ADMISSION  Chief Complaint   Patient presents with    Diarrhea    Seizure       Reason for Admission  ems     Admission Date  1/1/2023    CODE STATUS  Prior    HPI & HOSPITAL COURSE  As per unr h+p    67 year-old with history of cannabis hyperemesis syndrome, TBI, epilespy s/p left hippocampal resection 2016, who presents with episode of seizure this morning. He is an active marijuana user via pipe and has diagnosis of cannabis hyperemesis syndrome. He is alert/awake but memory is impaired likely post-ictal so history is collaterally from his wife. He reports that he has had poor PO intake and vomiting/diarrhea since last night. His wife notes that he had about a 10-15 min seizure episode while in bed this morning prompting bringing him tot come to the ED. He was post-ictal but has improved. He reports compliance with his   Tegretol    ====================================================    The patient was hospitalized and given supportive care.  He had no new seizure activity during his hospital stay.  He was noted to have a low potassium and that was replaced by mouth as well as IV.  It was normal at the time of discharge.  Patient was cleared for discharge in 1-2 23 with no new changes to his medications      Therefore, he is discharged in good and stable condition to home with close outpatient follow-up.    The patient recovered much more quickly than anticipated on admission.    Discharge Date  1/2/2023    FOLLOW UP ITEMS POST DISCHARGE      DISCHARGE DIAGNOSES  Principal Problem:    Seizure (HCC) POA: Yes  Active Problems:    Hypophosphatemia POA: Yes    Hyperglycemia POA: Yes    Cannabis hyperemesis syndrome concurrent with and due to cannabis dependence (HCC) POA: Yes    Elevated alkaline phosphatase level POA: Yes    Hypertension POA: Yes    ADHD (attention deficit hyperactivity disorder), combined type POA: Yes      Overview:           Metabolic acidosis POA:  Unknown  Resolved Problems:    Hypomagnesemia POA: Yes    SHANA on CKD (chronic kidney disease) stage 4, GFR 15-29 ml/min ([facility]) POA: Yes      Overview: Formatting of this note might be different from the original.      Dr. Boogie, NEPHRO, [facility].    Hypertensive urgency POA: Unknown      FOLLOW UP  No future appointments.  Anna Barrera P.A.-C.  [address]  [facility] [address] [phone]            MEDICATIONS ON DISCHARGE     Medication List        CONTINUE taking these medications        Instructions   amLODIPine 10 MG Tabs  Commonly known as: NORVASC   Take 10 mg by mouth every day.  Dose: 10 mg     atorvastatin 40 MG Tabs  Commonly known as: LIPITOR   Take 40 mg by mouth every evening.  Dose: 40 mg     carBAMazepine 200 MG Tabs  Commonly known as: TEGRETOL   Take 200 mg by mouth 2 times a day.  Dose: 200 mg     Eplerenone 50 MG Tabs   Take 50 mg by mouth every day.  Dose: 50 mg     escitalopram 10 MG Tabs  Commonly known as: Lexapro   Take 10 mg by mouth every day.  Dose: 10 mg     losartan 50 MG Tabs  Commonly known as: COZAAR   Take 50 mg by mouth every day.  Dose: 50 mg     omeprazole 20 MG delayed-release capsule  Commonly known as: PRILOSEC   Take 20 mg by mouth every day.  Dose: 20 mg     phosphorus 250 MG tablet  Commonly known as: K-Phos-Neutral   Take 2 Tablets by mouth 2 times a day.  Dose: 2 Tablet     QUEtiapine 25 MG Tabs  Commonly known as: Seroquel   Take 25 mg by mouth every evening.  Dose: 25 mg            STOP taking these medications      potassium chloride SA 20 MEQ Tbcr  Commonly known as: Kdur              Allergies  No Known Allergies    DIET  No orders of the defined types were placed in this encounter.      ACTIVITY  As tolerated.  Weight bearing as tolerated    CONSULTATIONS      PROCEDURES      LABORATORY  Lab Results   Component Value Date    SODIUM 138 01/02/2023    POTASSIUM 3.8 01/02/2023    CHLORIDE 103 01/02/2023    CO2 21 01/02/2023    GLUCOSE 92 01/02/2023    BUN 17  01/02/2023    CREATININE 2.21 (H) 01/02/2023        Lab Results   Component Value Date    WBC 7.6 01/01/2023    HEMOGLOBIN 10.8 (L) 01/01/2023    HEMATOCRIT 31.9 (L) 01/01/2023    PLATELETCT 129 (L) 01/01/2023        Total time of the discharge process exceeds 39  minutes.

## 2023-05-12 NOTE — PROGRESS NOTES
Cardiology at bedside, applying ZIO patch.    Noted.     ----- Message from Aarti England PharmD sent at 5/12/2023  1:27 PM CDT -----  Regarding: Katelyn Zhao afternoon Denisha and Staff,    The Katelyn appeal was overturned and approved. I have made a call to the patient to inform her of the approval. I do see that the patient received her first loading dose in office on 5/2, the patient is okay with me calling her back on 5/23 for initial consult and set up shipment for her to inject on 5/30. She is okay with the copay of $60. I just wanted to inform you.    Thank you,  Stephanie GriffinD  Clinical Pharmacist, Financial Assistance Team  [facility]  (P): [phone]  (F): [phone]

## 2023-07-05 ENCOUNTER — HOSPITAL ENCOUNTER (OUTPATIENT)
Dept: LAB | Facility: MEDICAL CENTER | Age: 68
End: 2023-07-05
Attending: INTERNAL MEDICINE
Payer: MEDICARE

## 2023-07-05 LAB
ALBUMIN SERPL BCP-MCNC: 4.3 G/DL (ref 3.2–4.9)
APPEARANCE UR: CLEAR
BACTERIA #/AREA URNS HPF: NEGATIVE /HPF
BILIRUB UR QL STRIP.AUTO: NEGATIVE
BUN SERPL-MCNC: 17 MG/DL (ref 8–22)
CALCIUM ALBUM COR SERPL-MCNC: 6.9 MG/DL (ref 8.5–10.5)
CALCIUM SERPL-MCNC: 7.1 MG/DL (ref 8.5–10.5)
CHLORIDE SERPL-SCNC: 104 MMOL/L (ref 96–112)
CO2 SERPL-SCNC: 24 MMOL/L (ref 20–33)
COLOR UR: YELLOW
CREAT SERPL-MCNC: 2.44 MG/DL (ref 0.5–1.4)
CREAT UR-MCNC: 254.92 MG/DL
CREAT UR-MCNC: 260.6 MG/DL
EPI CELLS #/AREA URNS HPF: NEGATIVE /HPF
GFR SERPLBLD CREATININE-BSD FMLA CKD-EPI: 28 ML/MIN/1.73 M 2
GLUCOSE SERPL-MCNC: 108 MG/DL (ref 65–99)
GLUCOSE UR STRIP.AUTO-MCNC: NEGATIVE MG/DL
HYALINE CASTS #/AREA URNS LPF: ABNORMAL /LPF
KETONES UR STRIP.AUTO-MCNC: NEGATIVE MG/DL
LEUKOCYTE ESTERASE UR QL STRIP.AUTO: NEGATIVE
MAGNESIUM SERPL-MCNC: 1.2 MG/DL (ref 1.5–2.5)
MICRO URNS: ABNORMAL
MICROALBUMIN UR-MCNC: 5.3 MG/DL
MICROALBUMIN/CREAT UR: 20 MG/G (ref 0–30)
NITRITE UR QL STRIP.AUTO: NEGATIVE
PH UR STRIP.AUTO: 5.5 [PH] (ref 5–8)
PHOSPHATE SERPL-MCNC: 3.6 MG/DL (ref 2.5–4.5)
POTASSIUM SERPL-SCNC: 2.9 MMOL/L (ref 3.6–5.5)
PROT UR QL STRIP: 30 MG/DL
PROT UR-MCNC: 47 MG/DL (ref 0–15)
PROT/CREAT UR: 184 MG/G (ref 15–68)
RBC # URNS HPF: ABNORMAL /HPF
RBC UR QL AUTO: NEGATIVE
SODIUM SERPL-SCNC: 145 MMOL/L (ref 135–145)
SP GR UR STRIP.AUTO: 1.02
UROBILINOGEN UR STRIP.AUTO-MCNC: 1 MG/DL
WBC #/AREA URNS HPF: ABNORMAL /HPF

## 2023-07-05 PROCEDURE — 36415 COLL VENOUS BLD VENIPUNCTURE: CPT

## 2023-07-05 PROCEDURE — 84165 PROTEIN E-PHORESIS SERUM: CPT

## 2023-07-05 PROCEDURE — 84166 PROTEIN E-PHORESIS/URINE/CSF: CPT

## 2023-07-05 PROCEDURE — 84155 ASSAY OF PROTEIN SERUM: CPT

## 2023-07-05 PROCEDURE — 83521 IG LIGHT CHAINS FREE EACH: CPT

## 2023-07-05 PROCEDURE — 86335 IMMUNFIX E-PHORSIS/URINE/CSF: CPT

## 2023-07-05 PROCEDURE — 84156 ASSAY OF PROTEIN URINE: CPT

## 2023-07-05 PROCEDURE — 81001 URINALYSIS AUTO W/SCOPE: CPT

## 2023-07-05 PROCEDURE — 82043 UR ALBUMIN QUANTITATIVE: CPT

## 2023-07-05 PROCEDURE — 83735 ASSAY OF MAGNESIUM: CPT

## 2023-07-05 PROCEDURE — 82570 ASSAY OF URINE CREATININE: CPT | Mod: 91

## 2023-07-05 PROCEDURE — 80069 RENAL FUNCTION PANEL: CPT

## 2023-07-05 PROCEDURE — 86235 NUCLEAR ANTIGEN ANTIBODY: CPT | Mod: 91

## 2023-07-07 LAB
ENA SM IGG SER-ACNC: 2 AU/ML (ref 0–40)
KAPPA LC FREE SER-MCNC: 43.26 MG/L (ref 3.3–19.4)
KAPPA LC FREE/LAMBDA FREE SER NEPH: 1.49 {RATIO} (ref 0.26–1.65)
LAMBDA LC FREE SERPL-MCNC: 29.08 MG/L (ref 5.71–26.3)
U1 SNRNP IGG SER QL: 2 UNITS (ref 0–19)

## 2023-07-08 LAB
ALBUMIN SERPL ELPH-MCNC: 3.93 G/DL (ref 3.75–5.01)
ALPHA1 GLOB SERPL ELPH-MCNC: 0.29 G/DL (ref 0.19–0.46)
ALPHA2 GLOB SERPL ELPH-MCNC: 0.8 G/DL (ref 0.48–1.05)
B-GLOBULIN SERPL ELPH-MCNC: 0.7 G/DL (ref 0.48–1.1)
GAMMA GLOB SERPL ELPH-MCNC: 0.88 G/DL (ref 0.62–1.51)
INTERPRETATION SERPL IFE-IMP: NORMAL
MONOCLON BAND OBS SERPL: NORMAL
MONOCLONAL PROTEIN NL11656: NORMAL G/DL
PATHOLOGY STUDY: NORMAL
PROT SERPL-MCNC: 6.6 G/DL (ref 6.3–8.2)

## 2023-07-10 LAB
ALBUMIN 24H MFR UR ELPH: 23.2 %
ALPHA1 GLOB 24H MFR UR ELPH: 14.1 %
ALPHA2 GLOB 24H MFR UR ELPH: 25.7 %
B-GLOBULIN 24H MFR UR ELPH: 35.8 %
COLLECT DURATION TIME SPEC: NORMAL HRS
EER MONOCLONAL PROTEIN STUDY, 24 HOUR U Q5964: NORMAL
GAMMA GLOB 24H MFR UR ELPH: 1.2 %
INTERPRETATION UR IFE-IMP: NORMAL
M PROTEIN 24H MFR UR ELPH: 0 %
M PROTEIN 24H UR ELPH-MRATE: NORMAL MG/24 HRS
PROT 24H UR-MRATE: NORMAL MG/D (ref 40–150)
PROT UR-MCNC: 45 MG/DL
SPECIMEN VOL ?TM UR: NORMAL ML

## 2023-12-13 ENCOUNTER — APPOINTMENT (OUTPATIENT)
Dept: RADIOLOGY | Facility: MEDICAL CENTER | Age: 68
DRG: 683 | End: 2023-12-13
Attending: EMERGENCY MEDICINE
Payer: MEDICARE

## 2023-12-13 ENCOUNTER — HOSPITAL ENCOUNTER (INPATIENT)
Facility: MEDICAL CENTER | Age: 68
LOS: 2 days | DRG: 683 | End: 2023-12-15
Attending: EMERGENCY MEDICINE | Admitting: INTERNAL MEDICINE
Payer: MEDICARE

## 2023-12-13 DIAGNOSIS — E87.5 HYPERKALEMIA: ICD-10-CM

## 2023-12-13 DIAGNOSIS — N17.9 ACUTE RENAL FAILURE, UNSPECIFIED ACUTE RENAL FAILURE TYPE (HCC): ICD-10-CM

## 2023-12-13 PROBLEM — N18.5 CKD (CHRONIC KIDNEY DISEASE) STAGE 5, GFR LESS THAN 15 ML/MIN (HCC): Status: ACTIVE | Noted: 2023-12-13

## 2023-12-13 LAB
ALBUMIN SERPL BCP-MCNC: 4.9 G/DL (ref 3.2–4.9)
ALBUMIN/GLOB SERPL: 1.5 G/DL
ALP SERPL-CCNC: 172 U/L (ref 30–99)
ALT SERPL-CCNC: 11 U/L (ref 2–50)
ANION GAP SERPL CALC-SCNC: 13 MMOL/L (ref 7–16)
APPEARANCE UR: CLEAR
AST SERPL-CCNC: 7 U/L (ref 12–45)
BACTERIA #/AREA URNS HPF: NEGATIVE /HPF
BASOPHILS # BLD AUTO: 0.2 % (ref 0–1.8)
BASOPHILS # BLD: 0.02 K/UL (ref 0–0.12)
BILIRUB SERPL-MCNC: 0.3 MG/DL (ref 0.1–1.5)
BILIRUB UR QL STRIP.AUTO: NEGATIVE
BUN SERPL-MCNC: 30 MG/DL (ref 8–22)
CALCIUM ALBUM COR SERPL-MCNC: 9.8 MG/DL (ref 8.5–10.5)
CALCIUM SERPL-MCNC: 10.5 MG/DL (ref 8.5–10.5)
CHLORIDE SERPL-SCNC: 108 MMOL/L (ref 96–112)
CK SERPL-CCNC: 67 U/L (ref 0–154)
CO2 SERPL-SCNC: 17 MMOL/L (ref 20–33)
COLOR UR: YELLOW
CREAT SERPL-MCNC: 4.21 MG/DL (ref 0.5–1.4)
EKG IMPRESSION: NORMAL
EOSINOPHIL # BLD AUTO: 0 K/UL (ref 0–0.51)
EOSINOPHIL NFR BLD: 0 % (ref 0–6.9)
EPI CELLS #/AREA URNS HPF: NEGATIVE /HPF
ERYTHROCYTE [DISTWIDTH] IN BLOOD BY AUTOMATED COUNT: 45.5 FL (ref 35.9–50)
GFR SERPLBLD CREATININE-BSD FMLA CKD-EPI: 14 ML/MIN/1.73 M 2
GLOBULIN SER CALC-MCNC: 3.3 G/DL (ref 1.9–3.5)
GLUCOSE BLD STRIP.AUTO-MCNC: 136 MG/DL (ref 65–99)
GLUCOSE SERPL-MCNC: 128 MG/DL (ref 65–99)
GLUCOSE UR STRIP.AUTO-MCNC: 100 MG/DL
HCT VFR BLD AUTO: 43.4 % (ref 42–52)
HGB BLD-MCNC: 13.7 G/DL (ref 14–18)
HYALINE CASTS #/AREA URNS LPF: ABNORMAL /LPF
IMM GRANULOCYTES # BLD AUTO: 0.03 K/UL (ref 0–0.11)
IMM GRANULOCYTES NFR BLD AUTO: 0.3 % (ref 0–0.9)
KETONES UR STRIP.AUTO-MCNC: NEGATIVE MG/DL
LEUKOCYTE ESTERASE UR QL STRIP.AUTO: ABNORMAL
LYMPHOCYTES # BLD AUTO: 1.53 K/UL (ref 1–4.8)
LYMPHOCYTES NFR BLD: 14.8 % (ref 22–41)
MAGNESIUM SERPL-MCNC: 1.5 MG/DL (ref 1.5–2.5)
MCH RBC QN AUTO: 30.3 PG (ref 27–33)
MCHC RBC AUTO-ENTMCNC: 31.6 G/DL (ref 32.3–36.5)
MCV RBC AUTO: 96 FL (ref 81.4–97.8)
MICRO URNS: ABNORMAL
MONOCYTES # BLD AUTO: 0.82 K/UL (ref 0–0.85)
MONOCYTES NFR BLD AUTO: 7.9 % (ref 0–13.4)
NEUTROPHILS # BLD AUTO: 7.92 K/UL (ref 1.82–7.42)
NEUTROPHILS NFR BLD: 76.8 % (ref 44–72)
NITRITE UR QL STRIP.AUTO: NEGATIVE
NRBC # BLD AUTO: 0 K/UL
NRBC BLD-RTO: 0 /100 WBC (ref 0–0.2)
PH UR STRIP.AUTO: 5 [PH] (ref 5–8)
PHOSPHATE SERPL-MCNC: 2.4 MG/DL (ref 2.5–4.5)
PLATELET # BLD AUTO: 206 K/UL (ref 164–446)
PMV BLD AUTO: 10.1 FL (ref 9–12.9)
POTASSIUM SERPL-SCNC: 6.6 MMOL/L (ref 3.6–5.5)
PROT SERPL-MCNC: 8.2 G/DL (ref 6–8.2)
PROT UR QL STRIP: NEGATIVE MG/DL
RBC # BLD AUTO: 4.52 M/UL (ref 4.7–6.1)
RBC # URNS HPF: ABNORMAL /HPF
RBC UR QL AUTO: NEGATIVE
SODIUM SERPL-SCNC: 138 MMOL/L (ref 135–145)
SP GR UR STRIP.AUTO: 1.01
URATE SERPL-MCNC: 6.8 MG/DL (ref 2.5–8.3)
UROBILINOGEN UR STRIP.AUTO-MCNC: 0.2 MG/DL
WBC # BLD AUTO: 10.3 K/UL (ref 4.8–10.8)
WBC #/AREA URNS HPF: ABNORMAL /HPF

## 2023-12-13 PROCEDURE — 700102 HCHG RX REV CODE 250 W/ 637 OVERRIDE(OP): Performed by: INTERNAL MEDICINE

## 2023-12-13 PROCEDURE — 76775 US EXAM ABDO BACK WALL LIM: CPT

## 2023-12-13 PROCEDURE — 84550 ASSAY OF BLOOD/URIC ACID: CPT

## 2023-12-13 PROCEDURE — 99285 EMERGENCY DEPT VISIT HI MDM: CPT

## 2023-12-13 PROCEDURE — 96375 TX/PRO/DX INJ NEW DRUG ADDON: CPT

## 2023-12-13 PROCEDURE — 83735 ASSAY OF MAGNESIUM: CPT

## 2023-12-13 PROCEDURE — 82550 ASSAY OF CK (CPK): CPT

## 2023-12-13 PROCEDURE — 81001 URINALYSIS AUTO W/SCOPE: CPT

## 2023-12-13 PROCEDURE — 770020 HCHG ROOM/CARE - TELE (206)

## 2023-12-13 PROCEDURE — 700105 HCHG RX REV CODE 258: Performed by: INTERNAL MEDICINE

## 2023-12-13 PROCEDURE — 96374 THER/PROPH/DIAG INJ IV PUSH: CPT

## 2023-12-13 PROCEDURE — 700102 HCHG RX REV CODE 250 W/ 637 OVERRIDE(OP): Performed by: EMERGENCY MEDICINE

## 2023-12-13 PROCEDURE — 84100 ASSAY OF PHOSPHORUS: CPT

## 2023-12-13 PROCEDURE — 80053 COMPREHEN METABOLIC PANEL: CPT

## 2023-12-13 PROCEDURE — 85025 COMPLETE CBC W/AUTO DIFF WBC: CPT

## 2023-12-13 PROCEDURE — 99222 1ST HOSP IP/OBS MODERATE 55: CPT | Performed by: INTERNAL MEDICINE

## 2023-12-13 PROCEDURE — 700111 HCHG RX REV CODE 636 W/ 250 OVERRIDE (IP): Performed by: INTERNAL MEDICINE

## 2023-12-13 PROCEDURE — A9270 NON-COVERED ITEM OR SERVICE: HCPCS | Performed by: INTERNAL MEDICINE

## 2023-12-13 PROCEDURE — 700105 HCHG RX REV CODE 258: Performed by: EMERGENCY MEDICINE

## 2023-12-13 PROCEDURE — 99223 1ST HOSP IP/OBS HIGH 75: CPT | Mod: AI | Performed by: INTERNAL MEDICINE

## 2023-12-13 PROCEDURE — 93005 ELECTROCARDIOGRAM TRACING: CPT | Performed by: EMERGENCY MEDICINE

## 2023-12-13 PROCEDURE — 36415 COLL VENOUS BLD VENIPUNCTURE: CPT

## 2023-12-13 PROCEDURE — 82962 GLUCOSE BLOOD TEST: CPT

## 2023-12-13 RX ORDER — SODIUM BICARBONATE 650 MG/1
650 TABLET ORAL
Status: DISCONTINUED | OUTPATIENT
Start: 2023-12-13 | End: 2023-12-13

## 2023-12-13 RX ORDER — OMEPRAZOLE 20 MG/1
20 CAPSULE, DELAYED RELEASE ORAL DAILY
Status: DISCONTINUED | OUTPATIENT
Start: 2023-12-14 | End: 2023-12-15 | Stop reason: HOSPADM

## 2023-12-13 RX ORDER — POLYETHYLENE GLYCOL 3350 17 G/17G
1 POWDER, FOR SOLUTION ORAL
Status: DISCONTINUED | OUTPATIENT
Start: 2023-12-13 | End: 2023-12-15 | Stop reason: HOSPADM

## 2023-12-13 RX ORDER — LOPERAMIDE HYDROCHLORIDE 2 MG/1
4 CAPSULE ORAL 2 TIMES DAILY
COMMUNITY

## 2023-12-13 RX ORDER — AMLODIPINE BESYLATE 10 MG/1
10 TABLET ORAL DAILY
Status: DISCONTINUED | OUTPATIENT
Start: 2023-12-14 | End: 2023-12-15 | Stop reason: HOSPADM

## 2023-12-13 RX ORDER — SODIUM CHLORIDE 9 MG/ML
1000 INJECTION, SOLUTION INTRAVENOUS ONCE
Status: COMPLETED | OUTPATIENT
Start: 2023-12-13 | End: 2023-12-13

## 2023-12-13 RX ORDER — ACETAMINOPHEN 325 MG/1
650 TABLET ORAL EVERY 6 HOURS PRN
Status: DISCONTINUED | OUTPATIENT
Start: 2023-12-13 | End: 2023-12-15 | Stop reason: HOSPADM

## 2023-12-13 RX ORDER — HYDRALAZINE HYDROCHLORIDE 20 MG/ML
10 INJECTION INTRAMUSCULAR; INTRAVENOUS EVERY 4 HOURS PRN
Status: DISCONTINUED | OUTPATIENT
Start: 2023-12-13 | End: 2023-12-15 | Stop reason: HOSPADM

## 2023-12-13 RX ORDER — ONDANSETRON 4 MG/1
4 TABLET, ORALLY DISINTEGRATING ORAL EVERY 4 HOURS PRN
Status: DISCONTINUED | OUTPATIENT
Start: 2023-12-13 | End: 2023-12-15 | Stop reason: HOSPADM

## 2023-12-13 RX ORDER — AMOXICILLIN 250 MG
2 CAPSULE ORAL 2 TIMES DAILY
Status: DISCONTINUED | OUTPATIENT
Start: 2023-12-13 | End: 2023-12-15 | Stop reason: HOSPADM

## 2023-12-13 RX ORDER — SODIUM BICARBONATE 650 MG/1
650 TABLET ORAL 3 TIMES DAILY
Status: DISCONTINUED | OUTPATIENT
Start: 2023-12-14 | End: 2023-12-15 | Stop reason: HOSPADM

## 2023-12-13 RX ORDER — SODIUM BICARBONATE IN D5W 150/1000ML
PLASTIC BAG, INJECTION (ML) INTRAVENOUS CONTINUOUS
Status: DISCONTINUED | OUTPATIENT
Start: 2023-12-13 | End: 2023-12-15

## 2023-12-13 RX ORDER — TAMSULOSIN HYDROCHLORIDE 0.4 MG/1
0.4 CAPSULE ORAL
Status: DISCONTINUED | OUTPATIENT
Start: 2023-12-13 | End: 2023-12-15 | Stop reason: HOSPADM

## 2023-12-13 RX ORDER — HEPARIN SODIUM 5000 [USP'U]/ML
5000 INJECTION, SOLUTION INTRAVENOUS; SUBCUTANEOUS EVERY 8 HOURS
Status: DISCONTINUED | OUTPATIENT
Start: 2023-12-13 | End: 2023-12-15 | Stop reason: HOSPADM

## 2023-12-13 RX ORDER — ONDANSETRON 2 MG/ML
4 INJECTION INTRAMUSCULAR; INTRAVENOUS EVERY 4 HOURS PRN
Status: DISCONTINUED | OUTPATIENT
Start: 2023-12-13 | End: 2023-12-15 | Stop reason: HOSPADM

## 2023-12-13 RX ORDER — ATORVASTATIN CALCIUM 40 MG/1
40 TABLET, FILM COATED ORAL NIGHTLY
Status: DISCONTINUED | OUTPATIENT
Start: 2023-12-13 | End: 2023-12-15 | Stop reason: HOSPADM

## 2023-12-13 RX ORDER — QUETIAPINE FUMARATE 25 MG/1
25 TABLET, FILM COATED ORAL NIGHTLY
Status: DISCONTINUED | OUTPATIENT
Start: 2023-12-13 | End: 2023-12-15 | Stop reason: HOSPADM

## 2023-12-13 RX ORDER — CARBAMAZEPINE 200 MG/1
200 TABLET ORAL 2 TIMES DAILY
Status: DISCONTINUED | OUTPATIENT
Start: 2023-12-13 | End: 2023-12-15 | Stop reason: HOSPADM

## 2023-12-13 RX ORDER — ESCITALOPRAM OXALATE 10 MG/1
10 TABLET ORAL DAILY
Status: DISCONTINUED | OUTPATIENT
Start: 2023-12-14 | End: 2023-12-15 | Stop reason: HOSPADM

## 2023-12-13 RX ORDER — BISACODYL 10 MG
10 SUPPOSITORY, RECTAL RECTAL
Status: DISCONTINUED | OUTPATIENT
Start: 2023-12-13 | End: 2023-12-15 | Stop reason: HOSPADM

## 2023-12-13 RX ORDER — POTASSIUM CHLORIDE 20 MEQ/1
20 TABLET, EXTENDED RELEASE ORAL 2 TIMES DAILY
Status: ON HOLD | COMMUNITY
End: 2023-12-15

## 2023-12-13 RX ORDER — CALCITRIOL 0.25 UG/1
0.25 CAPSULE, LIQUID FILLED ORAL DAILY
COMMUNITY

## 2023-12-13 RX ADMIN — INSULIN HUMAN 4 UNITS: 100 INJECTION, SOLUTION PARENTERAL at 16:22

## 2023-12-13 RX ADMIN — SODIUM BICARBONATE: 84 INJECTION, SOLUTION INTRAVENOUS at 18:33

## 2023-12-13 RX ADMIN — TAMSULOSIN HYDROCHLORIDE 0.4 MG: 0.4 CAPSULE ORAL at 18:28

## 2023-12-13 RX ADMIN — SODIUM CHLORIDE 1000 ML: 9 INJECTION, SOLUTION INTRAVENOUS at 16:03

## 2023-12-13 RX ADMIN — QUETIAPINE FUMARATE 25 MG: 25 TABLET ORAL at 21:09

## 2023-12-13 RX ADMIN — CARBAMAZEPINE 200 MG: 200 TABLET ORAL at 18:28

## 2023-12-13 RX ADMIN — HEPARIN SODIUM 5000 UNITS: 5000 INJECTION, SOLUTION INTRAVENOUS; SUBCUTANEOUS at 21:09

## 2023-12-13 RX ADMIN — ATORVASTATIN CALCIUM 40 MG: 40 TABLET, FILM COATED ORAL at 21:09

## 2023-12-13 RX ADMIN — DEXTROSE MONOHYDRATE 25 G: 100 INJECTION, SOLUTION INTRAVENOUS at 16:05

## 2023-12-13 ASSESSMENT — ENCOUNTER SYMPTOMS
CHILLS: 0
WEAKNESS: 1
HEADACHES: 0
FLANK PAIN: 0
POLYDIPSIA: 0
FEVER: 0
HEMOPTYSIS: 0
SPEECH CHANGE: 0
NAUSEA: 1
FOCAL WEAKNESS: 0
PALPITATIONS: 0
VOMITING: 1
ABDOMINAL PAIN: 0
HALLUCINATIONS: 0
ORTHOPNEA: 0
TREMORS: 0
COUGH: 0
DOUBLE VISION: 0
NERVOUS/ANXIOUS: 0
SPUTUM PRODUCTION: 0
BLURRED VISION: 0
BRUISES/BLEEDS EASILY: 0
BACK PAIN: 0
NECK PAIN: 0
HEARTBURN: 0
PHOTOPHOBIA: 0
WEIGHT LOSS: 0

## 2023-12-13 ASSESSMENT — LIFESTYLE VARIABLES
CONSUMPTION TOTAL: NEGATIVE
DO YOU DRINK ALCOHOL: NO
TOTAL SCORE: 0
EVER FELT BAD OR GUILTY ABOUT YOUR DRINKING: NO
HOW MANY TIMES IN THE PAST YEAR HAVE YOU HAD 5 OR MORE DRINKS IN A DAY: 0
AVERAGE NUMBER OF DAYS PER WEEK YOU HAVE A DRINK CONTAINING ALCOHOL: 0
TOTAL SCORE: 0
HAVE YOU EVER FELT YOU SHOULD CUT DOWN ON YOUR DRINKING: NO
EVER HAD A DRINK FIRST THING IN THE MORNING TO STEADY YOUR NERVES TO GET RID OF A HANGOVER: NO
TOTAL SCORE: 0
SUBSTANCE_ABUSE: 0
ON A TYPICAL DAY WHEN YOU DRINK ALCOHOL HOW MANY DRINKS DO YOU HAVE: 0
HAVE PEOPLE ANNOYED YOU BY CRITICIZING YOUR DRINKING: NO

## 2023-12-13 ASSESSMENT — PAIN DESCRIPTION - PAIN TYPE
TYPE: ACUTE PAIN
TYPE: ACUTE PAIN

## 2023-12-13 ASSESSMENT — FIBROSIS 4 INDEX: FIB4 SCORE: 3.21

## 2023-12-13 NOTE — ED PROVIDER NOTES
ED Provider Note    CHIEF COMPLAINT  Chief Complaint   Patient presents with    Weakness     Pt transferred from [facility] after presenting this morning for generalized weakness that started on Monday. Pt A+Ox4.     EXTERNAL RECORDS REVIEWED  Patient was seen at [facility] and transferred here today for renal failure with an elevated potassium.  His initial potassium was 8 at the outside hospital with EKG changes.  He received calcium, dextrose/insulin, albuterol and 1 amp of bicarb.  Appears to have received 1 L normal saline as well.  He was last seen by his primary care physician 9/29/2023.  It appears he has been treated with potassium supplementation for low potassium in the past.  He is also on medication for seizure disorder, hypertension    HPI/ROS  LIMITATION TO HISTORY   Select: : None  OUTSIDE HISTORIAN(S):  EMS see below    Roby Viramontes II is a 67 y.o. male who presents to the Emergency Department as a transfer from outside hospital due to severely elevated potassium and acute renal failure.  The patient reports he has been feeling fatigued and generalized weakness for the last 4 to 5 days.  He is typically very active and skis frequently and was not able to do this due to his symptoms.  He denies any pain at this time.  He does report increased urination especially at nighttime.  When asked if he drinks water he answers I drink Pepsi.  Denies any alcohol or drug use.  He does take NSAIDs such as ibuprofen from time to time but nothing regularly.  He has been on potassium supplementation for some time due to chronically low potassium.  He has a history of cannabis hyperemesis syndrome and has not used marijuana in over a year.    PAST MEDICAL HISTORY  Past Medical History:   Diagnosis Date    CKD (chronic kidney disease) stage 4, GFR 15-29 ml/min ([facility]) 6/22/2021    Dyslipidemia     Hypertension     Hypophosphatemia     Psychiatric problem     ADHD/ depression    Seizure disorder  (HCC)         SURGICAL HISTORY  Past Surgical History:   Procedure Laterality Date    GASTROSCOPY-ENDO  7/10/2020    Procedure: GASTROSCOPY;  Surgeon: Chad Yen M.D.;  Location: SURGERY [facility];  Service: Gastroenterology        FAMILY HISTORY  History reviewed. No pertinent family history.    SOCIAL HISTORY   reports that he quit smoking about 33 years ago. His smoking use included cigarettes. He has never used smokeless tobacco. He reports that he does not currently use alcohol. He reports current drug use. Drugs: Marijuana and Inhaled.    CURRENT MEDICATIONS  Current Discharge Medication List        CONTINUE these medications which have NOT CHANGED    Details   potassium chloride SA (KDUR) 20 MEQ Tab CR Take 20 mEq by mouth 2 times a day.      loperamide (IMODIUM) 2 MG Cap Take 4 mg by mouth 2 times a day.      calcitRIOL (ROCALTROL) 0.25 MCG Cap Take 0.25 mcg by mouth every day.      phosphorus (K-PHOS-NEUTRAL) 250 MG tablet Take 2 Tablets by mouth 2 times a day.  Qty: 30 Tablet, Refills: 0    Associated Diagnoses: Hypophosphatemia      omeprazole (PRILOSEC) 40 MG delayed-release capsule Take 40 mg by mouth every day.      QUEtiapine (SEROQUEL) 25 MG Tab Take 25 mg by mouth every evening.      escitalopram (LEXAPRO) 10 MG Tab Take 10 mg by mouth every day.      carBAMazepine (TEGRETOL) 200 MG Tab Take 200 mg by mouth 2 times a day.      amLODIPine (NORVASC) 10 MG Tab Take 10 mg by mouth every day.      losartan (COZAAR) 50 MG Tab Take 50 mg by mouth every day.      atorvastatin (LIPITOR) 40 MG Tab Take 40 mg by mouth every evening.             ALLERGIES  Patient has no known allergies.    PHYSICAL EXAM  BP (!) 145/73   Pulse 68   Temp 36.9 °C (98.4 °F) (Temporal)   Resp 14   Ht 1.829 m (6')   Wt 84.4 kg (186 lb)   SpO2 97%      Constitutional: Nontoxic appearing. Alert in no apparent distress.  HENT: Normocephalic, Atraumatic. Bilateral external ears normal. Nose normal.  Moist mucous  membranes.  Oropharynx clear.  Eyes: Pupils are equal and reactive. Conjunctiva normal.   Neck: Supple, full range of motion  Heart: Regular rate and rhythm.  No murmurs.    Lungs: No respiratory distress, normal work of breathing. Lungs clear to auscultation bilaterally.  Abdomen Soft, no distention.  No tenderness to palpation.  Musculoskeletal: Atraumatic. No obvious deformities noted.  No lower extremity edema.  Skin: Warm, Dry.  No erythema, No rash.   Neurologic: Alert and oriented x3. Moving all extremities spontaneously without focal deficits.  Psychiatric: Affect normal, Mood normal, Appears appropriate and not intoxicated.      DIAGNOSTIC STUDIES / PROCEDURES    EKG  I have independently interpreted this EKG  Results for orders placed or performed during the hospital encounter of 23   EKG   Result Value Ref Range    Report       [facility] Emergency Dept.    Test Date:  2023  Pt Name:    BRISEYDA CHAUHAN            Department: ER  MRN:        [id number]                      Room:       [facility]  Gender:     Male                         Technician: [id number]  :        1955                   Requested By:ER TRIAGE PROTOCOL  Order #:    [id number]                    Reading MD: Radha Preciado MD    Measurements  Intervals                                Axis  Rate:       79                           P:          70  FL:         217                          QRS:        185  QRSD:       101                          T:          67  QT:         358  QTc:        411    Interpretive Statements  Sinus rhythm  Borderline prolonged FL interval  Minimal ST elevation, inferior leads  No STEMI  Compared to ECG 2023 07:03:54  Ventricular premature complex(es) no longer present  No other significant change  Electronically Signed On 2023 14:59:57 PST by Radha Preciado MD         LABS  Labs Reviewed   CBC WITH DIFFERENTIAL - Abnormal; Notable for the following components:       Result  Value    RBC 4.52 (*)     Hemoglobin 13.7 (*)     MCHC 31.6 (*)     Neutrophils-Polys 76.80 (*)     Lymphocytes 14.80 (*)     Neutrophils (Absolute) 7.92 (*)     All other components within normal limits   COMP METABOLIC PANEL - Abnormal; Notable for the following components:    Potassium 6.6 (*)     Co2 17 (*)     Glucose 128 (*)     Bun 30 (*)     Creatinine 4.21 (*)     AST(SGOT) 7 (*)     Alkaline Phosphatase 172 (*)     All other components within normal limits   URINALYSIS,CULTURE IF INDICATED - Abnormal; Notable for the following components:    Glucose 100 (*)     Leukocyte Esterase Trace (*)     All other components within normal limits    Narrative:     Indication for culture:->Patient WITHOUT an indwelling Chavis  catheter in place with new onset of Dysuria, Frequency,  Urgency, and/or Suprapubic pain   PHOSPHORUS - Abnormal; Notable for the following components:    Phosphorus 2.4 (*)     All other components within normal limits   ESTIMATED GFR - Abnormal; Notable for the following components:    GFR (CKD-EPI) 14 (*)     All other components within normal limits   URINE MICROSCOPIC (W/UA) - Abnormal; Notable for the following components:    WBC 0-2 (*)     RBC 0-2 (*)     All other components within normal limits    Narrative:     Indication for culture:->Patient WITHOUT an indwelling Chavis  catheter in place with new onset of Dysuria, Frequency,  Urgency, and/or Suprapubic pain   POCT GLUCOSE DEVICE RESULTS - Abnormal; Notable for the following components:    POC Glucose, Blood 136 (*)     All other components within normal limits   MAGNESIUM   URIC ACID   CREATINE KINASE   URINE SODIUM RANDOM   URINE CREATININE RANDOM   POCT GLUCOSE   POCT GLUCOSE   POCT GLUCOSE   POCT GLUCOSE         RADIOLOGY  I have independently interpreted the diagnostic imaging associated with this visit and am waiting the final reading from the radiologist.   My preliminary interpretation is a follows: no  hydronephrosis  Radiologist interpretation:   US-RENAL   Final Result      1.  Features of medical renal disease.   2.  No hydronephrosis.            COURSE & MEDICAL DECISION MAKING    ED Observation Status? No; Patient does not meet criteria for ED Observation.     INITIAL ASSESSMENT, COURSE AND PLAN  Care Narrative: Patient who was transferred from outside hospital for acute renal failure with a potassium of 8.  He was given multiple medications for this prior to transfer.  He is afebrile with reassuring vital signs on arrival.  His EKG does not show evidence of ischemia or arrhythmia.  No changes concerning for hyperkalemia.  His potassium did return persistently elevated at 6.6 however improved from prior.  He has associated elevated creatinine.  No other electrolyte abnormalities.  Ultrasound without evidence of hydronephrosis or obstructive process.  Patient will be given further fluids as well as insulin and dextrose with a plan to consult neurology and admit.      ADDITIONAL PROBLEM LIST  Problem #1: Acute hyperkalemia - improving with interventions, will plan to admit for further hydration and monitoring     Problem #2: Acute renal failure - see above      DISPOSITION AND DISCUSSIONS  I have discussed management of the patient with the following physicians and ROSA's:    Dr. Najjar -nephrologist on-call, who recommends admission for further hydration  Dr. Saini, hospitalist on-call, who accepts admission of the patient    CRITICAL CARE  The very real possibilty of a deterioration of this patient's condition required the highest level of my preparedness for sudden, emergent intervention.  I provided critical care services, which included medication orders, frequent reevaluations of the patient's condition and response to treatment, ordering and reviewing test results, and discussing the case with various consultants.  The critical care time associated with the care of the patient was 33 minutes. Review  chart for interventions. This time is exclusive of any other billable procedures.       DISPOSITION:  Patient will be hospitalized by Dr. Saini in guarded condition.      FINAL DIAGNOSIS  1. Hyperkalemia    2. Acute renal failure, unspecified acute renal failure type (HCC)

## 2023-12-13 NOTE — ED TRIAGE NOTES
"Chief Complaint   Patient presents with    Weakness     Pt transferred from [facility] after presenting this morning for generalized weakness that started on Monday. Pt A+Ox4.         Pt transferred from [facility] for above complaint. Pt was found to have high potassium (8.0) and given the hyperkalemia protocol by [facility].  Pt reports feeling generalized weakness after skiing on Monday.     BP (!) 142/73   Pulse 89   Temp 36.3 °C (97.4 °F) (Temporal)   Resp 18   Ht 1.803 m (5' 11\")   Wt 84.4 kg (186 lb)   SpO2 96%     "

## 2023-12-14 LAB
ALBUMIN SERPL BCP-MCNC: 3.9 G/DL (ref 3.2–4.9)
ALBUMIN SERPL BCP-MCNC: 4 G/DL (ref 3.2–4.9)
ALBUMIN/GLOB SERPL: 1.5 G/DL
ALP SERPL-CCNC: 138 U/L (ref 30–99)
ALT SERPL-CCNC: 9 U/L (ref 2–50)
ANION GAP SERPL CALC-SCNC: 12 MMOL/L (ref 7–16)
AST SERPL-CCNC: 6 U/L (ref 12–45)
BASOPHILS # BLD AUTO: 0.3 % (ref 0–1.8)
BASOPHILS # BLD: 0.02 K/UL (ref 0–0.12)
BILIRUB SERPL-MCNC: 0.3 MG/DL (ref 0.1–1.5)
BUN SERPL-MCNC: 31 MG/DL (ref 8–22)
BUN SERPL-MCNC: 31 MG/DL (ref 8–22)
CALCIUM ALBUM COR SERPL-MCNC: 8.4 MG/DL (ref 8.5–10.5)
CALCIUM ALBUM COR SERPL-MCNC: 8.9 MG/DL (ref 8.5–10.5)
CALCIUM SERPL-MCNC: 8.4 MG/DL (ref 8.5–10.5)
CALCIUM SERPL-MCNC: 8.8 MG/DL (ref 8.5–10.5)
CHLORIDE SERPL-SCNC: 106 MMOL/L (ref 96–112)
CHLORIDE SERPL-SCNC: 97 MMOL/L (ref 96–112)
CO2 SERPL-SCNC: 19 MMOL/L (ref 20–33)
CO2 SERPL-SCNC: 25 MMOL/L (ref 20–33)
CREAT SERPL-MCNC: 4.43 MG/DL (ref 0.5–1.4)
CREAT SERPL-MCNC: 4.46 MG/DL (ref 0.5–1.4)
EOSINOPHIL # BLD AUTO: 0 K/UL (ref 0–0.51)
EOSINOPHIL NFR BLD: 0 % (ref 0–6.9)
ERYTHROCYTE [DISTWIDTH] IN BLOOD BY AUTOMATED COUNT: 44 FL (ref 35.9–50)
FERRITIN SERPL-MCNC: 106 NG/ML (ref 22–322)
GFR SERPLBLD CREATININE-BSD FMLA CKD-EPI: 13 ML/MIN/1.73 M 2
GFR SERPLBLD CREATININE-BSD FMLA CKD-EPI: 14 ML/MIN/1.73 M 2
GLOBULIN SER CALC-MCNC: 2.6 G/DL (ref 1.9–3.5)
GLUCOSE SERPL-MCNC: 116 MG/DL (ref 65–99)
GLUCOSE SERPL-MCNC: 134 MG/DL (ref 65–99)
HCT VFR BLD AUTO: 36.1 % (ref 42–52)
HGB BLD-MCNC: 11.8 G/DL (ref 14–18)
HGB BLD-MCNC: 12.1 G/DL (ref 14–18)
IMM GRANULOCYTES # BLD AUTO: 0.01 K/UL (ref 0–0.11)
IMM GRANULOCYTES NFR BLD AUTO: 0.2 % (ref 0–0.9)
IRON SATN MFR SERPL: 36 % (ref 15–55)
IRON SERPL-MCNC: 72 UG/DL (ref 50–180)
LYMPHOCYTES # BLD AUTO: 1.4 K/UL (ref 1–4.8)
LYMPHOCYTES NFR BLD: 21.7 % (ref 22–41)
MCH RBC QN AUTO: 30.1 PG (ref 27–33)
MCHC RBC AUTO-ENTMCNC: 32.7 G/DL (ref 32.3–36.5)
MCV RBC AUTO: 92.1 FL (ref 81.4–97.8)
MONOCYTES # BLD AUTO: 0.49 K/UL (ref 0–0.85)
MONOCYTES NFR BLD AUTO: 7.6 % (ref 0–13.4)
NEUTROPHILS # BLD AUTO: 4.52 K/UL (ref 1.82–7.42)
NEUTROPHILS NFR BLD: 70.2 % (ref 44–72)
NRBC # BLD AUTO: 0 K/UL
NRBC BLD-RTO: 0 /100 WBC (ref 0–0.2)
PHOSPHATE SERPL-MCNC: 3.5 MG/DL (ref 2.5–4.5)
PLATELET # BLD AUTO: 163 K/UL (ref 164–446)
PMV BLD AUTO: 10.1 FL (ref 9–12.9)
POTASSIUM SERPL-SCNC: 4.3 MMOL/L (ref 3.6–5.5)
POTASSIUM SERPL-SCNC: 6.1 MMOL/L (ref 3.6–5.5)
PROT SERPL-MCNC: 6.5 G/DL (ref 6–8.2)
RBC # BLD AUTO: 3.92 M/UL (ref 4.7–6.1)
SODIUM SERPL-SCNC: 136 MMOL/L (ref 135–145)
SODIUM SERPL-SCNC: 137 MMOL/L (ref 135–145)
TIBC SERPL-MCNC: 200 UG/DL (ref 250–450)
UIBC SERPL-MCNC: 128 UG/DL (ref 110–370)
WBC # BLD AUTO: 6.4 K/UL (ref 4.8–10.8)

## 2023-12-14 PROCEDURE — 99232 SBSQ HOSP IP/OBS MODERATE 35: CPT | Performed by: INTERNAL MEDICINE

## 2023-12-14 PROCEDURE — 82728 ASSAY OF FERRITIN: CPT

## 2023-12-14 PROCEDURE — 85025 COMPLETE CBC W/AUTO DIFF WBC: CPT

## 2023-12-14 PROCEDURE — 86480 TB TEST CELL IMMUN MEASURE: CPT

## 2023-12-14 PROCEDURE — 700105 HCHG RX REV CODE 258: Performed by: INTERNAL MEDICINE

## 2023-12-14 PROCEDURE — 36415 COLL VENOUS BLD VENIPUNCTURE: CPT

## 2023-12-14 PROCEDURE — 80069 RENAL FUNCTION PANEL: CPT

## 2023-12-14 PROCEDURE — A9270 NON-COVERED ITEM OR SERVICE: HCPCS | Performed by: INTERNAL MEDICINE

## 2023-12-14 PROCEDURE — 700102 HCHG RX REV CODE 250 W/ 637 OVERRIDE(OP): Performed by: INTERNAL MEDICINE

## 2023-12-14 PROCEDURE — 80053 COMPREHEN METABOLIC PANEL: CPT

## 2023-12-14 PROCEDURE — 85018 HEMOGLOBIN: CPT

## 2023-12-14 PROCEDURE — 770020 HCHG ROOM/CARE - TELE (206)

## 2023-12-14 PROCEDURE — 700111 HCHG RX REV CODE 636 W/ 250 OVERRIDE (IP): Mod: JZ | Performed by: INTERNAL MEDICINE

## 2023-12-14 PROCEDURE — 700111 HCHG RX REV CODE 636 W/ 250 OVERRIDE (IP): Performed by: INTERNAL MEDICINE

## 2023-12-14 PROCEDURE — 83550 IRON BINDING TEST: CPT

## 2023-12-14 PROCEDURE — 700111 HCHG RX REV CODE 636 W/ 250 OVERRIDE (IP): Mod: JZ

## 2023-12-14 PROCEDURE — 83540 ASSAY OF IRON: CPT

## 2023-12-14 RX ORDER — FUROSEMIDE 10 MG/ML
100 INJECTION INTRAMUSCULAR; INTRAVENOUS ONCE
Status: COMPLETED | OUTPATIENT
Start: 2023-12-14 | End: 2023-12-14

## 2023-12-14 RX ORDER — FUROSEMIDE 10 MG/ML
40 INJECTION INTRAMUSCULAR; INTRAVENOUS ONCE
Status: COMPLETED | OUTPATIENT
Start: 2023-12-14 | End: 2023-12-14

## 2023-12-14 RX ADMIN — FUROSEMIDE 100 MG: 10 INJECTION, SOLUTION INTRAVENOUS at 13:42

## 2023-12-14 RX ADMIN — HEPARIN SODIUM 5000 UNITS: 5000 INJECTION, SOLUTION INTRAVENOUS; SUBCUTANEOUS at 21:09

## 2023-12-14 RX ADMIN — HEPARIN SODIUM 5000 UNITS: 5000 INJECTION, SOLUTION INTRAVENOUS; SUBCUTANEOUS at 05:21

## 2023-12-14 RX ADMIN — FUROSEMIDE 40 MG: 10 INJECTION, SOLUTION INTRAMUSCULAR; INTRAVENOUS at 03:50

## 2023-12-14 RX ADMIN — SODIUM BICARBONATE 650 MG: 650 TABLET ORAL at 13:43

## 2023-12-14 RX ADMIN — QUETIAPINE FUMARATE 25 MG: 25 TABLET ORAL at 21:09

## 2023-12-14 RX ADMIN — ESCITALOPRAM OXALATE 10 MG: 10 TABLET ORAL at 05:22

## 2023-12-14 RX ADMIN — CARBAMAZEPINE 200 MG: 200 TABLET ORAL at 16:07

## 2023-12-14 RX ADMIN — TAMSULOSIN HYDROCHLORIDE 0.4 MG: 0.4 CAPSULE ORAL at 08:58

## 2023-12-14 RX ADMIN — SODIUM BICARBONATE: 84 INJECTION, SOLUTION INTRAVENOUS at 06:02

## 2023-12-14 RX ADMIN — AMLODIPINE BESYLATE 10 MG: 10 TABLET ORAL at 05:21

## 2023-12-14 RX ADMIN — CARBAMAZEPINE 200 MG: 200 TABLET ORAL at 05:21

## 2023-12-14 RX ADMIN — OMEPRAZOLE 20 MG: 20 CAPSULE, DELAYED RELEASE ORAL at 05:22

## 2023-12-14 RX ADMIN — SODIUM BICARBONATE: 84 INJECTION, SOLUTION INTRAVENOUS at 17:00

## 2023-12-14 RX ADMIN — HEPARIN SODIUM 5000 UNITS: 5000 INJECTION, SOLUTION INTRAVENOUS; SUBCUTANEOUS at 13:42

## 2023-12-14 RX ADMIN — SODIUM BICARBONATE 650 MG: 650 TABLET ORAL at 21:09

## 2023-12-14 RX ADMIN — SODIUM BICARBONATE 650 MG: 650 TABLET ORAL at 08:58

## 2023-12-14 RX ADMIN — ATORVASTATIN CALCIUM 40 MG: 40 TABLET, FILM COATED ORAL at 21:09

## 2023-12-14 ASSESSMENT — ENCOUNTER SYMPTOMS
NAUSEA: 0
CHILLS: 0
WEAKNESS: 1
COUGH: 0
ABDOMINAL PAIN: 0
FEVER: 0
VOMITING: 0
SHORTNESS OF BREATH: 0

## 2023-12-14 ASSESSMENT — PAIN DESCRIPTION - PAIN TYPE: TYPE: ACUTE PAIN

## 2023-12-14 ASSESSMENT — FIBROSIS 4 INDEX: FIB4 SCORE: 0.86

## 2023-12-14 NOTE — ED NOTES
Med rec completed per patient and patient's wife   Allergies reviewed with patient  Home antibiotics in past 30 days: none   Anticoagulants/antiplatelets in past 14 days: none  Preferred pharmacy: [facility]  Reports NOT taking eplerenone   Uncertain about calcitriol. Dispensed x90 09/27   Natacha Newsome, Pharmacy Intern

## 2023-12-14 NOTE — CARE PLAN
Problem: Knowledge Deficit - Standard  Goal: Patient and family/care givers will demonstrate understanding of plan of care, disease process/condition, diagnostic tests and medications  Description: Target End Date:  1-3 days or as soon as patient condition allows    Document in Patient Education    1.  Patient and family/caregiver oriented to unit, equipment, visitation policy and means for communicating concern  2.  Complete/review Learning Assessment  3.  Assess knowledge level of disease process/condition, treatment plan, diagnostic tests and medications  4.  Explain disease process/condition, treatment plan, diagnostic tests and medications  Outcome: Progressing     Problem: Hemodynamics  Goal: Patient's hemodynamics, fluid balance and neurologic status will be stable or improve  Description: Target End Date:  Prior to discharge or change in level of care    Document on Assessment and I/O flowsheet templates    1.  Monitor vital signs, pulse oximetry and cardiac monitor per provider order and/or policy  2.  Maintain blood pressure per provider order  3.  Hemodynamic monitoring per provider order  4.  Manage IV fluids and IV infusions  5.  Monitor intake and output  6.  Daily weights per unit policy or provider order  7.  Assess peripheral pulses and capillary refill  8.  Assess color and body temperature  9.  Position patient for maximum circulation/cardiac output  10. Monitor for signs/symptoms of excessive bleeding  11. Assess mental status, restlessness and changes in level of consciousness  12. Monitor temperature and report fever or hypothermia to provider immediately. Consideration of targeted temperature management.  Outcome: Progressing   The patient is Watcher - Medium risk of patient condition declining or worsening    Shift Goals  Clinical Goals: monitor SHANA, monitor K  Patient Goals: rest  Family Goals: KIMBERLY    Progress made toward(s) clinical / shift goals:  Pt and wife updated on POC. All  questions answered.    Patient is not progressing towards the following goals:

## 2023-12-14 NOTE — PROGRESS NOTES
Bedside report received from night shift RN. Assumed care. Pt is A&O x 4, Pt is in bed resting. Pt denies pain at this time. Pt was updated on plan of care. Pt has call light, personal belongings, and bedside table within reach. Bed is in the lowest position and bed alarm is on. Will continue to monitor.

## 2023-12-14 NOTE — CONSULTS
DATE OF SERVICE:  12/13/2023     REQUESTING PHYSICIAN:  Radha Preciado MD from the emergency room service.     REASON FOR CONSULTATION:  Acute kidney injury on chronic kidney disease stage   IV and hyperkalemia.     The patient seen and examined, medical record reviewed.     HISTORY OF PRESENT ILLNESS:  The patient is a very pleasant 67-year-old   gentleman with past medical history significant for chronic kidney disease   stage IV, history of longstanding hypertension, who presented to an outside   hospital because he has not felt well for the last 3 days.  Apparently on   Monday, he went skiing and he started feeling very weak with a near syncopal   episode. In the outside facility, the patient was found to be in acute kidney   injury with severe hyperkalemia, initial potassium at 8.  The patient was   treated with IV fluid, insulin/glucose and transferred to [facility] and here repeat labs showed potassium better, but still at 6.6 and   creatinine at 4.2.     The patient does report bladder outlet obstruction symptoms including nocturia   and increased urine frequency.     The patient has no recent use of NSAIDs or IV contrast exposure.     PAST MEDICAL HISTORY:  Significant for:  1.  Hypertension.  2.  Chronic kidney disease stage IV.     ALLERGIES:  No known drug allergies.     SOCIAL HISTORY:  The patient is a retired .     FAMILY HISTORY:  No known renal disease.     MEDICATIONS:  Reviewed.     REVIEW OF SYSTEMS:  All systems were reviewed. They were negative except   outlined in the history of present illness.     PHYSICAL EXAMINATION:  GENERAL:  The patient appears ill, but no apparent distress.  VITAL SIGNS:  Blood pressure 142/73, heart rate was 89, respiratory rate was   18.  HEENT:  Normocephalic, atraumatic.  Sclerae are anicteric.  Pupils are   reactive.  Nose normal.  Mucous membranes moist.  NECK:  No lymphadenopathy, no JVD, no thyroid mass.  CHEST:  Normal.  LUNGS:   Clear to auscultation.  HEART:  S1, S2.  ABDOMEN:  Soft, nontender.  No hepatosplenomegaly.  There is no inguinal   lymphadenopathy.  EXTREMITIES:  There is no lower extremity edema.  SKIN:  No skin rash.  NEUROLOGIC:  The patient is alert and oriented x3.     LABORATORY DATA:  His recent labs from today were reviewed.     DIAGNOSTIC DATA:  The patient also had a renal ultrasound, which I reviewed   the image myself, showed no hydronephrosis however, there is some mild urinary   retention.     ASSESSMENT:  1.  Acute kidney injury on chronic kidney disease stage IV.  The etiology is   not very clear, possibly secondary to obstructive uropathy.  2.  Hyperkalemia.  The etiology is a combination of acute kidney injury and   potassium supplement.  3.  Metabolic acidosis.  4.  Anemia.  5.  Generalized weakness.     PLAN:  1.  There is no acute need for renal replacement therapy.  2.  Continue fluid resuscitation.  3.  Low potassium and angiotensin receptor blocker.  4.  Daily labs.  5.  Start tamsulosin.  6.  Renal dose all medications.  7.  Avoid nephrotoxin.  8.  Daily labs.  9.   If there is no improvement in the kidney function or potassium level in   the next 24-48 hours, we will consider dialysis.     Plan discussed in detail with Dr. Preciado.        ______________________________  FADI NAJJAR, MD FN/TANJA    DD:  12/13/2023 17:16  DT:  12/13/2023 19:50    Job#:  [id number]

## 2023-12-14 NOTE — PROGRESS NOTES
4 Eyes Skin Assessment Completed by KALA Floyd and FRANCK Randall.    Head WDL  Ears WDL  Nose WDL  Mouth WDL  Neck WDL  Breast/Chest WDL  Shoulder Blades WDL  Spine WDL  (R) Arm/Elbow/Hand Redness and Blanching boggy  (L) Arm/Elbow/Hand Redness and Blanching boggy  Abdomen WDL  Groin WDL  Scrotum/Coccyx/Buttocks Redness and Blanching  (R) Leg WDL  (L) Leg WDL  (R) Heel/Foot/Toe Redness and Blanching, dry, calloused, scab to 4th toe  (L) Heel/Foot/Toe Redness and Blanching, dry, calloused          Devices In Places Tele Box      Interventions In Place Pillows, Heels Loaded W/Pillows, and Pressure Redistribution Mattress    Possible Skin Injury No    Pictures Uploaded Into Epic N/A  Wound Consult Placed N/A  RN Wound Prevention Protocol Ordered No

## 2023-12-14 NOTE — H&P
Hospital Medicine History & Physical Note    Date of Service  12/13/2023    Primary Care Physician  Anna Barrera P.A.-C.    Consultants  Nephrology, Dr. Najjar    Code Status  Full Code    Chief Complaint  Chief Complaint   Patient presents with    Weakness     Pt transferred from [facility] after presenting this morning for generalized weakness that started on Monday. Pt A+Ox4.       History of Presenting Illness  Roby Viramontes II is a 67 y.o. male with past medical history of CKD stage IV, hypertension, dyslipidemia, seizure, ADHD, GERD, who presented 12/13/2023 as a transfer from [facility] with concern for hyperkalemia 8.  He presented to outside facility ER complaining of generalized weakness for 4 to 5 days, nausea, one-time vomiting, frequent urination.  Blood work showed worsening of kidney function and hyperkalemia.  He has been on the same medications for a long time, including K-Phos, losartan, eplerenone    Treatment at outside facility included calcium gluconate, insulin/dextrose, bicarb, albuterol nebulizer    Repeat potassium here is 6.6.  He received repeat treatment with dextrose/insulin, saline 1 L  Nephrology consulted.  No immediate plan for hemodialysis.    I discussed the plan of care with patient and ERP .    Review of Systems  Review of Systems   Constitutional:  Negative for chills, fever and weight loss.   HENT:  Negative for ear pain, hearing loss and tinnitus.    Eyes:  Negative for blurred vision, double vision and photophobia.   Respiratory:  Negative for cough, hemoptysis and sputum production.    Cardiovascular:  Negative for chest pain, palpitations and orthopnea.   Gastrointestinal:  Positive for nausea and vomiting. Negative for abdominal pain and heartburn.   Genitourinary:  Negative for dysuria, flank pain, frequency and hematuria.   Musculoskeletal:  Negative for back pain, joint pain and neck pain.   Skin:  Negative for itching and rash.   Neurological:   Positive for weakness. Negative for tremors, speech change, focal weakness and headaches.   Endo/Heme/Allergies:  Negative for environmental allergies and polydipsia. Does not bruise/bleed easily.   Psychiatric/Behavioral:  Negative for hallucinations and substance abuse. The patient is not nervous/anxious.        Past Medical History   has a past medical history of CKD (chronic kidney disease) stage 4, GFR 15-29 ml/min ([facility]) (6/22/2021), Dyslipidemia, Hypertension, Hypophosphatemia, Psychiatric problem, and Seizure disorder ([facility]).    Surgical History   has a past surgical history that includes gastroscopy-endo (7/10/2020).     Family History  family history is not on file.   Family history reviewed with patient. There is no family history that is pertinent to the chief complaint.     Social History   reports that he quit smoking about 33 years ago. His smoking use included cigarettes. He has never used smokeless tobacco. He reports that he does not currently use alcohol. He reports current drug use. Drugs: Marijuana and Inhaled.    Allergies  No Known Allergies    Medications  Prior to Admission Medications   Prescriptions Last Dose Informant Patient Reported? Taking?   Eplerenone 50 MG Tab   Yes No   Sig: Take 50 mg by mouth every day.   QUEtiapine (SEROQUEL) 25 MG Tab   Yes No   Sig: Take 25 mg by mouth every evening.   amLODIPine (NORVASC) 10 MG Tab   Yes No   Sig: Take 10 mg by mouth every day.   atorvastatin (LIPITOR) 40 MG Tab  Rx Bottle (For Med Information) Yes No   Sig: Take 40 mg by mouth every evening.   carBAMazepine (TEGRETOL) 200 MG Tab   Yes No   Sig: Take 200 mg by mouth 2 times a day.   escitalopram (LEXAPRO) 10 MG Tab   Yes No   Sig: Take 10 mg by mouth every day.   losartan (COZAAR) 50 MG Tab   Yes No   Sig: Take 50 mg by mouth every day.   omeprazole (PRILOSEC) 20 MG delayed-release capsule   Yes No   Sig: Take 20 mg by mouth every day.   phosphorus (K-PHOS-NEUTRAL) 250 MG tablet   No No   Sig:  "Take 2 Tablets by mouth 2 times a day.      Facility-Administered Medications: None       Physical Exam  Temp:  [36.3 °C (97.4 °F)] 36.3 °C (97.4 °F)  Pulse:  [89] 89  Resp:  [18] 18  BP: (142)/(73) 142/73  SpO2:  [96 %] 96 %  Blood Pressure : (!) 142/73   Temperature: 36.3 °C (97.4 °F)   Pulse: 89   Respiration: 18   Pulse Oximetry: 96 %       Physical Exam  Vitals and nursing note reviewed.   Constitutional:       General: He is not in acute distress.     Appearance: Normal appearance.   HENT:      Head: Normocephalic and atraumatic.      Nose: Nose normal.      Mouth/Throat:      Mouth: Mucous membranes are moist.   Eyes:      Extraocular Movements: Extraocular movements intact.      Pupils: Pupils are equal, round, and reactive to light.   Cardiovascular:      Rate and Rhythm: Normal rate and regular rhythm.   Pulmonary:      Effort: Pulmonary effort is normal.      Breath sounds: Normal breath sounds.   Abdominal:      General: Abdomen is flat. There is no distension.      Tenderness: There is no abdominal tenderness.   Musculoskeletal:         General: No swelling or deformity. Normal range of motion.      Cervical back: Normal range of motion and neck supple.   Skin:     General: Skin is warm and dry.   Neurological:      General: No focal deficit present.      Mental Status: He is alert and oriented to person, place, and time.   Psychiatric:         Mood and Affect: Mood normal.         Behavior: Behavior normal.         Laboratory:  Recent Labs     12/13/23  1439   WBC 10.3   RBC 4.52*   HEMOGLOBIN 13.7*   HEMATOCRIT 43.4   MCV 96.0   MCH 30.3   MCHC 31.6*   RDW 45.5   PLATELETCT 206   MPV 10.1     Recent Labs     12/13/23  1439   SODIUM 138   POTASSIUM 6.6*   CHLORIDE 108   CO2 17*   GLUCOSE 128*   BUN 30*   CREATININE 4.21*   CALCIUM 10.5     Recent Labs     12/13/23  1439   ALTSGPT 11   ASTSGOT 7*   ALKPHOSPHAT 172*   TBILIRUBIN 0.3   GLUCOSE 128*         No results for input(s): \"NTPROBNP\" in the last " "72 hours.      No results for input(s): \"TROPONINT\" in the last 72 hours.    Imaging:  US-RENAL   Final Result      1.  Features of medical renal disease.   2.  No hydronephrosis.          Assessment/Plan:  Justification for Admission Status  I anticipate this patient will require at least two midnights for appropriate medical management, necessitating inpatient admission because hyperkalemia    Patient will need a Telemetry bed on MEDICAL service .  The need is secondary to hyperkalemia.    * Hyperkalemia- (present on admission)  Assessment & Plan  Continue to worsening of kidney function and medications losartan, eplerenone, potassium supplementation  Improved from 8-6.6 with treatment  Continue IV bicarb, switch to p.o. bicarb tomorrow  Monitor on telemetry.  Currently no concern for EKG changes    SHANA on CKD stage III  Assessment & Plan  Question of progression to CKD stage V requiring hemodialysis now, given acidosis and hyperkalemia  Management per nephrology  Ultrasound negative for obstruction  Bladder scan every 4 hours as he complains of  LUTS, consider starting tamsulosin    Metabolic acidosis- (present on admission)  Assessment & Plan  Due to renal failure  Plan: IV bicarb followed by p.o. bicarb    ADHD (attention deficit hyperactivity disorder), combined type- (present on admission)  Assessment & Plan  Continue Lexapro, Seroquel    Seizure (HCC)- (present on admission)  Assessment & Plan  Continue carbamazepine    Hypertension- (present on admission)  Assessment & Plan  Hold losartan and eplerenone  Continue amlodipine  Hydralazine as needed        VTE prophylaxis: heparin ppx  "

## 2023-12-14 NOTE — PROGRESS NOTES
On call hospitalist notified of potassium lab of 6.1 and a near 2 point drop of hgb from 13.7 to 11.8.

## 2023-12-14 NOTE — CARE PLAN
The patient is Stable - Low risk of patient condition declining or worsening    Shift Goals  Clinical Goals: Decrease K+, improve kidney function  Patient Goals: Feel better    Progress made toward(s) clinical / shift goals: Pt's K+ trending down, next check in AM, infusing sodium bicarb      Problem: Knowledge Deficit - Standard  Goal: Patient and family/care givers will demonstrate understanding of plan of care, disease process/condition, diagnostic tests and medications  Outcome: Progressing     Problem: Hemodynamics  Goal: Patient's hemodynamics, fluid balance and neurologic status will be stable or improve  Outcome: Progressing     Problem: Fluid Volume  Goal: Fluid volume balance will be maintained  Outcome: Progressing     Problem: Urinary Elimination  Goal: Establish and maintain regular urinary output  Outcome: Progressing       Patient is not progressing towards the following goals:

## 2023-12-14 NOTE — PROGRESS NOTES
[facility] Medicine Daily Progress Note    Date of Service  12/14/2023    Chief Complaint  Roby Viramontes II is a 68 y.o. male admitted 12/13/2023 with weakness    Hospital Course  69 yo man with CKD IV, HTN, HLD, seizure who presented with [facility] with weakness and found with hyperkalemia of 8 and transferred to [facility]. Nephrology was consulted.    Interval Problem Update  K remains 6.1, creat about the same. He still feels weak. I updated his wife.     I have discussed this patient's plan of care and discharge plan at IDT rounds today with Case Management, Nursing, Nursing leadership, and other members of the IDT team.    Consultants/Specialty  nephrology    Code Status  Full Code    Disposition  The patient is not medically cleared for discharge to home or a post-acute facility.  Anticipate discharge to: home with close outpatient follow-up    I have placed the appropriate orders for post-discharge needs.    Review of Systems  Review of Systems   Constitutional:  Positive for malaise/fatigue.   Respiratory:  Negative for shortness of breath.    Cardiovascular:  Negative for chest pain.   Gastrointestinal:  Negative for abdominal pain and vomiting.   Genitourinary:  Negative for dysuria.   Neurological:  Positive for weakness.        Physical Exam  Temp:  [36.3 °C (97.4 °F)-37 °C (98.6 °F)] 36.8 °C (98.2 °F)  Pulse:  [68-89] 84  Resp:  [12-18] 17  BP: (100-145)/(56-80) 116/67  SpO2:  [93 %-97 %] 94 %    Physical Exam  Vitals and nursing note reviewed.   Constitutional:       General: He is not in acute distress.     Appearance: He is not toxic-appearing.   HENT:      Head: Normocephalic.      Mouth/Throat:      Mouth: Mucous membranes are moist.   Eyes:      General:         Right eye: No discharge.         Left eye: No discharge.   Cardiovascular:      Rate and Rhythm: Normal rate and regular rhythm.   Pulmonary:      Effort: Pulmonary effort is normal. No respiratory distress.      Breath sounds: No  wheezing or rales.   Abdominal:      Palpations: Abdomen is soft.      Tenderness: There is no abdominal tenderness.   Musculoskeletal:         General: No swelling.      Cervical back: Neck supple.   Skin:     General: Skin is warm and dry.   Neurological:      Mental Status: He is alert and oriented to person, place, and time.         Fluids    Intake/Output Summary (Last 24 hours) at 12/14/2023 1230  Last data filed at 12/14/2023 1107  Gross per 24 hour   Intake 840 ml   Output --   Net 840 ml       Laboratory  Recent Labs     12/13/23  1439 12/14/23  0129 12/14/23  0927   WBC 10.3 6.4  --    RBC 4.52* 3.92*  --    HEMOGLOBIN 13.7* 11.8* 12.1*   HEMATOCRIT 43.4 36.1*  --    MCV 96.0 92.1  --    MCH 30.3 30.1  --    MCHC 31.6* 32.7  --    RDW 45.5 44.0  --    PLATELETCT 206 163*  --    MPV 10.1 10.1  --      Recent Labs     12/13/23  1439 12/14/23  0129   SODIUM 138 137   POTASSIUM 6.6* 6.1*   CHLORIDE 108 106   CO2 17* 19*   GLUCOSE 128* 116*   BUN 30* 31*   CREATININE 4.21* 4.43*   CALCIUM 10.5 8.8                   Imaging  US-RENAL   Final Result      1.  Features of medical renal disease.   2.  No hydronephrosis.           Assessment/Plan  * Hyperkalemia- (present on admission)  Assessment & Plan  Continue to worsening of kidney function and medications losartan, eplerenone, potassium supplementation  K remains 6.1, was given lasix overnight, recheck level  Nephrology following    SHANA on CKD stage III  Assessment & Plan  Question of progression to CKD stage V requiring hemodialysis now, given acidosis and hyperkalemia  Management per nephrology. Continue bicarb infusion, follow up recs  Ultrasound negative for obstruction    Metabolic acidosis- (present on admission)  Assessment & Plan  Due to renal failure  Continue bicarb infusion    ADHD (attention deficit hyperactivity disorder), combined type- (present on admission)  Assessment & Plan  Continue Lexapro, Seroquel    Seizure (HCC)- (present on  admission)  Assessment & Plan  Continue carbamazepine    Hypertension- (present on admission)  Assessment & Plan  Hold losartan and eplerenone  Continue amlodipine  BP is stable  Hydralazine as needed         VTE prophylaxis:    heparin ppx      I have performed a physical exam and reviewed and updated ROS and Plan today (12/14/2023). In review of yesterday's note (12/13/2023), there are no changes except as documented above.

## 2023-12-14 NOTE — PROGRESS NOTES
Nephrology Daily Progress Note    Date of Service  12/14/2023    Chief Complaint  68 y.o. male admitted 12/13/2023 with weakness, acute kidney injury and hyperkalemia    Interval Problem Update  Patient feels little bit better, still fatigue, decreased p.o. intake.    Review of Systems  Review of Systems   Constitutional:  Positive for malaise/fatigue. Negative for chills and fever.   Respiratory:  Negative for cough and shortness of breath.    Cardiovascular:  Negative for chest pain and leg swelling.   Gastrointestinal:  Negative for nausea and vomiting.   Genitourinary:  Negative for dysuria, frequency and urgency.        Physical Exam  Temp:  [36.8 °C (98.2 °F)-37 °C (98.6 °F)] 36.8 °C (98.2 °F)  Pulse:  [68-84] 84  Resp:  [14-17] 17  BP: (100-145)/(56-80) 123/71  SpO2:  [93 %-97 %] 94 %    Physical Exam  Vitals and nursing note reviewed.   Constitutional:       General: He is awake.      Appearance: He is not ill-appearing.   HENT:      Head: Normocephalic and atraumatic.      Right Ear: External ear normal.      Left Ear: External ear normal.      Nose: Nose normal.      Mouth/Throat:      Pharynx: No oropharyngeal exudate or posterior oropharyngeal erythema.   Eyes:      General:         Right eye: No discharge.         Left eye: No discharge.      Conjunctiva/sclera: Conjunctivae normal.   Cardiovascular:      Rate and Rhythm: Normal rate and regular rhythm.   Pulmonary:      Effort: Pulmonary effort is normal. No respiratory distress.      Breath sounds: Normal breath sounds. No wheezing.   Abdominal:      General: Abdomen is flat. Bowel sounds are normal.   Musculoskeletal:         General: No tenderness.      Cervical back: No rigidity. No muscular tenderness.      Right lower leg: No edema.      Left lower leg: No edema.   Skin:     General: Skin is warm and dry.      Coloration: Skin is not jaundiced.      Findings: No lesion or rash.   Neurological:      General: No focal deficit present.      Mental  "Status: He is alert and oriented to person, place, and time. Mental status is at baseline.   Psychiatric:         Mood and Affect: Mood normal.         Behavior: Behavior normal.         Thought Content: Thought content normal.         Fluids    Intake/Output Summary (Last 24 hours) at 12/14/2023 1534  Last data filed at 12/14/2023 1300  Gross per 24 hour   Intake 840 ml   Output 400 ml   Net 440 ml       Laboratory  Recent Labs     12/13/23  1439 12/14/23  0129 12/14/23  0927   WBC 10.3 6.4  --    RBC 4.52* 3.92*  --    HEMOGLOBIN 13.7* 11.8* 12.1*   HEMATOCRIT 43.4 36.1*  --    MCV 96.0 92.1  --    MCH 30.3 30.1  --    MCHC 31.6* 32.7  --    RDW 45.5 44.0  --    PLATELETCT 206 163*  --    MPV 10.1 10.1  --      Recent Labs     12/13/23  1439 12/14/23  0129   SODIUM 138 137   POTASSIUM 6.6* 6.1*   CHLORIDE 108 106   CO2 17* 19*   GLUCOSE 128* 116*   BUN 30* 31*   CREATININE 4.21* 4.43*   CALCIUM 10.5 8.8         No results for input(s): \"NTPROBNP\" in the last 72 hours.        Imaging  US-RENAL   Final Result      1.  Features of medical renal disease.   2.  No hydronephrosis.            Assessment/Plan  1 SHANA on CKD stage IV: Possible progression of kidney disease  2 hyperkalemia  3 metabolic acidosis  4 hypertension  no acute need for HD today however I discussed with the patient and his wife that it might be a possibility in the near future if no significant improvement in the renal function  Renal diet  Daily BMP, CBC.  Try high-dose Lasix to measure hyperkalemia  Start sodium bicarbonate  Renal dose all meds  Avoid nephrotoxins like NSAIDs.  Prognosis guarded.            "

## 2023-12-14 NOTE — PROGRESS NOTES
Bedside report received from day RN, pt care assumed, assessment completed. Pt is A&Ox4, pain 0/10, SR on the monitor. Updated on POC, questions answered. Bed in lowest, locked position, treaded socks on, call light and belongings within reach.

## 2023-12-14 NOTE — CARE PLAN
The patient is Stable - Low risk of patient condition declining or worsening    Shift Goals  Clinical Goals: Decrease K, labs, monitor kidney function  Patient Goals: Rest  Family Goals: KIMBERLY    Progress made toward(s) clinical / shift goals:    Problem: Knowledge Deficit - Standard  Goal: Patient and family/care givers will demonstrate understanding of plan of care, disease process/condition, diagnostic tests and medications  Description: Target End Date:  1-3 days or as soon as patient condition allows    Document in Patient Education    1.  Patient and family/caregiver oriented to unit, equipment, visitation policy and means for communicating concern  2.  Complete/review Learning Assessment  3.  Assess knowledge level of disease process/condition, treatment plan, diagnostic tests and medications  4.  Explain disease process/condition, treatment plan, diagnostic tests and medications  12/13/2023 2258 by Trisha Powers, Student  Outcome: Progressing  Note: Pt updated on POC, all questions answered at this time.     Problem: Hemodynamics  Goal: Patient's hemodynamics, fluid balance and neurologic status will be stable or improve  Description: Target End Date:  Prior to discharge or change in level of care    Document on Assessment and I/O flowsheet templates    1.  Monitor vital signs, pulse oximetry and cardiac monitor per provider order and/or policy  2.  Maintain blood pressure per provider order  3.  Hemodynamic monitoring per provider order  4.  Manage IV fluids and IV infusions  5.  Monitor intake and output  6.  Daily weights per unit policy or provider order  7.  Assess peripheral pulses and capillary refill  8.  Assess color and body temperature  9.  Position patient for maximum circulation/cardiac output  10. Monitor for signs/symptoms of excessive bleeding  11. Assess mental status, restlessness and changes in level of consciousness  12. Monitor temperature and report fever or hypothermia to  provider immediately. Consideration of targeted temperature management.  12/13/2023 9968 by Trisha Powers, Student  Outcome: Progressing  Note: Pt VS stable, labs being monitored. Is and Os continuously being monitored.

## 2023-12-14 NOTE — ASSESSMENT & PLAN NOTE
Question of progression to CKD stage V requiring hemodialysis now, given acidosis and hyperkalemia  Management per nephrology. Continue bicarb infusion, follow up recs regarding whether he needs dialysis  Ultrasound negative for obstruction

## 2023-12-14 NOTE — PROGRESS NOTES
Pt arrived to unit via [facility] with this RN on zoll monitor. Pt oriented to room, unit, and plan of care. Tele-monitor placed. All questions answered at this time. Call light within reach, fall precautions in place, will continue to monitor.

## 2023-12-15 ENCOUNTER — PHARMACY VISIT (OUTPATIENT)
Dept: PHARMACY | Facility: MEDICAL CENTER | Age: 68
End: 2023-12-15
Payer: COMMERCIAL

## 2023-12-15 VITALS
BODY MASS INDEX: 23.77 KG/M2 | TEMPERATURE: 98.6 F | RESPIRATION RATE: 18 BRPM | HEIGHT: 72 IN | HEART RATE: 72 BPM | WEIGHT: 175.49 LBS | DIASTOLIC BLOOD PRESSURE: 68 MMHG | SYSTOLIC BLOOD PRESSURE: 117 MMHG | OXYGEN SATURATION: 93 %

## 2023-12-15 LAB
ALBUMIN SERPL BCP-MCNC: 3.9 G/DL (ref 3.2–4.9)
BASOPHILS # BLD AUTO: 0.4 % (ref 0–1.8)
BASOPHILS # BLD: 0.03 K/UL (ref 0–0.12)
BUN SERPL-MCNC: 31 MG/DL (ref 8–22)
CALCIUM ALBUM COR SERPL-MCNC: 7.8 MG/DL (ref 8.5–10.5)
CALCIUM SERPL-MCNC: 7.7 MG/DL (ref 8.5–10.5)
CHLORIDE SERPL-SCNC: 95 MMOL/L (ref 96–112)
CO2 SERPL-SCNC: 30 MMOL/L (ref 20–33)
CREAT SERPL-MCNC: 4.57 MG/DL (ref 0.5–1.4)
EOSINOPHIL # BLD AUTO: 0.19 K/UL (ref 0–0.51)
EOSINOPHIL NFR BLD: 2.5 % (ref 0–6.9)
ERYTHROCYTE [DISTWIDTH] IN BLOOD BY AUTOMATED COUNT: 41.7 FL (ref 35.9–50)
GAMMA INTERFERON BACKGROUND BLD IA-ACNC: 0.06 IU/ML
GFR SERPLBLD CREATININE-BSD FMLA CKD-EPI: 13 ML/MIN/1.73 M 2
GLUCOSE SERPL-MCNC: 108 MG/DL (ref 65–99)
HCT VFR BLD AUTO: 34.6 % (ref 42–52)
HGB BLD-MCNC: 12 G/DL (ref 14–18)
IMM GRANULOCYTES # BLD AUTO: 0.03 K/UL (ref 0–0.11)
IMM GRANULOCYTES NFR BLD AUTO: 0.4 % (ref 0–0.9)
LYMPHOCYTES # BLD AUTO: 2.11 K/UL (ref 1–4.8)
LYMPHOCYTES NFR BLD: 28.1 % (ref 22–41)
M TB IFN-G BLD-IMP: NEGATIVE
M TB IFN-G CD4+ BCKGRND COR BLD-ACNC: 0 IU/ML
MCH RBC QN AUTO: 30.8 PG (ref 27–33)
MCHC RBC AUTO-ENTMCNC: 34.7 G/DL (ref 32.3–36.5)
MCV RBC AUTO: 88.7 FL (ref 81.4–97.8)
MITOGEN IGNF BCKGRD COR BLD-ACNC: >10 IU/ML
MONOCYTES # BLD AUTO: 0.56 K/UL (ref 0–0.85)
MONOCYTES NFR BLD AUTO: 7.5 % (ref 0–13.4)
NEUTROPHILS # BLD AUTO: 4.59 K/UL (ref 1.82–7.42)
NEUTROPHILS NFR BLD: 61.1 % (ref 44–72)
NRBC # BLD AUTO: 0 K/UL
NRBC BLD-RTO: 0 /100 WBC (ref 0–0.2)
PHOSPHATE SERPL-MCNC: 4.2 MG/DL (ref 2.5–4.5)
PLATELET # BLD AUTO: 143 K/UL (ref 164–446)
PMV BLD AUTO: 10 FL (ref 9–12.9)
POTASSIUM SERPL-SCNC: 4 MMOL/L (ref 3.6–5.5)
QFT TB2 - NIL TBQ2: 0 IU/ML
RBC # BLD AUTO: 3.9 M/UL (ref 4.7–6.1)
SODIUM SERPL-SCNC: 138 MMOL/L (ref 135–145)
WBC # BLD AUTO: 7.5 K/UL (ref 4.8–10.8)

## 2023-12-15 PROCEDURE — RXMED WILLOW AMBULATORY MEDICATION CHARGE: Performed by: INTERNAL MEDICINE

## 2023-12-15 PROCEDURE — 700105 HCHG RX REV CODE 258: Performed by: INTERNAL MEDICINE

## 2023-12-15 PROCEDURE — 99239 HOSP IP/OBS DSCHRG MGMT >30: CPT | Performed by: INTERNAL MEDICINE

## 2023-12-15 PROCEDURE — A9270 NON-COVERED ITEM OR SERVICE: HCPCS | Performed by: INTERNAL MEDICINE

## 2023-12-15 PROCEDURE — 99232 SBSQ HOSP IP/OBS MODERATE 35: CPT | Performed by: INTERNAL MEDICINE

## 2023-12-15 PROCEDURE — 85025 COMPLETE CBC W/AUTO DIFF WBC: CPT

## 2023-12-15 PROCEDURE — 80069 RENAL FUNCTION PANEL: CPT

## 2023-12-15 PROCEDURE — 700111 HCHG RX REV CODE 636 W/ 250 OVERRIDE (IP): Performed by: INTERNAL MEDICINE

## 2023-12-15 PROCEDURE — 700102 HCHG RX REV CODE 250 W/ 637 OVERRIDE(OP): Performed by: INTERNAL MEDICINE

## 2023-12-15 PROCEDURE — 36415 COLL VENOUS BLD VENIPUNCTURE: CPT

## 2023-12-15 RX ORDER — TAMSULOSIN HYDROCHLORIDE 0.4 MG/1
0.4 CAPSULE ORAL
Qty: 30 CAPSULE | Refills: 0 | Status: SHIPPED | OUTPATIENT
Start: 2023-12-16

## 2023-12-15 RX ORDER — FUROSEMIDE 40 MG/1
60 TABLET ORAL DAILY
Qty: 21 TABLET | Refills: 0 | Status: SHIPPED | OUTPATIENT
Start: 2023-12-15 | End: 2023-12-29

## 2023-12-15 RX ADMIN — SODIUM BICARBONATE 650 MG: 650 TABLET ORAL at 08:56

## 2023-12-15 RX ADMIN — ESCITALOPRAM OXALATE 10 MG: 10 TABLET ORAL at 04:40

## 2023-12-15 RX ADMIN — CARBAMAZEPINE 200 MG: 200 TABLET ORAL at 04:40

## 2023-12-15 RX ADMIN — SODIUM BICARBONATE: 84 INJECTION, SOLUTION INTRAVENOUS at 03:47

## 2023-12-15 RX ADMIN — TAMSULOSIN HYDROCHLORIDE 0.4 MG: 0.4 CAPSULE ORAL at 08:56

## 2023-12-15 RX ADMIN — AMLODIPINE BESYLATE 10 MG: 10 TABLET ORAL at 04:40

## 2023-12-15 RX ADMIN — HEPARIN SODIUM 5000 UNITS: 5000 INJECTION, SOLUTION INTRAVENOUS; SUBCUTANEOUS at 04:40

## 2023-12-15 RX ADMIN — OMEPRAZOLE 20 MG: 20 CAPSULE, DELAYED RELEASE ORAL at 04:40

## 2023-12-15 ASSESSMENT — ENCOUNTER SYMPTOMS
COUGH: 0
VOMITING: 0
ABDOMINAL PAIN: 0
NAUSEA: 0
FEVER: 0
SHORTNESS OF BREATH: 0
CHILLS: 0
WEAKNESS: 1

## 2023-12-15 ASSESSMENT — FIBROSIS 4 INDEX: FIB4 SCORE: 0.98

## 2023-12-15 NOTE — PROGRESS NOTES
Nephrology Daily Progress Note    Date of Service  12/15/2023    Chief Complaint  68 y.o. male admitted 12/13/2023 with weakness, acute kidney injury and hyperkalemia    Interval Problem Update  Patient feels little bit better, still fatigue, decreased p.o. intake.  12/15 patient feels better, no new complaints  He is anxious to go home  Review of Systems  Review of Systems   Constitutional:  Negative for chills, fever and malaise/fatigue.   Respiratory:  Negative for cough and shortness of breath.    Cardiovascular:  Negative for chest pain and leg swelling.   Gastrointestinal:  Negative for nausea and vomiting.   Genitourinary:  Negative for dysuria, frequency and urgency.        Physical Exam  Temp:  [36.9 °C (98.4 °F)-37.3 °C (99.1 °F)] 37 °C (98.6 °F)  Pulse:  [72-99] 72  Resp:  [16-18] 18  BP: (106-123)/(68-75) 117/68  SpO2:  [91 %-99 %] 93 %    Physical Exam  Vitals and nursing note reviewed.   Constitutional:       General: He is not in acute distress.     Appearance: He is not ill-appearing.   HENT:      Head: Normocephalic and atraumatic.      Right Ear: External ear normal.      Left Ear: External ear normal.      Nose: Nose normal.   Eyes:      General:         Right eye: No discharge.         Left eye: No discharge.      Conjunctiva/sclera: Conjunctivae normal.   Cardiovascular:      Rate and Rhythm: Normal rate and regular rhythm.      Heart sounds: No murmur heard.  Pulmonary:      Effort: Pulmonary effort is normal. No respiratory distress.      Breath sounds: Normal breath sounds. No wheezing.   Musculoskeletal:         General: No tenderness or deformity.      Right lower leg: No edema.      Left lower leg: No edema.   Skin:     General: Skin is warm.   Neurological:      General: No focal deficit present.      Mental Status: He is alert and oriented to person, place, and time.   Psychiatric:         Mood and Affect: Mood normal.         Behavior: Behavior normal.         Fluids    Intake/Output  "Summary (Last 24 hours) at 12/15/2023 1320  Last data filed at 12/15/2023 1244  Gross per 24 hour   Intake 200 ml   Output 1800 ml   Net -1600 ml         Laboratory  Recent Labs     12/13/23  1439 12/14/23  0129 12/14/23  0927 12/15/23  0320   WBC 10.3 6.4  --  7.5   RBC 4.52* 3.92*  --  3.90*   HEMOGLOBIN 13.7* 11.8* 12.1* 12.0*   HEMATOCRIT 43.4 36.1*  --  34.6*   MCV 96.0 92.1  --  88.7   MCH 30.3 30.1  --  30.8   MCHC 31.6* 32.7  --  34.7   RDW 45.5 44.0  --  41.7   PLATELETCT 206 163*  --  143*   MPV 10.1 10.1  --  10.0       Recent Labs     12/14/23  0129 12/14/23  1719 12/15/23  0320   SODIUM 137 136 138   POTASSIUM 6.1* 4.3 4.0   CHLORIDE 106 97 95*   CO2 19* 25 30   GLUCOSE 116* 134* 108*   BUN 31* 31* 31*   CREATININE 4.43* 4.46* 4.57*   CALCIUM 8.8 8.4* 7.7*           No results for input(s): \"NTPROBNP\" in the last 72 hours.        Imaging  US-RENAL   Final Result      1.  Features of medical renal disease.   2.  No hydronephrosis.            Assessment/Plan  1 SHANA on CKD stage IV: Possible progression of kidney disease,  2 hyperkalemia  3 metabolic acidosis  4 hypertension  no acute need for HD  Renal diet  Continue oral Lasix  Renal dose all meds  Avoid nephrotoxins like NSAIDs.  Okay to discharge home  Check labs as an outpatient in 1 week  Follow-up in the office in 2 to 3 weeks  Discussed with Dr. Moses  "

## 2023-12-15 NOTE — DISCHARGE INSTRUCTIONS
Discharge Instructions per Chandu Moses M.D.    DIAGNOSIS: You had kidney dysfunction that led to high potassium. Please stop potassium supplement and Losartan. Take Lasix as prescribed. Go to [facility] lab in 1 week for lab testing. Please follow up with your primary care doctor after labs are done. I placed a referral for nephrology.

## 2023-12-15 NOTE — PROGRESS NOTES
Hospital Medicine Daily Progress Note    Date of Service  12/15/2023    Chief Complaint  Roby Viramontes II is a 68 y.o. male admitted 12/13/2023 with weakness    Hospital Course  67 yo man with CKD IV, HTN, HLD, seizure who presented with [facility] with weakness and found with hyperkalemia of 8 and transferred to [facility]. Nephrology was consulted.    Interval Problem Update  12/14 - K remains 6.1, creat about the same. He still feels weak. I updated his wife.     12/15 - UOP as been good. K normalized to 4.0. Creat remains high 4.26. He feels overall well, PT to work with him. F/u nephro recs    I have discussed this patient's plan of care and discharge plan at IDT rounds today with Case Management, Nursing, Nursing leadership, and other members of the IDT team.    Consultants/Specialty  nephrology    Code Status  Full Code    Disposition  The patient is not medically cleared for discharge to home or a post-acute facility.  Anticipate discharge to: home with close outpatient follow-up    I have placed the appropriate orders for post-discharge needs.    Review of Systems  Review of Systems   Constitutional:  Negative for malaise/fatigue.   Respiratory:  Negative for shortness of breath.    Cardiovascular:  Negative for chest pain.   Gastrointestinal:  Negative for abdominal pain and vomiting.   Genitourinary:  Negative for dysuria.   Neurological:  Positive for weakness.        Physical Exam  Temp:  [36.8 °C (98.2 °F)-37.3 °C (99.1 °F)] 37.3 °C (99.1 °F)  Pulse:  [82-99] 82  Resp:  [16-18] 16  BP: (106-123)/(67-75) 120/73  SpO2:  [91 %-99 %] 93 %    Physical Exam  Vitals and nursing note reviewed.   Constitutional:       General: He is not in acute distress.     Appearance: He is not toxic-appearing.   HENT:      Head: Normocephalic.      Mouth/Throat:      Mouth: Mucous membranes are moist.   Eyes:      General:         Right eye: No discharge.         Left eye: No discharge.   Cardiovascular:      Rate and  Rhythm: Normal rate and regular rhythm.   Pulmonary:      Effort: Pulmonary effort is normal. No respiratory distress.      Breath sounds: No wheezing or rales.   Abdominal:      Palpations: Abdomen is soft.      Tenderness: There is no abdominal tenderness.   Musculoskeletal:         General: No swelling.      Cervical back: Neck supple.   Skin:     General: Skin is warm and dry.   Neurological:      Mental Status: He is alert and oriented to person, place, and time.         Fluids    Intake/Output Summary (Last 24 hours) at 12/15/2023 1203  Last data filed at 12/15/2023 0347  Gross per 24 hour   Intake 200 ml   Output 1800 ml   Net -1600 ml       Laboratory  Recent Labs     12/13/23  1439 12/14/23  0129 12/14/23  0927 12/15/23  0320   WBC 10.3 6.4  --  7.5   RBC 4.52* 3.92*  --  3.90*   HEMOGLOBIN 13.7* 11.8* 12.1* 12.0*   HEMATOCRIT 43.4 36.1*  --  34.6*   MCV 96.0 92.1  --  88.7   MCH 30.3 30.1  --  30.8   MCHC 31.6* 32.7  --  34.7   RDW 45.5 44.0  --  41.7   PLATELETCT 206 163*  --  143*   MPV 10.1 10.1  --  10.0     Recent Labs     12/14/23  0129 12/14/23  1719 12/15/23  0320   SODIUM 137 136 138   POTASSIUM 6.1* 4.3 4.0   CHLORIDE 106 97 95*   CO2 19* 25 30   GLUCOSE 116* 134* 108*   BUN 31* 31* 31*   CREATININE 4.43* 4.46* 4.57*   CALCIUM 8.8 8.4* 7.7*                   Imaging  US-RENAL   Final Result      1.  Features of medical renal disease.   2.  No hydronephrosis.           Assessment/Plan  * Hyperkalemia- (present on admission)  Assessment & Plan  Improved with high dose lasix, trend level  Nephrology following    SHANA on CKD stage III  Assessment & Plan  Question of progression to CKD stage V requiring hemodialysis now, given acidosis and hyperkalemia  Management per nephrology. Continue bicarb infusion, follow up recs regarding whether he needs dialysis  Ultrasound negative for obstruction    Metabolic acidosis- (present on admission)  Assessment & Plan  Due to renal failure  Continue bicarb infusion  per nephrology    ADHD (attention deficit hyperactivity disorder), combined type- (present on admission)  Assessment & Plan  Continue Lexapro, Seroquel    Seizure (HCC)- (present on admission)  Assessment & Plan  Continue carbamazepine    Hypertension- (present on admission)  Assessment & Plan  Hold losartan and eplerenone  Continue amlodipine  BP is stable  Hydralazine as needed         VTE prophylaxis:    heparin ppx      I have performed a physical exam and reviewed and updated ROS and Plan today (12/15/2023). In review of yesterday's note (12/14/2023), there are no changes except as documented above.

## 2023-12-15 NOTE — PROGRESS NOTES
Monitor Summary     Rhythm: SR  Heart Rate: 77-96  Ectopy: R PVC, R PAC  Measurement: .18/.11/.39

## 2023-12-15 NOTE — PROGRESS NOTES
Monitor Summary    SR 80-86  [facility]  .21/.41/.50

## 2023-12-15 NOTE — DISCHARGE PLANNING
Case Management Discharge Planning    Admission Date: 12/13/2023  GMLOS: 3.1  ALOS: 2    6-Clicks ADL Score: 24  6-Clicks Mobility Score: 24      Anticipated Discharge Dispo: Discharge Disposition: Discharged to home/self care (01)  Per chart review pt resides in [place]    Family support:  Name Relation Home Work Mobile   emily castro Spouse [phone]   Current Insurance on file: Medicare and Miscellaneous    DME Needed: pending hospital course    Action(s) Taken: chart reviewed    Pt discussed during IDT rounds that pt might need a new dialysis chair.    Escalations Completed: None    Medically Clear: No    Next Steps:  f/u with pt and medical team to discuss dc needs and barriers.    Barriers to Discharge: Medical clearance    Is the patient up for discharge tomorrow: No

## 2023-12-15 NOTE — DISCHARGE SUMMARY
Discharge Summary    CHIEF COMPLAINT ON ADMISSION  Chief Complaint   Patient presents with    Weakness     Pt transferred from [facility] after presenting this morning for generalized weakness that started on Monday. Pt A+Ox4.       Reason for Admission  ems     Admission Date  12/13/2023    CODE STATUS  Full Code    HPI & HOSPITAL COURSE  67 yo man with CKD IV, HTN, HLD, seizure who presented with [facility] with weakness and found with hyperkalemia of 8 and transferred to [facility]. Nephrology was consulted. He was started on IV bicarb infusion and high dose lasix. Hyperkalemia improved. Creat remained unchanged. Dr. Najjar recommended discharge with 1 week repeat labs, oral lasix and close follow up.  I discussed with patient and wife, ordered for outpatient BMP and they will follow up with PCP office. I placed referral for nephrology.    Therefore, he is discharged in good and stable condition to home with organized home healthcare and close outpatient follow-up.    The patient met 2-midnight criteria for an inpatient stay at the time of discharge.    Discharge Date  12/15/23    FOLLOW UP ITEMS POST DISCHARGE  BMP in 1 week  Referral for outpatient nephrology placed    DISCHARGE DIAGNOSES  Principal Problem:    Hyperkalemia (POA: Yes)  Active Problems:    Hypertension (POA: Yes)    Seizure (HCC) (POA: Yes)    ADHD (attention deficit hyperactivity disorder), combined type (POA: Yes)      Overview:           Metabolic acidosis (POA: Yes)    SHANA on CKD stage III (POA: Unknown)  Resolved Problems:    * No resolved hospital problems. *      FOLLOW UP  Anna Barrera P.A.-C.  [address] [facility] [address] [phone]    Schedule an appointment as soon as possible for a visit  Please make an appointment in one week to have your labs checked.    [facility]  [id number]  [place] [address] [phone]  Call  Please call and make an appointment  in 2-3 weeks.      MEDICATIONS ON DISCHARGE     Medication List        START taking these medications        Instructions   furosemide 40 MG Tabs  Commonly known as: Lasix   Take 1.5 Tablets by mouth every day for 14 days.  Dose: 60 mg     tamsulosin 0.4 MG capsule  Start taking on: December 16, 2023  Commonly known as: Flomax   Take 1 Capsule by mouth 1/2 hour after breakfast.  Dose: 0.4 mg            CONTINUE taking these medications        Instructions   amLODIPine 10 MG Tabs  Commonly known as: Norvasc   Take 10 mg by mouth every day.  Dose: 10 mg     atorvastatin 40 MG Tabs  Commonly known as: Lipitor   Take 40 mg by mouth every evening.  Dose: 40 mg     calcitRIOL 0.25 MCG Caps  Commonly known as: Rocaltrol   Take 0.25 mcg by mouth every day.  Dose: 0.25 mcg     carBAMazepine 200 MG Tabs  Commonly known as: TEGretol   Take 200 mg by mouth 2 times a day.  Dose: 200 mg     escitalopram 10 MG Tabs  Commonly known as: Lexapro   Take 10 mg by mouth every day.  Dose: 10 mg     loperamide 2 MG Caps  Commonly known as: Imodium   Take 4 mg by mouth 2 times a day.  Dose: 4 mg     omeprazole 40 MG delayed-release capsule  Commonly known as: PriLOSEC   Take 40 mg by mouth every day.  Dose: 40 mg     QUEtiapine 25 MG Tabs  Commonly known as: SEROquel   Take 25 mg by mouth every evening.  Dose: 25 mg            STOP taking these medications      losartan 50 MG Tabs  Commonly known as: Cozaar     phosphorus 250 MG tablet  Commonly known as: K-Phos-Neutral     potassium chloride SA 20 MEQ Tbcr  Commonly known as: Kdur              Allergies  No Known Allergies    DIET  Orders Placed This Encounter   Procedures    Diet Order Diet: Renal     Standing Status:   Standing     Number of Occurrences:   1     Order Specific Question:   Diet:     Answer:   Renal [8]       ACTIVITY  As tolerated.    CONSULTATIONS  Dr. Najjar    PROCEDURES  US-RENAL   Final Result      1.  Features of medical renal disease.   2.  No hydronephrosis.             LABORATORY  Lab Results   Component Value Date    SODIUM 138 12/15/2023    POTASSIUM 4.0 12/15/2023    CHLORIDE 95 (L) 12/15/2023    CO2 30 12/15/2023    GLUCOSE 108 (H) 12/15/2023    BUN 31 (H) 12/15/2023    CREATININE 4.57 (HH) 12/15/2023        Lab Results   Component Value Date    WBC 7.5 12/15/2023    HEMOGLOBIN 12.0 (L) 12/15/2023    HEMATOCRIT 34.6 (L) 12/15/2023    PLATELETCT 143 (L) 12/15/2023        Total time of the discharge process exceeds 34 minutes.

## 2023-12-23 ENCOUNTER — HOSPITAL ENCOUNTER (OUTPATIENT)
Dept: LAB | Facility: MEDICAL CENTER | Age: 68
End: 2023-12-23
Attending: INTERNAL MEDICINE
Payer: MEDICARE

## 2023-12-23 LAB
ALBUMIN SERPL BCP-MCNC: 4.5 G/DL (ref 3.2–4.9)
APPEARANCE UR: CLEAR
BACTERIA #/AREA URNS HPF: NEGATIVE /HPF
BILIRUB UR QL STRIP.AUTO: NEGATIVE
BUN SERPL-MCNC: 37 MG/DL (ref 8–22)
CALCIUM ALBUM COR SERPL-MCNC: 8.3 MG/DL (ref 8.5–10.5)
CALCIUM SERPL-MCNC: 8.7 MG/DL (ref 8.5–10.5)
CHLORIDE SERPL-SCNC: 95 MMOL/L (ref 96–112)
CO2 SERPL-SCNC: 21 MMOL/L (ref 20–33)
COLOR UR: YELLOW
CREAT SERPL-MCNC: 3.32 MG/DL (ref 0.5–1.4)
CREAT UR-MCNC: 219.21 MG/DL
CREAT UR-MCNC: 220.66 MG/DL
EPI CELLS #/AREA URNS HPF: NEGATIVE /HPF
GFR SERPLBLD CREATININE-BSD FMLA CKD-EPI: 19 ML/MIN/1.73 M 2
GLUCOSE SERPL-MCNC: 108 MG/DL (ref 65–99)
GLUCOSE UR STRIP.AUTO-MCNC: NEGATIVE MG/DL
HYALINE CASTS #/AREA URNS LPF: ABNORMAL /LPF
KETONES UR STRIP.AUTO-MCNC: ABNORMAL MG/DL
LEUKOCYTE ESTERASE UR QL STRIP.AUTO: NEGATIVE
MAGNESIUM SERPL-MCNC: 1.4 MG/DL (ref 1.5–2.5)
MICRO URNS: ABNORMAL
MICROALBUMIN UR-MCNC: 3.6 MG/DL
MICROALBUMIN/CREAT UR: 16 MG/G (ref 0–30)
NITRITE UR QL STRIP.AUTO: NEGATIVE
PH UR STRIP.AUTO: 5.5 [PH] (ref 5–8)
PHOSPHATE SERPL-MCNC: 2.5 MG/DL (ref 2.5–4.5)
POTASSIUM SERPL-SCNC: 3.7 MMOL/L (ref 3.6–5.5)
PROT UR QL STRIP: 30 MG/DL
PROT UR-MCNC: 37 MG/DL (ref 0–15)
PROT/CREAT UR: 169 MG/G (ref 15–68)
RBC # URNS HPF: ABNORMAL /HPF
RBC UR QL AUTO: NEGATIVE
SODIUM SERPL-SCNC: 135 MMOL/L (ref 135–145)
SP GR UR STRIP.AUTO: 1.02
UROBILINOGEN UR STRIP.AUTO-MCNC: 0.2 MG/DL
WBC #/AREA URNS HPF: ABNORMAL /HPF

## 2023-12-23 PROCEDURE — 86235 NUCLEAR ANTIGEN ANTIBODY: CPT | Mod: 91

## 2023-12-23 PROCEDURE — 84166 PROTEIN E-PHORESIS/URINE/CSF: CPT

## 2023-12-23 PROCEDURE — 81001 URINALYSIS AUTO W/SCOPE: CPT

## 2023-12-23 PROCEDURE — 84155 ASSAY OF PROTEIN SERUM: CPT

## 2023-12-23 PROCEDURE — 86334 IMMUNOFIX E-PHORESIS SERUM: CPT

## 2023-12-23 PROCEDURE — 82043 UR ALBUMIN QUANTITATIVE: CPT

## 2023-12-23 PROCEDURE — 84156 ASSAY OF PROTEIN URINE: CPT

## 2023-12-23 PROCEDURE — 36415 COLL VENOUS BLD VENIPUNCTURE: CPT

## 2023-12-23 PROCEDURE — 86335 IMMUNFIX E-PHORSIS/URINE/CSF: CPT

## 2023-12-23 PROCEDURE — 83735 ASSAY OF MAGNESIUM: CPT

## 2023-12-23 PROCEDURE — 84165 PROTEIN E-PHORESIS SERUM: CPT

## 2023-12-23 PROCEDURE — 82570 ASSAY OF URINE CREATININE: CPT | Mod: 91

## 2023-12-23 PROCEDURE — 83521 IG LIGHT CHAINS FREE EACH: CPT | Mod: 91

## 2023-12-23 PROCEDURE — 80069 RENAL FUNCTION PANEL: CPT

## 2023-12-26 LAB
KAPPA LC FREE SER-MCNC: 62.84 MG/L (ref 3.3–19.4)
KAPPA LC FREE/LAMBDA FREE SER NEPH: 1.61 {RATIO} (ref 0.26–1.65)
LAMBDA LC FREE SERPL-MCNC: 39.09 MG/L (ref 5.71–26.3)

## 2023-12-26 NOTE — DISCHARGE INSTRUCTIONS
Your kidney function is currently at baseline, however you do appear slightly dehydrated, you have been given IV fluids.  Please follow-up with your kidney doctor and also your neurologist given the breakthrough seizure you had today.Discharge Instructions    Discharged to home by car with relative. Discharged via walking, hospital escort: Refused.  Special equipment needed: Not Applicable    Be sure to schedule a follow-up appointment with your primary care doctor or any specialists as instructed.     Discharge Plan:   Diet Plan: Discussed  Activity Level: Discussed  Confirmed Follow up Appointment: Patient to Call and Schedule Appointment  Confirmed Symptoms Management: Discussed  Medication Reconciliation Updated: Yes  Influenza Vaccine Indication: Not indicated: Previously immunized this influenza season and > 8 years of age    I understand that a diet low in cholesterol, fat, and sodium is recommended for good health. Unless I have been given specific instructions below for another diet, I accept this instruction as my diet prescription.   Other diet: Heart Healthy    Special Instructions: None    -Is this patient being discharged with medication to prevent blood clots?  No    Is patient discharged on Warfarin / Coumadin?   No     
none

## 2023-12-27 LAB
ALBUMIN SERPL ELPH-MCNC: 4.34 G/DL (ref 3.75–5.01)
ALPHA1 GLOB SERPL ELPH-MCNC: 0.37 G/DL (ref 0.19–0.46)
ALPHA2 GLOB SERPL ELPH-MCNC: 1.05 G/DL (ref 0.48–1.05)
B-GLOBULIN SERPL ELPH-MCNC: 0.81 G/DL (ref 0.48–1.1)
ENA SM IGG SER-ACNC: 0 AU/ML (ref 0–40)
ENA SS-B IGG SER IA-ACNC: 0 AU/ML (ref 0–40)
GAMMA GLOB SERPL ELPH-MCNC: 0.93 G/DL (ref 0.62–1.51)
INTERPRETATION SERPL IFE-IMP: NORMAL
INTERPRETATION SERPL IFE-IMP: NORMAL
MONOCLONAL PROTEIN NL11656: NORMAL G/DL
PATHOLOGY STUDY: NORMAL
PROT SERPL-MCNC: 7.5 G/DL (ref 6.3–8.2)
SSA52 R0ENA AB IGG Q0420: 1 AU/ML (ref 0–40)
SSA60 R0ENA AB IGG Q0419: 0 AU/ML (ref 0–40)
U1 SNRNP IGG SER QL: 3 UNITS (ref 0–19)

## 2023-12-29 LAB
ALBUMIN 24H MFR UR ELPH: 44.2 %
ALPHA1 GLOB 24H MFR UR ELPH: 24.9 %
ALPHA2 GLOB 24H MFR UR ELPH: 30.9 %
B-GLOBULIN 24H MFR UR ELPH: 0 %
COLLECT DURATION TIME SPEC: NORMAL HRS
EER MONOCLONAL PROTEIN STUDY, 24 HOUR U Q5964: NORMAL
GAMMA GLOB 24H MFR UR ELPH: 0 %
INTERPRETATION UR IFE-IMP: NORMAL
M PROTEIN 24H MFR UR ELPH: 0 %
M PROTEIN 24H UR ELPH-MRATE: NORMAL MG/24 HRS
PROT 24H UR-MRATE: NORMAL MG/D (ref 40–150)
PROT UR-MCNC: 35 MG/DL
SPECIMEN VOL ?TM UR: NORMAL ML

## 2024-02-23 ENCOUNTER — HOSPITAL ENCOUNTER (OUTPATIENT)
Dept: LAB | Facility: MEDICAL CENTER | Age: 69
End: 2024-02-23
Attending: INTERNAL MEDICINE
Payer: MEDICARE

## 2024-02-23 LAB
25(OH)D3 SERPL-MCNC: 45 NG/ML (ref 30–100)
BASOPHILS # BLD AUTO: 0.4 % (ref 0–1.8)
BASOPHILS # BLD: 0.03 K/UL (ref 0–0.12)
EOSINOPHIL # BLD AUTO: 0 K/UL (ref 0–0.51)
EOSINOPHIL NFR BLD: 0 % (ref 0–6.9)
ERYTHROCYTE [DISTWIDTH] IN BLOOD BY AUTOMATED COUNT: 44.8 FL (ref 35.9–50)
FERRITIN SERPL-MCNC: 57.2 NG/ML (ref 22–322)
HCT VFR BLD AUTO: 40.5 % (ref 42–52)
HGB BLD-MCNC: 13.4 G/DL (ref 14–18)
IMM GRANULOCYTES # BLD AUTO: 0.01 K/UL (ref 0–0.11)
IMM GRANULOCYTES NFR BLD AUTO: 0.1 % (ref 0–0.9)
LYMPHOCYTES # BLD AUTO: 2.15 K/UL (ref 1–4.8)
LYMPHOCYTES NFR BLD: 29.3 % (ref 22–41)
MCH RBC QN AUTO: 30.9 PG (ref 27–33)
MCHC RBC AUTO-ENTMCNC: 33.1 G/DL (ref 32.3–36.5)
MCV RBC AUTO: 93.3 FL (ref 81.4–97.8)
MONOCYTES # BLD AUTO: 0.56 K/UL (ref 0–0.85)
MONOCYTES NFR BLD AUTO: 7.6 % (ref 0–13.4)
NEUTROPHILS # BLD AUTO: 4.6 K/UL (ref 1.82–7.42)
NEUTROPHILS NFR BLD: 62.6 % (ref 44–72)
NRBC # BLD AUTO: 0 K/UL
NRBC BLD-RTO: 0 /100 WBC (ref 0–0.2)
PLATELET # BLD AUTO: 174 K/UL (ref 164–446)
PMV BLD AUTO: 10.3 FL (ref 9–12.9)
PTH-INTACT SERPL-MCNC: 291 PG/ML (ref 14–72)
RBC # BLD AUTO: 4.34 M/UL (ref 4.7–6.1)
WBC # BLD AUTO: 7.4 K/UL (ref 4.8–10.8)

## 2024-02-23 PROCEDURE — 83970 ASSAY OF PARATHORMONE: CPT

## 2024-02-23 PROCEDURE — 80069 RENAL FUNCTION PANEL: CPT

## 2024-02-23 PROCEDURE — 36415 COLL VENOUS BLD VENIPUNCTURE: CPT

## 2024-02-23 PROCEDURE — 82306 VITAMIN D 25 HYDROXY: CPT

## 2024-02-23 PROCEDURE — 83550 IRON BINDING TEST: CPT

## 2024-02-23 PROCEDURE — 82728 ASSAY OF FERRITIN: CPT

## 2024-02-23 PROCEDURE — 83540 ASSAY OF IRON: CPT

## 2024-02-23 PROCEDURE — 83735 ASSAY OF MAGNESIUM: CPT

## 2024-02-23 PROCEDURE — 85025 COMPLETE CBC W/AUTO DIFF WBC: CPT

## 2024-02-24 LAB
ALBUMIN SERPL BCP-MCNC: 4.3 G/DL (ref 3.2–4.9)
BUN SERPL-MCNC: 26 MG/DL (ref 8–22)
CALCIUM ALBUM COR SERPL-MCNC: 9.1 MG/DL (ref 8.5–10.5)
CALCIUM SERPL-MCNC: 9.3 MG/DL (ref 8.5–10.5)
CHLORIDE SERPL-SCNC: 106 MMOL/L (ref 96–112)
CO2 SERPL-SCNC: 18 MMOL/L (ref 20–33)
CREAT SERPL-MCNC: 2.93 MG/DL (ref 0.5–1.4)
GFR SERPLBLD CREATININE-BSD FMLA CKD-EPI: 22 ML/MIN/1.73 M 2
GLUCOSE SERPL-MCNC: 104 MG/DL (ref 65–99)
IRON SATN MFR SERPL: 53 % (ref 15–55)
IRON SERPL-MCNC: 130 UG/DL (ref 50–180)
MAGNESIUM SERPL-MCNC: 1.6 MG/DL (ref 1.5–2.5)
PHOSPHATE SERPL-MCNC: 2.7 MG/DL (ref 2.5–4.5)
POTASSIUM SERPL-SCNC: 5.1 MMOL/L (ref 3.6–5.5)
SODIUM SERPL-SCNC: 139 MMOL/L (ref 135–145)
TIBC SERPL-MCNC: 247 UG/DL (ref 250–450)
UIBC SERPL-MCNC: 117 UG/DL (ref 110–370)

## 2024-04-09 ENCOUNTER — HOSPITAL ENCOUNTER (EMERGENCY)
Facility: MEDICAL CENTER | Age: 69
End: 2024-04-09
Attending: EMERGENCY MEDICINE
Payer: MEDICARE

## 2024-04-09 VITALS
HEIGHT: 72 IN | SYSTOLIC BLOOD PRESSURE: 134 MMHG | TEMPERATURE: 97.4 F | WEIGHT: 180 LBS | DIASTOLIC BLOOD PRESSURE: 64 MMHG | OXYGEN SATURATION: 97 % | HEART RATE: 56 BPM | RESPIRATION RATE: 16 BRPM | BODY MASS INDEX: 24.38 KG/M2

## 2024-04-09 DIAGNOSIS — R56.9 SEIZURE (HCC): ICD-10-CM

## 2024-04-09 DIAGNOSIS — N18.9 CHRONIC KIDNEY DISEASE, UNSPECIFIED CKD STAGE: ICD-10-CM

## 2024-04-09 DIAGNOSIS — R78.89: ICD-10-CM

## 2024-04-09 LAB
ALBUMIN SERPL BCP-MCNC: 4.1 G/DL (ref 3.2–4.9)
ALBUMIN/GLOB SERPL: 1.6 G/DL
ALP SERPL-CCNC: 113 U/L (ref 30–99)
ALT SERPL-CCNC: 7 U/L (ref 2–50)
ANION GAP SERPL CALC-SCNC: 15 MMOL/L (ref 7–16)
AST SERPL-CCNC: 11 U/L (ref 12–45)
BASOPHILS # BLD AUTO: 0.2 % (ref 0–1.8)
BASOPHILS # BLD: 0.02 K/UL (ref 0–0.12)
BILIRUB SERPL-MCNC: 0.3 MG/DL (ref 0.1–1.5)
BUN SERPL-MCNC: 25 MG/DL (ref 8–22)
CALCIUM ALBUM COR SERPL-MCNC: 8.5 MG/DL (ref 8.5–10.5)
CALCIUM SERPL-MCNC: 8.6 MG/DL (ref 8.5–10.5)
CARBAMAZEPINE SERPL-MCNC: 11 UG/ML (ref 4–12)
CHLORIDE SERPL-SCNC: 109 MMOL/L (ref 96–112)
CO2 SERPL-SCNC: 15 MMOL/L (ref 20–33)
CREAT SERPL-MCNC: 2.88 MG/DL (ref 0.5–1.4)
EOSINOPHIL # BLD AUTO: 0 K/UL (ref 0–0.51)
EOSINOPHIL NFR BLD: 0 % (ref 0–6.9)
ERYTHROCYTE [DISTWIDTH] IN BLOOD BY AUTOMATED COUNT: 44.6 FL (ref 35.9–50)
GFR SERPLBLD CREATININE-BSD FMLA CKD-EPI: 23 ML/MIN/1.73 M 2
GLOBULIN SER CALC-MCNC: 2.5 G/DL (ref 1.9–3.5)
GLUCOSE SERPL-MCNC: 110 MG/DL (ref 65–99)
HCT VFR BLD AUTO: 37.4 % (ref 42–52)
HGB BLD-MCNC: 12.4 G/DL (ref 14–18)
IMM GRANULOCYTES # BLD AUTO: 0.02 K/UL (ref 0–0.11)
IMM GRANULOCYTES NFR BLD AUTO: 0.2 % (ref 0–0.9)
LYMPHOCYTES # BLD AUTO: 1.86 K/UL (ref 1–4.8)
LYMPHOCYTES NFR BLD: 22.7 % (ref 22–41)
MCH RBC QN AUTO: 30.6 PG (ref 27–33)
MCHC RBC AUTO-ENTMCNC: 33.2 G/DL (ref 32.3–36.5)
MCV RBC AUTO: 92.3 FL (ref 81.4–97.8)
MONOCYTES # BLD AUTO: 0.57 K/UL (ref 0–0.85)
MONOCYTES NFR BLD AUTO: 7 % (ref 0–13.4)
NEUTROPHILS # BLD AUTO: 5.71 K/UL (ref 1.82–7.42)
NEUTROPHILS NFR BLD: 69.9 % (ref 44–72)
NRBC # BLD AUTO: 0 K/UL
NRBC BLD-RTO: 0 /100 WBC (ref 0–0.2)
PLATELET # BLD AUTO: 145 K/UL (ref 164–446)
PMV BLD AUTO: 10.2 FL (ref 9–12.9)
POTASSIUM SERPL-SCNC: 3.8 MMOL/L (ref 3.6–5.5)
PROT SERPL-MCNC: 6.6 G/DL (ref 6–8.2)
RBC # BLD AUTO: 4.05 M/UL (ref 4.7–6.1)
SODIUM SERPL-SCNC: 139 MMOL/L (ref 135–145)
WBC # BLD AUTO: 8.2 K/UL (ref 4.8–10.8)

## 2024-04-09 PROCEDURE — 80053 COMPREHEN METABOLIC PANEL: CPT

## 2024-04-09 PROCEDURE — 80156 ASSAY CARBAMAZEPINE TOTAL: CPT

## 2024-04-09 PROCEDURE — 99283 EMERGENCY DEPT VISIT LOW MDM: CPT

## 2024-04-09 PROCEDURE — 85025 COMPLETE CBC W/AUTO DIFF WBC: CPT

## 2024-04-09 PROCEDURE — 36415 COLL VENOUS BLD VENIPUNCTURE: CPT

## 2024-04-09 ASSESSMENT — FIBROSIS 4 INDEX: FIB4 SCORE: 0.8

## 2024-04-09 NOTE — ED PROVIDER NOTES
ED Provider Note    CHIEF COMPLAINT  Chief Complaint   Patient presents with    Seizure     Pt was visiting his wife at [facility] and had a witnessed tonic clonic seizure lasting approx 30 seconds. Pt states he has a hx of seizures, compliant with all of his medications.        EXTERNAL RECORDS REVIEWED  Inpatient Notes the patient has a discharge summary from December 15, 2023 when he was admitted for hyperkalemia    HPI/ROS  LIMITATION TO HISTORY   Select: The patient does not know what medications he is on  OUTSIDE HISTORIAN(S):  Patient's daughter    Roby Viramontes II is a 68 y.o. male who presents with past medical history significant for chronic kidney disease, high cholesterol, hypertension, seizure disorder, the patient was visiting his wife today at [facility] when he had a tonic-clonic seizure that was witnessed.  He reports his last seizure was in 2023.  He currently says he feels fine with no focal neurologic deficits.  He feels that he is at his baseline.  He denies hitting his head.  He denies headache.    PAST MEDICAL HISTORY   has a past medical history of CKD (chronic kidney disease) stage 4, GFR 15-29 ml/min ([facility]) (2021), Dyslipidemia, Hypertension, Hypophosphatemia, Psychiatric problem, and Seizure disorder ([facility]).    SURGICAL HISTORY   has a past surgical history that includes gastroscopy-endo (7/10/2020).    FAMILY HISTORY  History reviewed. No pertinent family history.    SOCIAL HISTORY  Social History     Tobacco Use    Smoking status: Former     Current packs/day: 0.00     Types: Cigarettes     Quit date: 1990     Years since quittin.2    Smokeless tobacco: Never    Tobacco comments:     [facility]    Vaping Use    Vaping Use: Never used   Substance and Sexual Activity    Alcohol use: Not Currently    Drug use: Not Currently    Sexual activity: Not on file       CURRENT MEDICATIONS     Start End   tamsulosin (FLOMAX) 0.4 MG capsule 2023 --    Sig: Take 1 Capsule by mouth 1/2 hour after breakfast.   Route: Oral   Renewals    Renewal requests to authorizing provider (Chandu Moses M.D.) prohibited      Number of times this order has been changed since signing: 3     Order Audit [place]     loperamide (IMODIUM) 2 MG Cap  --   Sig: Take 4 mg by mouth 2 times a day.   Class: Historical Med   Route: Oral   Number of times this order has been changed since signin     Order Audit [place]     calcitRIOL (ROCALTROL) 0.25 MCG Cap  --   Sig: Take 0.25 mcg by mouth every day.   Class: Historical Med   Route: Oral   Number of times this order has been changed since signin     Order Audit [place]     omeprazole (PRILOSEC) 40 MG delayed-release capsule  --   Sig: Take 40 mg by mouth every day.   Class: Historical Med   Route: Oral   Number of times this order has been changed since signing: 3     Order Audit [place]     QUEtiapine (SEROQUEL) 25 MG Tab  --   Sig: Take 25 mg by mouth every evening.   Class: Historical Med   Route: Oral   Number of times this order has been changed since signin     Order Audit [place]     escitalopram (LEXAPRO) 10 MG Tab  --   Sig: Take 10 mg by mouth every day.   Class: Historical Med   Route: Oral   Number of times this order has been changed since signin     Order Audit [place]     carBAMazepine (TEGRETOL) 200 MG Tab  --   Sig: Take 200 mg by mouth 2 times a day.   Class: Historical Med   Route: Oral   Number of times this order has been changed since signin     Order Audit [place]     amLODIPine (NORVASC) 10 MG Tab  --   Sig: Take 10 mg by mouth every day.   Class: Historical Med   Route: Oral   Number of times this order has been changed since signin     Order Audit [place]     atorvastatin (LIPITOR) 40 MG Tab  --   Sig: Take 40 mg by mouth every evening.   Class: Historical Med   Route: Oral   Number of times this order has been changed since signing: 3     Order Audit [place]         ALLERGIES  No Known Allergies    PHYSICAL  EXAM  VITAL SIGNS: /64   Pulse (!) 56   Temp 36.3 °C (97.4 °F) (Temporal)   Resp 16   Ht 1.829 m (6')   Wt 81.6 kg (180 lb)   SpO2 97%   BMI 24.41 kg/m²    Constitutional: Alert.  HENT: Abrasion to the superior lateral aspect of the right eye with no bony step-off.  It is not tender to the touch.  Eyes: Pupils are equal and reactive, Conjunctiva normal, Non-icteric.   Neck: Normal range of motion, No tenderness, Supple, No stridor.   Lymphatic: No lymphadenopathy noted.   Cardiovascular: Regular rate and rhythm, no murmurs.   Thorax & Lungs: Normal breath sounds, No respiratory distress, No wheezing, No chest tenderness.   Abdomen: Bowel sounds normal, Soft, No tenderness, No peritoneal signs, No masses, No pulsatile masses.   Skin: Warm, Dry, No erythema, No rash.   Extremities: Intact distal pulses, No edema, No tenderness, No cyanosis.  Musculoskeletal: Good range of motion in all major joints. No tenderness to palpation or major deformities noted.   Neurologic: Alert , Normal motor function, Normal sensory function, No focal deficits noted.   Psychiatric: Affect normal, Judgment normal, Mood normal.       EKG/LABS    Labs Reviewed   CBC WITH DIFFERENTIAL - Abnormal; Notable for the following components:       Result Value    RBC 4.05 (*)     Hemoglobin 12.4 (*)     Hematocrit 37.4 (*)     Platelet Count 145 (*)     All other components within normal limits   COMP METABOLIC PANEL - Abnormal; Notable for the following components:    Co2 15 (*)     Glucose 110 (*)     Bun 25 (*)     Creatinine 2.88 (*)     AST(SGOT) 11 (*)     Alkaline Phosphatase 113 (*)     All other components within normal limits   ESTIMATED GFR - Abnormal; Notable for the following components:    GFR (CKD-EPI) 23 (*)     All other components within normal limits   CARBAMAZEPINE           COURSE & MEDICAL DECISION MAKING    ASSESSMENT, COURSE AND PLAN  Care Narrative:     The patient presents with a history of seizure, our records  indicate that he is on Tegretol.  Tegretol level was ordered, CBC and CMP was ordered.  I have asked the med rec tech to determine his medications, he is not sure what he is taking for seizure.  Differential diagnosis includes subtherapeutic carbamazepine level, electrolyte abnormality.      1:01 PM the patient's carbamazepine level is within therapeutic level.  I confirmed with the med rec tech that the patient is on carbamazepine for his seizure disorder.  He has slight acidosis which is consistent with postictal period otherwise his potassium and sodium are normal.  He is discharged, at this time we will not modify his medications.            ADDITIONAL PROBLEMS MANAGED  Seizure    DISPOSITION AND DISCUSSIONS  I have discussed management of the patient with the following physicians and ROSA's: None    Discussion of management with other [facility] or appropriate source(s):     Med rec tech to determine his medications.    Escalation of care considered, and ultimately not performed:acute inpatient care management, however at this time, the patient is most appropriate for outpatient management    Barriers to care at this time, including but not limited to:  None .     Decision tools and prescription drugs considered including, but not limited to:  Considered medication modification but at this time he has infrequent seizures, if they become more frequent he may require a different or increased dose of antiseizure medication.    The patient will return for new or worsening symptoms and is stable at the time of discharge.    The patient is referred to a primary physician for blood pressure management, diabetic screening, and for all other preventative health concerns.        DISPOSITION:  Patient will be discharged home in stable condition.    FOLLOW UP:  [facility], Emergency Dept  [address] [facility] [address] [phone]    If symptoms worsen    Anna Barrera P.A.-C.  [address]  [place] [address]  [phone]      As needed      OUTPATIENT MEDICATIONS:  Discharge Medication List as of 4/9/2024  1:07 PM            FINAL DIAGNOSIS  1. Seizure (HCC)    2. Chronic kidney disease, unspecified CKD stage    3. Serum carbamazepine within therapeutic range           Electronically signed by: Lalo Street M.D., 4/9/2024 12:16 PM

## 2024-04-09 NOTE — ED NOTES
Bedside report from KALA Bryan    ABCs intact. Pt does not require O2 at this time. Fall precautions in place.

## 2024-04-09 NOTE — ED TRIAGE NOTES
Chief Complaint   Patient presents with    Seizure     Pt was visiting his wife at [facility] and had a witnessed tonic clonic seizure lasting approx 30 seconds. Pt states he has a hx of seizures, compliant with all of his medications.        Pt BIBA from [facility] with the above complaint. Pt is now GCS 15. FSBG 122.     /63   Pulse (!) 51   Resp 16   Ht 1.829 m (6')   Wt 81.6 kg (180 lb)   SpO2 95%   BMI 24.41 kg/m²

## 2024-05-25 ENCOUNTER — HOSPITAL ENCOUNTER (OUTPATIENT)
Facility: MEDICAL CENTER | Age: 69
End: 2024-05-26
Attending: STUDENT IN AN ORGANIZED HEALTH CARE EDUCATION/TRAINING PROGRAM | Admitting: INTERNAL MEDICINE
Payer: MEDICARE

## 2024-05-25 ENCOUNTER — APPOINTMENT (OUTPATIENT)
Dept: RADIOLOGY | Facility: MEDICAL CENTER | Age: 69
End: 2024-05-25
Attending: EMERGENCY MEDICINE
Payer: MEDICARE

## 2024-05-25 ENCOUNTER — HOSPITAL ENCOUNTER (EMERGENCY)
Facility: MEDICAL CENTER | Age: 69
End: 2024-05-25
Attending: EMERGENCY MEDICINE
Payer: MEDICARE

## 2024-05-25 ENCOUNTER — APPOINTMENT (OUTPATIENT)
Dept: RADIOLOGY | Facility: MEDICAL CENTER | Age: 69
End: 2024-05-25
Attending: STUDENT IN AN ORGANIZED HEALTH CARE EDUCATION/TRAINING PROGRAM
Payer: MEDICARE

## 2024-05-25 VITALS
BODY MASS INDEX: 22.93 KG/M2 | OXYGEN SATURATION: 97 % | DIASTOLIC BLOOD PRESSURE: 100 MMHG | TEMPERATURE: 98 F | HEART RATE: 62 BPM | RESPIRATION RATE: 15 BRPM | HEIGHT: 71 IN | WEIGHT: 163.8 LBS | SYSTOLIC BLOOD PRESSURE: 150 MMHG

## 2024-05-25 DIAGNOSIS — R53.1 WEAKNESS: ICD-10-CM

## 2024-05-25 DIAGNOSIS — N17.9 AKI (ACUTE KIDNEY INJURY) (HCC): ICD-10-CM

## 2024-05-25 DIAGNOSIS — R56.9 SEIZURE (HCC): ICD-10-CM

## 2024-05-25 DIAGNOSIS — S09.90XA CLOSED HEAD INJURY, INITIAL ENCOUNTER: ICD-10-CM

## 2024-05-25 DIAGNOSIS — R78.89 ELEVATED TEGRETOL LEVEL: ICD-10-CM
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
## 2024-05-25 PROBLEM — T42.1X1A: Status: ACTIVE | Noted: 2024-05-25
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
## 2024-05-25 PROBLEM — R13.10 DYSPHAGIA: Status: ACTIVE | Noted: 2024-05-25

## 2024-05-25 LAB
ALBUMIN SERPL BCP-MCNC: 5 G/DL (ref 3.2–4.9)
ALBUMIN SERPL BCP-MCNC: 5 G/DL (ref 3.2–4.9)
ALBUMIN/GLOB SERPL: 1.4 G/DL
ALBUMIN/GLOB SERPL: 1.4 G/DL
ALP SERPL-CCNC: 137 U/L (ref 30–99)
ALP SERPL-CCNC: 139 U/L (ref 30–99)
ALT SERPL-CCNC: 14 U/L (ref 2–50)
ALT SERPL-CCNC: 15 U/L (ref 2–50)
AMORPH CRY #/AREA URNS HPF: PRESENT /HPF
ANION GAP SERPL CALC-SCNC: 22 MMOL/L (ref 7–16)
ANION GAP SERPL CALC-SCNC: 23 MMOL/L (ref 7–16)
APPEARANCE UR: ABNORMAL
AST SERPL-CCNC: 17 U/L (ref 12–45)
AST SERPL-CCNC: 18 U/L (ref 12–45)
BACTERIA #/AREA URNS HPF: ABNORMAL /HPF
BASE EXCESS BLDV CALC-SCNC: -4 MMOL/L
BASOPHILS # BLD AUTO: 0.1 % (ref 0–1.8)
BASOPHILS # BLD AUTO: 0.1 % (ref 0–1.8)
BASOPHILS # BLD: 0.02 K/UL (ref 0–0.12)
BASOPHILS # BLD: 0.02 K/UL (ref 0–0.12)
BILIRUB SERPL-MCNC: 0.7 MG/DL (ref 0.1–1.5)
BILIRUB SERPL-MCNC: 0.7 MG/DL (ref 0.1–1.5)
BILIRUB UR QL STRIP.AUTO: NEGATIVE
BODY TEMPERATURE: 36.2 CENTIGRADE
BUN SERPL-MCNC: 31 MG/DL (ref 8–22)
BUN SERPL-MCNC: 35 MG/DL (ref 8–22)
CALCIUM ALBUM COR SERPL-MCNC: 9.1 MG/DL (ref 8.5–10.5)
CALCIUM ALBUM COR SERPL-MCNC: 9.5 MG/DL (ref 8.5–10.5)
CALCIUM SERPL-MCNC: 10.3 MG/DL (ref 8.5–10.5)
CALCIUM SERPL-MCNC: 9.9 MG/DL (ref 8.5–10.5)
CARBAMAZEPINE SERPL-MCNC: 16 UG/ML (ref 4–12)
CARBAMAZEPINE SERPL-MCNC: 17 UG/ML (ref 4–12)
CHLORIDE SERPL-SCNC: 94 MMOL/L (ref 96–112)
CHLORIDE SERPL-SCNC: 95 MMOL/L (ref 96–112)
CK SERPL-CCNC: 431 U/L (ref 0–154)
CO2 SERPL-SCNC: 19 MMOL/L (ref 20–33)
CO2 SERPL-SCNC: 19 MMOL/L (ref 20–33)
COLOR UR: ABNORMAL
CREAT SERPL-MCNC: 3.48 MG/DL (ref 0.5–1.4)
CREAT SERPL-MCNC: 3.76 MG/DL (ref 0.5–1.4)
EKG IMPRESSION: NORMAL
EKG IMPRESSION: NORMAL
EOSINOPHIL # BLD AUTO: 0.01 K/UL (ref 0–0.51)
EOSINOPHIL # BLD AUTO: 0.04 K/UL (ref 0–0.51)
EOSINOPHIL NFR BLD: 0.1 % (ref 0–6.9)
EOSINOPHIL NFR BLD: 0.2 % (ref 0–6.9)
EPI CELLS #/AREA URNS HPF: ABNORMAL /HPF
ERYTHROCYTE [DISTWIDTH] IN BLOOD BY AUTOMATED COUNT: 42.4 FL (ref 35.9–50)
ERYTHROCYTE [DISTWIDTH] IN BLOOD BY AUTOMATED COUNT: 42.9 FL (ref 35.9–50)
GFR SERPLBLD CREATININE-BSD FMLA CKD-EPI: 16 ML/MIN/1.73 M 2
GFR SERPLBLD CREATININE-BSD FMLA CKD-EPI: 18 ML/MIN/1.73 M 2
GLOBULIN SER CALC-MCNC: 3.5 G/DL (ref 1.9–3.5)
GLOBULIN SER CALC-MCNC: 3.5 G/DL (ref 1.9–3.5)
GLUCOSE SERPL-MCNC: 123 MG/DL (ref 65–99)
GLUCOSE SERPL-MCNC: 126 MG/DL (ref 65–99)
GLUCOSE UR STRIP.AUTO-MCNC: NEGATIVE MG/DL
HCO3 BLDV-SCNC: 20 MMOL/L (ref 24–28)
HCT VFR BLD AUTO: 49.3 % (ref 42–52)
HCT VFR BLD AUTO: 49.6 % (ref 42–52)
HGB BLD-MCNC: 17.1 G/DL (ref 14–18)
HGB BLD-MCNC: 17.4 G/DL (ref 14–18)
HYALINE CASTS #/AREA URNS LPF: ABNORMAL /LPF
IMM GRANULOCYTES # BLD AUTO: 0.06 K/UL (ref 0–0.11)
IMM GRANULOCYTES # BLD AUTO: 0.07 K/UL (ref 0–0.11)
IMM GRANULOCYTES NFR BLD AUTO: 0.4 % (ref 0–0.9)
IMM GRANULOCYTES NFR BLD AUTO: 0.4 % (ref 0–0.9)
INHALED O2 FLOW RATE: ABNORMAL L/MIN
KETONES UR STRIP.AUTO-MCNC: ABNORMAL MG/DL
LACTATE SERPL-SCNC: 2.2 MMOL/L (ref 0.5–2)
LACTATE SERPL-SCNC: 2.2 MMOL/L (ref 0.5–2)
LEUKOCYTE ESTERASE UR QL STRIP.AUTO: NEGATIVE
LIPASE SERPL-CCNC: 26 U/L (ref 11–82)
LYMPHOCYTES # BLD AUTO: 1.42 K/UL (ref 1–4.8)
LYMPHOCYTES # BLD AUTO: 1.43 K/UL (ref 1–4.8)
LYMPHOCYTES NFR BLD: 10.5 % (ref 22–41)
LYMPHOCYTES NFR BLD: 8.7 % (ref 22–41)
MAGNESIUM SERPL-MCNC: 1.6 MG/DL (ref 1.5–2.5)
MAGNESIUM SERPL-MCNC: 1.6 MG/DL (ref 1.5–2.5)
MCH RBC QN AUTO: 30.4 PG (ref 27–33)
MCH RBC QN AUTO: 31.1 PG (ref 27–33)
MCHC RBC AUTO-ENTMCNC: 34.7 G/DL (ref 32.3–36.5)
MCHC RBC AUTO-ENTMCNC: 35.1 G/DL (ref 32.3–36.5)
MCV RBC AUTO: 87.6 FL (ref 81.4–97.8)
MCV RBC AUTO: 88.6 FL (ref 81.4–97.8)
MICRO URNS: ABNORMAL
MONOCYTES # BLD AUTO: 1 K/UL (ref 0–0.85)
MONOCYTES # BLD AUTO: 1.14 K/UL (ref 0–0.85)
MONOCYTES NFR BLD AUTO: 7 % (ref 0–13.4)
MONOCYTES NFR BLD AUTO: 7.3 % (ref 0–13.4)
MUCOUS THREADS #/AREA URNS HPF: ABNORMAL /HPF
NEUTROPHILS # BLD AUTO: 11.11 K/UL (ref 1.82–7.42)
NEUTROPHILS # BLD AUTO: 13.57 K/UL (ref 1.82–7.42)
NEUTROPHILS NFR BLD: 81.6 % (ref 44–72)
NEUTROPHILS NFR BLD: 83.6 % (ref 44–72)
NITRITE UR QL STRIP.AUTO: NEGATIVE
NRBC # BLD AUTO: 0 K/UL
NRBC # BLD AUTO: 0 K/UL
NRBC BLD-RTO: 0 /100 WBC (ref 0–0.2)
NRBC BLD-RTO: 0 /100 WBC (ref 0–0.2)
NT-PROBNP SERPL IA-MCNC: 8537 PG/ML (ref 0–125)
PCO2 BLDV: 31.2 MMHG (ref 41–51)
PH BLDV: 7.42 [PH] (ref 7.31–7.45)
PH UR STRIP.AUTO: 5 [PH] (ref 5–8)
PHOSPHATE SERPL-MCNC: 3.2 MG/DL (ref 2.5–4.5)
PLATELET # BLD AUTO: 188 K/UL (ref 164–446)
PLATELET # BLD AUTO: 192 K/UL (ref 164–446)
PMV BLD AUTO: 10.4 FL (ref 9–12.9)
PMV BLD AUTO: 10.6 FL (ref 9–12.9)
PO2 BLDV: 42 MMHG (ref 25–40)
POTASSIUM SERPL-SCNC: 3.6 MMOL/L (ref 3.6–5.5)
POTASSIUM SERPL-SCNC: 3.6 MMOL/L (ref 3.6–5.5)
PROLACTIN SERPL-MCNC: 21 NG/ML (ref 2.1–17.7)
PROT SERPL-MCNC: 8.5 G/DL (ref 6–8.2)
PROT SERPL-MCNC: 8.5 G/DL (ref 6–8.2)
PROT UR QL STRIP: 100 MG/DL
RBC # BLD AUTO: 5.6 M/UL (ref 4.7–6.1)
RBC # BLD AUTO: 5.63 M/UL (ref 4.7–6.1)
RBC # URNS HPF: ABNORMAL /HPF
RBC UR QL AUTO: NEGATIVE
SAO2 % BLDV: 74.3 %
SODIUM SERPL-SCNC: 136 MMOL/L (ref 135–145)
SODIUM SERPL-SCNC: 136 MMOL/L (ref 135–145)
SP GR UR STRIP.AUTO: 1.02
TROPONIN T SERPL-MCNC: 52 NG/L (ref 6–19)
TROPONIN T SERPL-MCNC: 54 NG/L (ref 6–19)
TSH SERPL DL<=0.005 MIU/L-ACNC: 1.54 UIU/ML (ref 0.38–5.33)
URATE SERPL-MCNC: 9.4 MG/DL (ref 2.5–8.3)
UROBILINOGEN UR STRIP.AUTO-MCNC: 0.2 MG/DL
WBC # BLD AUTO: 13.6 K/UL (ref 4.8–10.8)
WBC # BLD AUTO: 16.3 K/UL (ref 4.8–10.8)
WBC #/AREA URNS HPF: ABNORMAL /HPF

## 2024-05-25 PROCEDURE — A9270 NON-COVERED ITEM OR SERVICE: HCPCS | Performed by: INTERNAL MEDICINE

## 2024-05-25 PROCEDURE — 83880 ASSAY OF NATRIURETIC PEPTIDE: CPT

## 2024-05-25 PROCEDURE — 83690 ASSAY OF LIPASE: CPT

## 2024-05-25 PROCEDURE — 99285 EMERGENCY DEPT VISIT HI MDM: CPT

## 2024-05-25 PROCEDURE — 700101 HCHG RX REV CODE 250: Performed by: INTERNAL MEDICINE

## 2024-05-25 PROCEDURE — 83735 ASSAY OF MAGNESIUM: CPT | Mod: 91

## 2024-05-25 PROCEDURE — 82550 ASSAY OF CK (CPK): CPT

## 2024-05-25 PROCEDURE — 99223 1ST HOSP IP/OBS HIGH 75: CPT | Performed by: INTERNAL MEDICINE

## 2024-05-25 PROCEDURE — 80156 ASSAY CARBAMAZEPINE TOTAL: CPT | Mod: 91

## 2024-05-25 PROCEDURE — 72125 CT NECK SPINE W/O DYE: CPT

## 2024-05-25 PROCEDURE — 83735 ASSAY OF MAGNESIUM: CPT

## 2024-05-25 PROCEDURE — 85025 COMPLETE CBC W/AUTO DIFF WBC: CPT

## 2024-05-25 PROCEDURE — 36415 COLL VENOUS BLD VENIPUNCTURE: CPT

## 2024-05-25 PROCEDURE — 84443 ASSAY THYROID STIM HORMONE: CPT

## 2024-05-25 PROCEDURE — 83605 ASSAY OF LACTIC ACID: CPT | Mod: 91

## 2024-05-25 PROCEDURE — 76700 US EXAM ABDOM COMPLETE: CPT

## 2024-05-25 PROCEDURE — G0378 HOSPITAL OBSERVATION PER HR: HCPCS

## 2024-05-25 PROCEDURE — 700102 HCHG RX REV CODE 250 W/ 637 OVERRIDE(OP): Performed by: INTERNAL MEDICINE

## 2024-05-25 PROCEDURE — 96366 THER/PROPH/DIAG IV INF ADDON: CPT

## 2024-05-25 PROCEDURE — 96375 TX/PRO/DX INJ NEW DRUG ADDON: CPT

## 2024-05-25 PROCEDURE — 80156 ASSAY CARBAMAZEPINE TOTAL: CPT

## 2024-05-25 PROCEDURE — 84484 ASSAY OF TROPONIN QUANT: CPT

## 2024-05-25 PROCEDURE — 82803 BLOOD GASES ANY COMBINATION: CPT

## 2024-05-25 PROCEDURE — 84146 ASSAY OF PROLACTIN: CPT

## 2024-05-25 PROCEDURE — 85025 COMPLETE CBC W/AUTO DIFF WBC: CPT | Mod: 91

## 2024-05-25 PROCEDURE — 84484 ASSAY OF TROPONIN QUANT: CPT | Mod: 91

## 2024-05-25 PROCEDURE — 93005 ELECTROCARDIOGRAM TRACING: CPT | Performed by: EMERGENCY MEDICINE

## 2024-05-25 PROCEDURE — 83605 ASSAY OF LACTIC ACID: CPT

## 2024-05-25 PROCEDURE — 80053 COMPREHEN METABOLIC PANEL: CPT

## 2024-05-25 PROCEDURE — 81001 URINALYSIS AUTO W/SCOPE: CPT

## 2024-05-25 PROCEDURE — 70450 CT HEAD/BRAIN W/O DYE: CPT

## 2024-05-25 PROCEDURE — 80053 COMPREHEN METABOLIC PANEL: CPT | Mod: 91

## 2024-05-25 PROCEDURE — 700105 HCHG RX REV CODE 258: Performed by: STUDENT IN AN ORGANIZED HEALTH CARE EDUCATION/TRAINING PROGRAM

## 2024-05-25 PROCEDURE — 84100 ASSAY OF PHOSPHORUS: CPT

## 2024-05-25 PROCEDURE — 71045 X-RAY EXAM CHEST 1 VIEW: CPT

## 2024-05-25 PROCEDURE — 93005 ELECTROCARDIOGRAM TRACING: CPT | Performed by: STUDENT IN AN ORGANIZED HEALTH CARE EDUCATION/TRAINING PROGRAM

## 2024-05-25 PROCEDURE — 84550 ASSAY OF BLOOD/URIC ACID: CPT

## 2024-05-25 PROCEDURE — 700111 HCHG RX REV CODE 636 W/ 250 OVERRIDE (IP): Mod: JZ | Performed by: STUDENT IN AN ORGANIZED HEALTH CARE EDUCATION/TRAINING PROGRAM

## 2024-05-25 PROCEDURE — 96365 THER/PROPH/DIAG IV INF INIT: CPT

## 2024-05-25 RX ORDER — OXYCODONE HYDROCHLORIDE 5 MG/1
5 TABLET ORAL
Status: DISCONTINUED | OUTPATIENT
Start: 2024-05-25 | End: 2024-05-26 | Stop reason: HOSPADM

## 2024-05-25 RX ORDER — AMLODIPINE BESYLATE 10 MG/1
10 TABLET ORAL DAILY
Status: DISCONTINUED | OUTPATIENT
Start: 2024-05-26 | End: 2024-05-26 | Stop reason: HOSPADM

## 2024-05-25 RX ORDER — HYDRALAZINE HYDROCHLORIDE 20 MG/ML
10 INJECTION INTRAMUSCULAR; INTRAVENOUS EVERY 4 HOURS PRN
Status: DISCONTINUED | OUTPATIENT
Start: 2024-05-25 | End: 2024-05-26 | Stop reason: HOSPADM

## 2024-05-25 RX ORDER — ONDANSETRON 2 MG/ML
4 INJECTION INTRAMUSCULAR; INTRAVENOUS EVERY 4 HOURS PRN
Status: DISCONTINUED | OUTPATIENT
Start: 2024-05-25 | End: 2024-05-26 | Stop reason: HOSPADM

## 2024-05-25 RX ORDER — ESCITALOPRAM OXALATE 20 MG/1
20 TABLET ORAL DAILY
COMMUNITY

## 2024-05-25 RX ORDER — CALCITRIOL 0.25 UG/1
0.25 CAPSULE, LIQUID FILLED ORAL DAILY
Status: DISCONTINUED | OUTPATIENT
Start: 2024-05-26 | End: 2024-05-26 | Stop reason: HOSPADM

## 2024-05-25 RX ORDER — HEPARIN SODIUM 5000 [USP'U]/ML
5000 INJECTION, SOLUTION INTRAVENOUS; SUBCUTANEOUS EVERY 8 HOURS
Status: DISCONTINUED | OUTPATIENT
Start: 2024-05-25 | End: 2024-05-26 | Stop reason: HOSPADM

## 2024-05-25 RX ORDER — SODIUM CHLORIDE AND POTASSIUM CHLORIDE 150; 900 MG/100ML; MG/100ML
INJECTION, SOLUTION INTRAVENOUS CONTINUOUS
Status: DISCONTINUED | OUTPATIENT
Start: 2024-05-25 | End: 2024-05-26 | Stop reason: HOSPADM

## 2024-05-25 RX ORDER — HYDROMORPHONE HYDROCHLORIDE 1 MG/ML
0.25 INJECTION, SOLUTION INTRAMUSCULAR; INTRAVENOUS; SUBCUTANEOUS
Status: DISCONTINUED | OUTPATIENT
Start: 2024-05-25 | End: 2024-05-26 | Stop reason: HOSPADM

## 2024-05-25 RX ORDER — ONDANSETRON 4 MG/1
4 TABLET, ORALLY DISINTEGRATING ORAL EVERY 4 HOURS PRN
Status: DISCONTINUED | OUTPATIENT
Start: 2024-05-25 | End: 2024-05-26 | Stop reason: HOSPADM

## 2024-05-25 RX ORDER — POLYETHYLENE GLYCOL 3350 17 G/17G
1 POWDER, FOR SOLUTION ORAL
Status: DISCONTINUED | OUTPATIENT
Start: 2024-05-25 | End: 2024-05-26 | Stop reason: HOSPADM

## 2024-05-25 RX ORDER — ONDANSETRON 2 MG/ML
4 INJECTION INTRAMUSCULAR; INTRAVENOUS ONCE
Status: COMPLETED | OUTPATIENT
Start: 2024-05-25 | End: 2024-05-25

## 2024-05-25 RX ORDER — SODIUM BICARBONATE 650 MG/1
650 TABLET ORAL
Status: DISCONTINUED | OUTPATIENT
Start: 2024-05-26 | End: 2024-05-26 | Stop reason: HOSPADM

## 2024-05-25 RX ORDER — TAMSULOSIN HYDROCHLORIDE 0.4 MG/1
0.4 CAPSULE ORAL
Status: DISCONTINUED | OUTPATIENT
Start: 2024-05-26 | End: 2024-05-26 | Stop reason: HOSPADM

## 2024-05-25 RX ORDER — METOPROLOL SUCCINATE 25 MG/1
25 TABLET, EXTENDED RELEASE ORAL DAILY
Status: DISCONTINUED | OUTPATIENT
Start: 2024-05-26 | End: 2024-05-26 | Stop reason: HOSPADM

## 2024-05-25 RX ORDER — LOSARTAN POTASSIUM 100 MG/1
100 TABLET ORAL DAILY
COMMUNITY

## 2024-05-25 RX ORDER — METOPROLOL SUCCINATE 25 MG/1
25 TABLET, EXTENDED RELEASE ORAL DAILY
COMMUNITY

## 2024-05-25 RX ORDER — CALCIUM POLYCARBOPHIL 625 MG 625 MG/1
625 TABLET ORAL DAILY
COMMUNITY

## 2024-05-25 RX ORDER — QUETIAPINE FUMARATE 25 MG/1
25 TABLET, FILM COATED ORAL NIGHTLY
Status: DISCONTINUED | OUTPATIENT
Start: 2024-05-25 | End: 2024-05-26 | Stop reason: HOSPADM

## 2024-05-25 RX ORDER — OXYCODONE HYDROCHLORIDE 5 MG/1
2.5 TABLET ORAL
Status: DISCONTINUED | OUTPATIENT
Start: 2024-05-25 | End: 2024-05-26 | Stop reason: HOSPADM

## 2024-05-25 RX ORDER — SODIUM CHLORIDE 9 MG/ML
1000 INJECTION, SOLUTION INTRAVENOUS ONCE
Status: COMPLETED | OUTPATIENT
Start: 2024-05-25 | End: 2024-05-25

## 2024-05-25 RX ORDER — AMOXICILLIN 250 MG
2 CAPSULE ORAL EVERY EVENING
Status: DISCONTINUED | OUTPATIENT
Start: 2024-05-25 | End: 2024-05-26 | Stop reason: HOSPADM

## 2024-05-25 RX ORDER — MAGNESIUM SULFATE HEPTAHYDRATE 40 MG/ML
2 INJECTION, SOLUTION INTRAVENOUS ONCE
Status: COMPLETED | OUTPATIENT
Start: 2024-05-25 | End: 2024-05-25

## 2024-05-25 RX ORDER — FOLIC ACID 1 MG/1
1 TABLET ORAL DAILY
COMMUNITY

## 2024-05-25 RX ORDER — FOLIC ACID 1 MG/1
1 TABLET ORAL DAILY
Status: DISCONTINUED | OUTPATIENT
Start: 2024-05-26 | End: 2024-05-26 | Stop reason: HOSPADM

## 2024-05-25 RX ORDER — ESCITALOPRAM OXALATE 10 MG/1
20 TABLET ORAL DAILY
Status: DISCONTINUED | OUTPATIENT
Start: 2024-05-26 | End: 2024-05-26 | Stop reason: HOSPADM

## 2024-05-25 RX ORDER — ACETAMINOPHEN 325 MG/1
650 TABLET ORAL EVERY 6 HOURS PRN
Status: DISCONTINUED | OUTPATIENT
Start: 2024-05-25 | End: 2024-05-26 | Stop reason: HOSPADM

## 2024-05-25 RX ORDER — CARBAMAZEPINE 200 MG/1
200-400 TABLET ORAL 2 TIMES DAILY
Status: DISCONTINUED | OUTPATIENT
Start: 2024-05-25 | End: 2024-05-26

## 2024-05-25 RX ADMIN — ATORVASTATIN CALCIUM 60 MG: 40 TABLET, FILM COATED ORAL at 22:54

## 2024-05-25 RX ADMIN — ONDANSETRON 4 MG: 2 INJECTION INTRAMUSCULAR; INTRAVENOUS at 17:17

## 2024-05-25 RX ADMIN — MAGNESIUM SULFATE HEPTAHYDRATE 2 G: 2 INJECTION, SOLUTION INTRAVENOUS at 19:06

## 2024-05-25 RX ADMIN — SODIUM CHLORIDE 1000 ML: 9 INJECTION, SOLUTION INTRAVENOUS at 17:20

## 2024-05-25 RX ADMIN — POTASSIUM CHLORIDE AND SODIUM CHLORIDE: 900; 150 INJECTION, SOLUTION INTRAVENOUS at 22:51

## 2024-05-25 SDOH — ECONOMIC STABILITY: TRANSPORTATION INSECURITY
IN THE PAST 12 MONTHS, HAS LACK OF RELIABLE TRANSPORTATION KEPT YOU FROM MEDICAL APPOINTMENTS, MEETINGS, WORK OR FROM GETTING THINGS NEEDED FOR DAILY LIVING?: NO

## 2024-05-25 SDOH — ECONOMIC STABILITY: TRANSPORTATION INSECURITY
IN THE PAST 12 MONTHS, HAS THE LACK OF TRANSPORTATION KEPT YOU FROM MEDICAL APPOINTMENTS OR FROM GETTING MEDICATIONS?: NO

## 2024-05-25 ASSESSMENT — LIFESTYLE VARIABLES
TOTAL SCORE: 0
HOW MANY TIMES IN THE PAST YEAR HAVE YOU HAD 5 OR MORE DRINKS IN A DAY: 0
AVERAGE NUMBER OF DAYS PER WEEK YOU HAVE A DRINK CONTAINING ALCOHOL: 0
EVER HAD A DRINK FIRST THING IN THE MORNING TO STEADY YOUR NERVES TO GET RID OF A HANGOVER: NO
CONSUMPTION TOTAL: NEGATIVE
HAVE PEOPLE ANNOYED YOU BY CRITICIZING YOUR DRINKING: NO
TOTAL SCORE: 0
TOTAL SCORE: 0
ALCOHOL_USE: NO
HAVE YOU EVER FELT YOU SHOULD CUT DOWN ON YOUR DRINKING: NO
ON A TYPICAL DAY WHEN YOU DRINK ALCOHOL HOW MANY DRINKS DO YOU HAVE: 0
EVER FELT BAD OR GUILTY ABOUT YOUR DRINKING: NO

## 2024-05-25 ASSESSMENT — SOCIAL DETERMINANTS OF HEALTH (SDOH)
WITHIN THE PAST 12 MONTHS, YOU WORRIED THAT YOUR FOOD WOULD RUN OUT BEFORE YOU GOT THE MONEY TO BUY MORE: NEVER TRUE
WITHIN THE LAST YEAR, HAVE YOU BEEN AFRAID OF YOUR PARTNER OR EX-PARTNER?: NO
WITHIN THE LAST YEAR, HAVE YOU BEEN HUMILIATED OR EMOTIONALLY ABUSED IN OTHER WAYS BY YOUR PARTNER OR EX-PARTNER?: NO
WITHIN THE PAST 12 MONTHS, THE FOOD YOU BOUGHT JUST DIDN'T LAST AND YOU DIDN'T HAVE MONEY TO GET MORE: NEVER TRUE
IN THE PAST 12 MONTHS, HAS THE ELECTRIC, GAS, OIL, OR WATER COMPANY THREATENED TO SHUT OFF SERVICE IN YOUR HOME?: NO
WITHIN THE LAST YEAR, HAVE TO BEEN RAPED OR FORCED TO HAVE ANY KIND OF SEXUAL ACTIVITY BY YOUR PARTNER OR EX-PARTNER?: NO
WITHIN THE LAST YEAR, HAVE YOU BEEN KICKED, HIT, SLAPPED, OR OTHERWISE PHYSICALLY HURT BY YOUR PARTNER OR EX-PARTNER?: NO

## 2024-05-25 ASSESSMENT — FIBROSIS 4 INDEX
FIB4 SCORE: 1.69
FIB4 SCORE: 2.01
FIB4 SCORE: 1.69

## 2024-05-25 ASSESSMENT — PAIN DESCRIPTION - PAIN TYPE: TYPE: ACUTE PAIN

## 2024-05-25 NOTE — ED PROVIDER NOTES
"ED Provider Note    CHIEF COMPLAINT  Chief Complaint   Patient presents with    Seizure     Pt reports having a \"small seizure\"  Hx: seizure    N/V     Since 5/24    T-5000 Head Injury     While pt had a seizure pt fell and struck his left temple.        HPI  Roby Viramontes II is a 68 y.o. male who presents for evaluation of a head injury after seizure.  Patient has known seizures and is taking Tegretol but apparently had a breakthrough seizure and struck his left frontal scalp after the seizure began.  It lasted less than a minute and the patient self recovered.  He is calm, conversant, and denies any pain or distress.  He was seen and evaluated at the charge as per TBI protocol.  He will be taken for CT imaging of his head and cervical spine.  EXTERNAL RECORDS REVIEWED  Reviewed office visit to PCP April 26, 2024.  Patient has a history of temporal lobe epilepsy with long-term use of anticonvulsant, cognitive impairment.  ROS  Constitutional: No recent fevers or chills  Skin: Small abrasion/bruising to left frontal scalp  HEENT: No sore throat, or runny nose  Neck: No neck pain  Chest: No pain or rashes  Pulm: No shortness of breath, cough, wheezing, stridor, or pain with inspiration/expiration  Gastrointestinal: No nausea, vomiting, diarrhea, constipation, bloating, melena, hematochezia or abdominal pain.  Genitourinary: No dysuria or hematuria  Musculoskeletal: No pain, swelling, or focal weakness  Neurologic: No sensory or focal motor changes to extremities. No confusion or disorientation.  Heme: No bleeding or bruising problems.   Immuno: No hx of recurrent infections        LIMITATION TO HISTORY   None  OUTSIDE HISTORIAN(S):  None        PAST FAM HISTORY  No family history on file.    PAST MEDICAL HISTORY   has a past medical history of CKD (chronic kidney disease) stage 4, GFR 15-29 ml/min ([facility]) (6/22/2021), Dyslipidemia, Hypertension, Hypophosphatemia, Psychiatric problem, and Seizure disorder " "(Formerly [facility]).    SOCIAL HISTORY  Social History     Tobacco Use    Smoking status: Former     Current packs/day: 0.00     Types: Cigarettes     Quit date: 1990     Years since quittin.4    Smokeless tobacco: Never    Tobacco comments:     current alton smoker    Vaping Use    Vaping status: Never Used   Substance and Sexual Activity    Alcohol use: Not Currently    Drug use: Not Currently    Sexual activity: Not on file       SURGICAL HISTORY   has a past surgical history that includes gastroscopy-endo (7/10/2020).    CURRENT MEDICATIONS  Home Medications       Reviewed by Jayce Subramanian R.N. (Registered Nurse) on 24 at 1047  Med List Status: Not Addressed     Medication Last Dose Status   amLODIPine (NORVASC) 10 MG Tab  Active   atorvastatin (LIPITOR) 40 MG Tab  Active   calcitRIOL (ROCALTROL) 0.25 MCG Cap  Active   carBAMazepine (TEGRETOL) 200 MG Tab  Active   escitalopram (LEXAPRO) 10 MG Tab  Active   loperamide (IMODIUM) 2 MG Cap  Active   omeprazole (PRILOSEC) 40 MG delayed-release capsule  Active   QUEtiapine (SEROQUEL) 25 MG Tab  Active   tamsulosin (FLOMAX) 0.4 MG capsule  Active                  Audit from Redirected Encounters    **Home medications have not yet been reviewed for this encounter**          ALLERGIES  No Known Allergies    PHYSICAL EXAM  VITAL SIGNS: BP (!) 150/85   Pulse 61   Temp 36.6 °C (97.9 °F) (Temporal)   Resp (!) 11   Ht 1.803 m (5' 11\")   Wt 74.3 kg (163 lb 12.8 oz)   SpO2 98%   BMI 22.85 kg/m²    Gen: Alert in no apparent distress.  HEENT: Faint abrasion left frontal scalp, Bilateral external ears normal, Nose normal. Conjunctiva normal, Non-icteric.  PERRLA, EOMI  Neck:  No tenderness, Supple, No masses  Cardiovascular: Regular rate and rhythm, no murmurs.  Capillary refill less than 3 seconds to all extremities, 2+ distal pulses.  Thorax & Lungs: Normal breath sounds, No respiratory distress, No wheezing bilateral chest rise  Abdomen: Bowel sounds normal, Soft, " No tenderness, No masses, No pulsatile masses. No Guarding or rebound  Skin: Warm, Dry, No erythema, No rash noted to exposed areas.   Extremities: Intact distal pulses, No edema  Neurologic: Alert , no facial droop, grossly normal coordination and strength  Psychiatric: Affect pleasant    INITIAL IMPRESSION  Patient arrives for evaluation of what appears to be a minor head injury during a seizure.  He has recovered fully terms of his seizure and does not appear postictal at the charge desk.  Regardless, he will get imaging of his head and cervical spine while laboratory evaluation is undertaken.  This will include a Tegretol level.    ED observation? No    LABS  Results for orders placed or performed during the hospital encounter of 05/25/24   CBC WITH DIFFERENTIAL   Result Value Ref Range    WBC 16.3 (H) 4.8 - 10.8 K/uL    RBC 5.60 4.70 - 6.10 M/uL    Hemoglobin 17.4 14.0 - 18.0 g/dL    Hematocrit 49.6 42.0 - 52.0 %    MCV 88.6 81.4 - 97.8 fL    MCH 31.1 27.0 - 33.0 pg    MCHC 35.1 32.3 - 36.5 g/dL    RDW 42.9 35.9 - 50.0 fL    Platelet Count 192 164 - 446 K/uL    MPV 10.6 9.0 - 12.9 fL    Neutrophils-Polys 83.60 (H) 44.00 - 72.00 %    Lymphocytes 8.70 (L) 22.00 - 41.00 %    Monocytes 7.00 0.00 - 13.40 %    Eosinophils 0.20 0.00 - 6.90 %    Basophils 0.10 0.00 - 1.80 %    Immature Granulocytes 0.40 0.00 - 0.90 %    Nucleated RBC 0.00 0.00 - 0.20 /100 WBC    Neutrophils (Absolute) 13.57 (H) 1.82 - 7.42 K/uL    Lymphs (Absolute) 1.42 1.00 - 4.80 K/uL    Monos (Absolute) 1.14 (H) 0.00 - 0.85 K/uL    Eos (Absolute) 0.04 0.00 - 0.51 K/uL    Baso (Absolute) 0.02 0.00 - 0.12 K/uL    Immature Granulocytes (abs) 0.07 0.00 - 0.11 K/uL    NRBC (Absolute) 0.00 K/uL   COMP METABOLIC PANEL   Result Value Ref Range    Sodium 136 135 - 145 mmol/L    Potassium 3.6 3.6 - 5.5 mmol/L    Chloride 95 (L) 96 - 112 mmol/L    Co2 19 (L) 20 - 33 mmol/L    Anion Gap 22.0 (H) 7.0 - 16.0    Glucose 126 (H) 65 - 99 mg/dL    Bun 31 (H) 8 - 22  mg/dL    Creatinine 3.48 (H) 0.50 - 1.40 mg/dL    Calcium 10.3 8.5 - 10.5 mg/dL    Correct Calcium 9.5 8.5 - 10.5 mg/dL    AST(SGOT) 18 12 - 45 U/L    ALT(SGPT) 15 2 - 50 U/L    Alkaline Phosphatase 137 (H) 30 - 99 U/L    Total Bilirubin 0.7 0.1 - 1.5 mg/dL    Albumin 5.0 (H) 3.2 - 4.9 g/dL    Total Protein 8.5 (H) 6.0 - 8.2 g/dL    Globulin 3.5 1.9 - 3.5 g/dL    A-G Ratio 1.4 g/dL   MAGNESIUM   Result Value Ref Range    Magnesium 1.6 1.5 - 2.5 mg/dL   TROPONIN   Result Value Ref Range    Troponin T 52 (H) 6 - 19 ng/L   LACTIC ACID   Result Value Ref Range    Lactic Acid 2.2 (H) 0.5 - 2.0 mmol/L   CARBAMAZEPINE   Result Value Ref Range    Carbamazepine 17.0 (HH) 4.0 - 12.0 ug/mL   ESTIMATED GFR   Result Value Ref Range    GFR (CKD-EPI) 18 (A) >60 mL/min/1.73 m 2   EKG (NOW)   Result Value Ref Range    Report       [facility] Emergency Dept.    Test Date:  2024  Pt Name:    BRISEYDA CHAUHAN            Department: ER  MRN:        [id number]                      Room:       [facility]  Gender:     Male                         Technician: [id number]  :        1955                   Requested By:GONZALEZ HERRERA  Order #:    [id number]                    Reading MD:    Measurements  Intervals                                Axis  Rate:       55                           P:          66  NH:         198                          QRS:        21  QRSD:       100                          T:          74  QT:         565  QTc:        541    Interpretive Statements  Sinus bradycardia  Probable left atrial enlargement  Borderline ST depression, anterolateral leads  Prolonged QT interval  Compared to ECG 2023 14:39:31  Prolonged QT interval now present  Sinus rhythm no longer present  ST (T wave) deviation still present       I have independently interpreted this EKG  Sinus bradycardia rate of 55, QTc is prolonged at 541, there are no convincing ST or T wave changes to suggest acute ischemia.  Compared to  EKG performed in December 2023, there do not appear to be any significant changes.    RADIOLOGY  CT-CSPINE WITHOUT PLUS RECONS   Final Result      1.  There is no acute fracture of the cervical spine.         CT-HEAD W/O   Final Result      1.  No acute intracranial process.   2.  Stable postoperative changes with left temporal encephalomalacia/partial lobectomy again seen.                   ASSESSMENT, COURSE AND PLAN  Care Narrative: Patient arrives after what appears to be a breakthrough seizure.  His Tegretol level was supratherapeutic and he will need to back off to his previous dose of 200 mg twice a day until he can speak with his primary neurologist.  I did not feel emergent neurology consultation was necessary as he is not in status epilepticus and I feel this is best reserved for the primary neurologist who knows him well.  Notable this may include medication changes as his elevated Tegretol level is likely a result of increasing his Tegretol dose after his last seizure last month.  He did have notable lab abnormalities including a troponin of 52.  I did review his 2 most recent troponins, the last 2 readings from last year were 88 and 110 respectively.  I suspect that the troponin level today is his baseline and I do not feel repeating the troponin or inpatient ACS workup is necessary given that he has absolutely no symptoms to suggest this problem.  Likewise, he has chronic renal disease which appears to be around his baseline as well.  His leukocytosis, lactate elevation and mild lactic acidosis were also very likely the result of the seizure and should normalize with time.  I do not suspect an infectious process/sepsis as he has no other symptoms or findings to suggest this issue.  He did not have any significant trauma and did not experience any deterioration.  Upon reevaluation around 1:15 PM, the patient was calm, conversant, and in no distress.  He had no complaints at that time and he states  understanding that I felt it safe to forego the urinalysis ordered initially.  He does not have any symptoms of urinary tract infection and he felt comfortable this plan.  Patient will follow-up with his primary neurologist and his family member who accompanies him, notes that they have additionally moved him to a different room where he now has assisted living.  He will return if his symptoms worsen or change in any way and otherwise follow-up with his primary care physician/neurologist.            ADDITIONAL PROBLEMS MANAGED      1. Seizure (HCC)    2. Elevated Tegretol level    3. Closed head injury, initial encounter         I have discussed management of the patient with the following physicians and ROSA's:  none    Escalation of care considered, and ultimately not performed: Trauma consultation, neurology consultation    Barriers to care at this time, including but not limited to: None.     Decision tools and Rx drugs considered including, but not limited to : Considered antiepileptics    Discussion of management with other [facility] or appropriate source(s): None    The patient will return for worsening symptoms and is stable at the time of discharge. The patient verbalizes understanding and will comply.    FINAL IMPRESSION  1. Seizure (HCC)    2. Elevated Tegretol level    3. Closed head injury, initial encounter        Electronically signed by: Rasta Pope M.D., 5/25/2024 10:56 AM

## 2024-05-25 NOTE — DISCHARGE INSTRUCTIONS
Your Tegretol level was elevated.  You will need to go back down to your old dose of 200 mg twice a day until you speak with your neurologist.

## 2024-05-25 NOTE — ED NOTES
Discharge instructions given to pt. Prescriptions unchanged. Pt educated, verbalizes understanding. All belongings accounted for. Pt wheeled out of ED with family at side to go home. PIV removed and dressing applied.

## 2024-05-25 NOTE — ED PROVIDER NOTES
ER Provider Note    Scribed for Renuka Mak M.D. by Berry Collins. 5/25/2024   4:44 PM    Primary Care Provider: Anna Barrera P.A.-C.    CHIEF COMPLAINT  Chief Complaint   Patient presents with    T-5000 Head Injury     Pt fell into his fridge per family. Seen here earlier today for same issue.     Seizure     History of seizures.      Reviewed ED note from earlier today, patient is seizure-like episode, he had screening labs and EKG, which showed elevation of his creatinine and BUN from baseline, CT head and C-spine are negative.    HPI/ROS  LIMITATION TO HISTORY   Select: : None  OUTSIDE HISTORIAN(S):  Family Son provided most of the history as seen below.    Roby Viramontes II is a 68 y.o. male who presents to the ED for evaluation of a possible seizure onset one hour ago. The patient's son reports that he had been discharged from the ED earlier today, but fell afterwards. He notes that the patient got out of bed and fell backwards, then started to vomit when he was put back in bed. His son is unsure if he lost consciousness, but denies a head strike. Denies abdominal pain, but notes that he had burning while urinating and more frequent urination 2 days ago. Has associated symptoms of decreased appetite, weakness and shaking, but denies coughing. Patient adds that he has not eaten in 3 days. The son reports that his last seizure was in April. Denies using blood thinners, confirms a history of kidney issues.    PAST MEDICAL HISTORY  Past Medical History:   Diagnosis Date    CKD (chronic kidney disease) stage 4, GFR 15-29 ml/min ([facility]) 6/22/2021    Dyslipidemia     Fall at home, initial encounter 5/25/2024    Hypertension     Hypophosphatemia     Psychiatric problem     ADHD/ depression    Seizure disorder ([facility])      SURGICAL HISTORY  Past Surgical History:   Procedure Laterality Date    GASTROSCOPY-ENDO  7/10/2020    Procedure: GASTROSCOPY;  Surgeon: Chad Yen M.D.;  Location: SURGERY  "[facility];  Service: Gastroenterology     FAMILY HISTORY  No family history noted.    SOCIAL HISTORY   reports that he quit smoking about 34 years ago. His smoking use included cigarettes. He has never used smokeless tobacco. He reports that he does not currently use alcohol. He reports that he does not currently use drugs.    CURRENT MEDICATIONS  Current Discharge Medication List        CONTINUE these medications which have NOT CHANGED    Details   folic acid (FOLVITE) 1 MG Tab Take 1 mg by mouth every day.      metoprolol SR (TOPROL XL) 25 MG TABLET SR 24 HR Take 25 mg by mouth every day.      escitalopram (LEXAPRO) 20 MG tablet Take 20 mg by mouth every day.      calcium polycarbophil (FIBERCON) 625 MG Tab Take 625 mg by mouth every day.      losartan (COZAAR) 100 MG Tab Take 100 mg by mouth every day.      tamsulosin (FLOMAX) 0.4 MG capsule Take 1 Capsule by mouth 1/2 hour after breakfast.  Qty: 30 Capsule, Refills: 0      calcitRIOL (ROCALTROL) 0.25 MCG Cap Take 0.25 mcg by mouth every day.      QUEtiapine (SEROQUEL) 25 MG Tab Take 25 mg by mouth every evening.      carBAMazepine (TEGRETOL) 200 MG Tab Take 200-400 mg by mouth 2 times a day. 200 mg = am  400 mg = evening      amLODIPine (NORVASC) 10 MG Tab Take 10 mg by mouth every day.      atorvastatin (LIPITOR) 40 MG Tab Take 60 mg by mouth every evening. 1.5 tablets = 60 mg           ALLERGIES  No Known Allergies     PHYSICAL EXAM  BP 97/69   Pulse 66   Temp 36.2 °C (97.2 °F) (Temporal)   Resp 18   Ht 1.803 m (5' 11\")   Wt 74.3 kg (163 lb 12.8 oz)   SpO2 98%   BMI 22.85 kg/m²    General: Chronically ill appearing, Thin  Head: Abrasion over left frontal bone, No occipital hematoma or abrasion.  Eyes: Extraocular motion intact  Neck: Supple, no rigidity no C-spine tenderness  Cardiovascular: Regular rate and rhythm no murmurs rubs or gallops, Decreased capillary refill of 3-4 seconds.  Respiratory: Clear to auscultation bilaterally, equal chest " rise and fall, no increased work of breathing  Abdomen: Soft nontender no guarding  Musculoskeletal: Warm and well perfused, no peripheral edema, No C-spine, T-spine, L-spine, or midline tenderness, No tenderness over ribcage or flanks.  Neuro: Alert, no focal deficits, 5/5 upper and lower extremity strength bilaterally  Integumentary: No wounds or rashes    DIAGNOSTIC STUDIES    Labs:   Labs Reviewed   CBC WITH DIFFERENTIAL - Abnormal; Notable for the following components:       Result Value    WBC 13.6 (*)     Neutrophils-Polys 81.60 (*)     Lymphocytes 10.50 (*)     Neutrophils (Absolute) 11.11 (*)     Monos (Absolute) 1.00 (*)     All other components within normal limits   COMP METABOLIC PANEL - Abnormal; Notable for the following components:    Chloride 94 (*)     Co2 19 (*)     Anion Gap 23.0 (*)     Glucose 123 (*)     Bun 35 (*)     Creatinine 3.76 (*)     Alkaline Phosphatase 139 (*)     Albumin 5.0 (*)     Total Protein 8.5 (*)     All other components within normal limits   TROPONIN - Abnormal; Notable for the following components:    Troponin T 54 (*)     All other components within normal limits   PROBRAIN NATRIURETIC PEPTIDE, NT - Abnormal; Notable for the following components:    NT-proBNP 8537 (*)     All other components within normal limits   URINALYSIS - Abnormal; Notable for the following components:    Character Cloudy (*)     Ketones Trace (*)     Protein 100 (*)     All other components within normal limits   CARBAMAZEPINE - Abnormal; Notable for the following components:    Carbamazepine 16.0 (*)     All other components within normal limits   LACTIC ACID - Abnormal; Notable for the following components:    Lactic Acid 2.2 (*)     All other components within normal limits   VENOUS BLOOD GAS - Abnormal; Notable for the following components:    Venous Bg Pco2 31.2 (*)     Venous Bg Po2 42.0 (*)     Venous Bg Hco3 20 (*)     All other components within normal limits   ESTIMATED GFR - Abnormal;  Notable for the following components:    GFR (CKD-EPI) 16 (*)     All other components within normal limits   URINE MICROSCOPIC (W/UA) - Abnormal; Notable for the following components:    WBC 2-5 (*)     RBC 2-5 (*)     Bacteria Moderate (*)     Hyaline Cast 3-5 (*)     All other components within normal limits   CREATINE KINASE - Abnormal; Notable for the following components:    CPK Total 431 (*)     All other components within normal limits   URIC ACID - Abnormal; Notable for the following components:    Uric Acid 9.4 (*)     All other components within normal limits   PROLACTIN - Abnormal; Notable for the following components:    Prolactin 21.00 (*)     All other components within normal limits   LIPASE   MAGNESIUM   PHOSPHORUS   TSH WITH REFLEX TO FT4   URINE SODIUM RANDOM   URINE CREATININE RANDOM   PHOSPHORUS   URINE DRUG SCREEN   VITAMIN 1,25 DIHYDROXY   CBC WITH DIFFERENTIAL   COMP METABOLIC PANEL   CARBAMAZEPINE       EKG:   I have independently interpreted this EKG as detailed above.  Results for orders placed or performed during the hospital encounter of 24   EKG   Result Value Ref Range    Report       [facility] Emergency Dept.    Test Date:  2024  Pt Name:    BRISEYDA CHAUHAN            Department: ER  MRN:        [id number]                      Room:       [facility]  Gender:     Male                         Technician: [id number]  :        1955                   Requested By:NGUYỄN SINGH  Order #:    [id number]                    Reading MD:    Measurements  Intervals                                Axis  Rate:       58                           P:          44  NJ:         193                          QRS:        17  QRSD:       114                          T:          27  QT:         560  QTc:        551    Interpretive Statements  Sinus bradycardia  Probable left atrial enlargement  Borderline intraventricular conduction delay  Borderline ST depression, anterolateral  leads  Prolonged QT interval  Compared to ECG 05/25/2024 11:15:42  No significant changes            Radiology:   This attending emergency physician has independently interpreted the diagnostic imaging associated with this visit and is awaiting the final reading from the radiologist.   Preliminary interpretation is a follows:     Radiologist interpretation:   US-ABDOMEN COMPLETE SURVEY   Final Result         1. Echogenic bilateral kidneys could relate to medical renal disease.   2. No gallstone. No biliary dilatation.            DX-CHEST-PORTABLE (1 VIEW)   Final Result      No acute cardiopulmonary disease.         INITIAL ASSESSMENT COURSE AND PLAN  Care Narrative 68-year-old male presenting with multiple falls today, possible syncopal episode versus additional seizure after he was seen earlier today for seizure with a known seizure disorder history.  Differential diagnoses include but not limited to: Dehydration vs. Infection vs. Electrolyte abnormality. Possibly arrhythmia or a medication side effect.    4:59 PM - Patient was reevaluated at bedside. His son reports that the patient and his nephrologist Dr. Cota has been discussing and planning for dialysis and has discussed a fistula for worsening renal failure. He notes that they are most interested in peritoneal dialysis. Ordered Estimated GFR and Venous Blood Gas at this time.    6:20 PM - I reevaluated the patient at bedside. The patient informs me they feel improved following medication administration.  Given patient's SHANA on CKD, supratherapeutic carbamazepine levels, multiple falls and weakness, will admit for observation, suspect patient is overall dehydrated given delayed capillary refill, and reported poor p.o. intake for the past 2 days, proBNP is mildly elevated in the setting of CKD.    9:42 PM - I discussed the patient's case and the above findings with Dr. Saini (Hospitalist) who agreed to hospitalize the patient. Patient's care was  transferred at this time.  I updated the patient on the plan of care.    Hydration: Based on the patient's presentation of Dehydration the patient was given IV fluids. IV Hydration was used because oral hydration was not adequate alone. Upon recheck following hydration, the patient was improved.        DISPOSITION AND DISCUSSIONS    I have discussed management of the patient with the following physicians and ROSA's:  Dr. Saini (Hospitalist)    Discussion of management with other [facility] or appropriate source(s): None     Escalation of care considered, and ultimately not performed: diagnostic imaging.  I considered repeat CT head, however patient is neurologically at baseline, with minimal headache, low suspicion for breakthrough bleed.    Barriers to care at this time, including but not limited to:  No known barriers .       DISPOSITION:  Patient will be hospitalized by Dr. Saini (Hospitalist) in guarded condition.    FINAL DIAGNOSIS  1. Weakness    2. SHANA (acute kidney injury) (HCC)       Berry RAMIREZ (Scribe), am scribing for, and in the presence of, Chase Mak M.D..    Electronically signed by: Berry Collins (Scribe), 5/25/2024    Chase RAMIREZ M.D. personally performed the services described in this documentation, as scribed by Berry Collins in my presence, and it is both accurate and complete.      The note accurately reflects work and decisions made by me.  Chase Mak M.D.  5/25/2024  11:45 PM

## 2024-05-25 NOTE — ED TRIAGE NOTES
"[facility]  68 y.o. male  Chief Complaint   Patient presents with    Seizure     Pt reports having a \"small seizure\"  Hx: seizure    N/V     Since 5/24    T-5000 Head Injury     While pt had a seizure pt fell and struck his left temple.        Pt amb to triage with steady gait for above complaint.   Pt is alert and oriented, speaking in full sentences, follows commands and responds appropriately to questions. Not in any apparent distress. Respirations are even and unlabored.  TBI initiated. Pt taken to charge desk.    "

## 2024-05-26 VITALS
BODY MASS INDEX: 23.24 KG/M2 | DIASTOLIC BLOOD PRESSURE: 65 MMHG | HEART RATE: 65 BPM | SYSTOLIC BLOOD PRESSURE: 145 MMHG | RESPIRATION RATE: 16 BRPM | TEMPERATURE: 98 F | OXYGEN SATURATION: 99 % | WEIGHT: 166.01 LBS | HEIGHT: 71 IN

## 2024-05-26 PROBLEM — R13.10 DYSPHAGIA: Status: RESOLVED | Noted: 2024-05-25 | Resolved: 2024-05-26

## 2024-05-26 PROBLEM — F12.288 CANNABIS HYPEREMESIS SYNDROME CONCURRENT WITH AND DUE TO CANNABIS DEPENDENCE (HCC): Status: RESOLVED | Noted: 2019-09-26 | Resolved: 2024-05-26

## 2024-05-26 PROBLEM — N18.5 CKD (CHRONIC KIDNEY DISEASE) STAGE 5, GFR LESS THAN 15 ML/MIN (HCC): Status: RESOLVED | Noted: 2023-12-13 | Resolved: 2024-05-26

## 2024-05-26 PROBLEM — T42.1X1A: Status: RESOLVED | Noted: 2024-05-25 | Resolved: 2024-05-26

## 2024-05-26 LAB
ALBUMIN SERPL BCP-MCNC: 4.4 G/DL (ref 3.2–4.9)
ALBUMIN/GLOB SERPL: 1.5 G/DL
ALP SERPL-CCNC: 116 U/L (ref 30–99)
ALT SERPL-CCNC: 12 U/L (ref 2–50)
AMPHET UR QL SCN: NEGATIVE
ANION GAP SERPL CALC-SCNC: 21 MMOL/L (ref 7–16)
AST SERPL-CCNC: 13 U/L (ref 12–45)
BARBITURATES UR QL SCN: NEGATIVE
BASOPHILS # BLD AUTO: 0.1 % (ref 0–1.8)
BASOPHILS # BLD: 0.01 K/UL (ref 0–0.12)
BENZODIAZ UR QL SCN: NEGATIVE
BILIRUB SERPL-MCNC: 0.5 MG/DL (ref 0.1–1.5)
BUN SERPL-MCNC: 36 MG/DL (ref 8–22)
BZE UR QL SCN: NEGATIVE
CALCIUM ALBUM COR SERPL-MCNC: 8.7 MG/DL (ref 8.5–10.5)
CALCIUM SERPL-MCNC: 9 MG/DL (ref 8.5–10.5)
CANNABINOIDS UR QL SCN: POSITIVE
CARBAMAZEPINE SERPL-MCNC: 11 UG/ML (ref 4–12)
CHLORIDE SERPL-SCNC: 99 MMOL/L (ref 96–112)
CO2 SERPL-SCNC: 17 MMOL/L (ref 20–33)
CREAT SERPL-MCNC: 3.08 MG/DL (ref 0.5–1.4)
CREAT UR-MCNC: 167.93 MG/DL
EOSINOPHIL # BLD AUTO: 0.01 K/UL (ref 0–0.51)
EOSINOPHIL NFR BLD: 0.1 % (ref 0–6.9)
ERYTHROCYTE [DISTWIDTH] IN BLOOD BY AUTOMATED COUNT: 41.9 FL (ref 35.9–50)
FENTANYL UR QL: NEGATIVE
GFR SERPLBLD CREATININE-BSD FMLA CKD-EPI: 21 ML/MIN/1.73 M 2
GLOBULIN SER CALC-MCNC: 2.9 G/DL (ref 1.9–3.5)
GLUCOSE SERPL-MCNC: 98 MG/DL (ref 65–99)
HCT VFR BLD AUTO: 46.9 % (ref 42–52)
HGB BLD-MCNC: 16.5 G/DL (ref 14–18)
IMM GRANULOCYTES # BLD AUTO: 0.04 K/UL (ref 0–0.11)
IMM GRANULOCYTES NFR BLD AUTO: 0.4 % (ref 0–0.9)
LYMPHOCYTES # BLD AUTO: 1.51 K/UL (ref 1–4.8)
LYMPHOCYTES NFR BLD: 15.2 % (ref 22–41)
MCH RBC QN AUTO: 30.8 PG (ref 27–33)
MCHC RBC AUTO-ENTMCNC: 35.2 G/DL (ref 32.3–36.5)
MCV RBC AUTO: 87.7 FL (ref 81.4–97.8)
METHADONE UR QL SCN: NEGATIVE
MONOCYTES # BLD AUTO: 0.68 K/UL (ref 0–0.85)
MONOCYTES NFR BLD AUTO: 6.9 % (ref 0–13.4)
NEUTROPHILS # BLD AUTO: 7.66 K/UL (ref 1.82–7.42)
NEUTROPHILS NFR BLD: 77.3 % (ref 44–72)
NRBC # BLD AUTO: 0 K/UL
NRBC BLD-RTO: 0 /100 WBC (ref 0–0.2)
OPIATES UR QL SCN: NEGATIVE
OXYCODONE UR QL SCN: NEGATIVE
PCP UR QL SCN: NEGATIVE
PHOSPHATE SERPL-MCNC: 2.8 MG/DL (ref 2.5–4.5)
PLATELET # BLD AUTO: 149 K/UL (ref 164–446)
PMV BLD AUTO: 10.5 FL (ref 9–12.9)
POTASSIUM SERPL-SCNC: 3.3 MMOL/L (ref 3.6–5.5)
PROPOXYPH UR QL SCN: NEGATIVE
PROT SERPL-MCNC: 7.3 G/DL (ref 6–8.2)
RBC # BLD AUTO: 5.35 M/UL (ref 4.7–6.1)
SODIUM SERPL-SCNC: 137 MMOL/L (ref 135–145)
SODIUM UR-SCNC: 68 MMOL/L
WBC # BLD AUTO: 9.9 K/UL (ref 4.8–10.8)

## 2024-05-26 PROCEDURE — 700102 HCHG RX REV CODE 250 W/ 637 OVERRIDE(OP): Mod: JZ | Performed by: INTERNAL MEDICINE

## 2024-05-26 PROCEDURE — A9270 NON-COVERED ITEM OR SERVICE: HCPCS | Mod: JZ | Performed by: INTERNAL MEDICINE

## 2024-05-26 PROCEDURE — A9270 NON-COVERED ITEM OR SERVICE: HCPCS | Performed by: INTERNAL MEDICINE

## 2024-05-26 PROCEDURE — 85025 COMPLETE CBC W/AUTO DIFF WBC: CPT

## 2024-05-26 PROCEDURE — 82652 VIT D 1 25-DIHYDROXY: CPT

## 2024-05-26 PROCEDURE — 80053 COMPREHEN METABOLIC PANEL: CPT

## 2024-05-26 PROCEDURE — 99239 HOSP IP/OBS DSCHRG MGMT >30: CPT | Performed by: INTERNAL MEDICINE

## 2024-05-26 PROCEDURE — 84300 ASSAY OF URINE SODIUM: CPT

## 2024-05-26 PROCEDURE — 80156 ASSAY CARBAMAZEPINE TOTAL: CPT

## 2024-05-26 PROCEDURE — 84100 ASSAY OF PHOSPHORUS: CPT

## 2024-05-26 PROCEDURE — G0378 HOSPITAL OBSERVATION PER HR: HCPCS

## 2024-05-26 PROCEDURE — 82570 ASSAY OF URINE CREATININE: CPT | Mod: XU

## 2024-05-26 PROCEDURE — 80307 DRUG TEST PRSMV CHEM ANLYZR: CPT

## 2024-05-26 PROCEDURE — 700101 HCHG RX REV CODE 250: Performed by: INTERNAL MEDICINE

## 2024-05-26 PROCEDURE — 700102 HCHG RX REV CODE 250 W/ 637 OVERRIDE(OP): Performed by: INTERNAL MEDICINE

## 2024-05-26 PROCEDURE — 97162 PT EVAL MOD COMPLEX 30 MIN: CPT

## 2024-05-26 RX ORDER — CARBAMAZEPINE 200 MG/1
200 TABLET ORAL 2 TIMES DAILY
Status: DISCONTINUED | OUTPATIENT
Start: 2024-05-26 | End: 2024-05-26 | Stop reason: HOSPADM

## 2024-05-26 RX ORDER — CARBAMAZEPINE 200 MG/1
200 TABLET ORAL 2 TIMES DAILY
Qty: 90 TABLET | Refills: 0 | Status: SHIPPED | OUTPATIENT
Start: 2024-05-26

## 2024-05-26 RX ORDER — POTASSIUM CHLORIDE 20 MEQ/1
40 TABLET, EXTENDED RELEASE ORAL ONCE
Status: COMPLETED | OUTPATIENT
Start: 2024-05-26 | End: 2024-05-26

## 2024-05-26 RX ADMIN — SODIUM BICARBONATE 650 MG: 650 TABLET ORAL at 11:20

## 2024-05-26 RX ADMIN — POTASSIUM CHLORIDE AND SODIUM CHLORIDE: 900; 150 INJECTION, SOLUTION INTRAVENOUS at 07:53

## 2024-05-26 RX ADMIN — TAMSULOSIN HYDROCHLORIDE 0.4 MG: 0.4 CAPSULE ORAL at 07:54

## 2024-05-26 RX ADMIN — AMLODIPINE BESYLATE 10 MG: 10 TABLET ORAL at 05:12

## 2024-05-26 RX ADMIN — METOPROLOL SUCCINATE 25 MG: 25 TABLET, EXTENDED RELEASE ORAL at 05:11

## 2024-05-26 RX ADMIN — POTASSIUM CHLORIDE 40 MEQ: 1500 TABLET, EXTENDED RELEASE ORAL at 11:20

## 2024-05-26 RX ADMIN — QUETIAPINE FUMARATE 25 MG: 25 TABLET ORAL at 00:49

## 2024-05-26 RX ADMIN — ESCITALOPRAM OXALATE 20 MG: 10 TABLET ORAL at 05:14

## 2024-05-26 RX ADMIN — SODIUM BICARBONATE 650 MG: 650 TABLET ORAL at 07:53

## 2024-05-26 RX ADMIN — CALCITRIOL CAPSULES 0.25 MCG 0.25 MCG: 0.25 CAPSULE ORAL at 05:13

## 2024-05-26 RX ADMIN — FOLIC ACID 1 MG: 1 TABLET ORAL at 05:12

## 2024-05-26 ASSESSMENT — PAIN DESCRIPTION - PAIN TYPE
TYPE: ACUTE PAIN
TYPE: ACUTE PAIN

## 2024-05-26 ASSESSMENT — COGNITIVE AND FUNCTIONAL STATUS - GENERAL
SUGGESTED CMS G CODE MODIFIER MOBILITY: CI
MOBILITY SCORE: 23
CLIMB 3 TO 5 STEPS WITH RAILING: A LITTLE

## 2024-05-26 ASSESSMENT — GAIT ASSESSMENTS
GAIT LEVEL OF ASSIST: SUPERVISED
DEVIATION: OTHER (COMMENT)
DISTANCE (FEET): 150

## 2024-05-26 NOTE — ASSESSMENT & PLAN NOTE
Continue amlodipine, metoprolol  Hold losartan due to worsening of kidney function  Monitor blood pressure

## 2024-05-26 NOTE — ASSESSMENT & PLAN NOTE
Carbamazepine level is in the supratherapeutic range.  Will hold carbamazepine, repeat carbamazepine level, consider restarting tomorrow  Consider neurology consult if indicated

## 2024-05-26 NOTE — PROGRESS NOTES
Report received from Alfredito RN. Assume care. Pt is AAOx3.  Assessment completed. VSS. Denies pain, able to ambulate to the bathroom and back to room with steady gait. Pt was update for the care for the day. White board updated, All question answered. Pt has call light within reach,  bed is in the lowest position. Pt has no other needs at this time.

## 2024-05-26 NOTE — ASSESSMENT & PLAN NOTE
Creatinine/BUN slightly worsening, has baseline progressive kidney disease and has been talking to nephrology about initiation of peritoneal dialysis at some point.  .  Will check bladder scan to rule out retention.  Ultrasound of the abdomen is pending as well.  Continue tamsulosin  With nephrotoxins  Repeat creatinine/BUN  Will hold losartan temporarily

## 2024-05-26 NOTE — PROGRESS NOTES
4 Eyes Skin Assessment Completed by KALA Dinero and KALA Nelson.    Head Scabs     Ears WDL  Nose WDL  Mouth WDL  Neck WDL  Breast/Chest WDL  Shoulder Blades WDL  Spine WDL  (R) Arm/Elbow/Hand WDL  (L) Arm/Elbow/Hand Scab    Abdomen WDL  Groin WDL  Scrotum/Coccyx/Buttocks WDL  (R) Leg WDL  (L) Leg WDL  (R) Heel/Foot/Toe WDL  (L) Heel/Foot/Toe WDL          Devices In Places Tele Box and Pulse Ox      Interventions In Place Pillows    Possible Skin Injury No    Pictures Uploaded Into Epic Yes  Wound Consult Placed N/A  RN Wound Prevention Protocol Ordered No

## 2024-05-26 NOTE — ASSESSMENT & PLAN NOTE
WBC 13.6.  No evidence of infection on chest x-ray or in UA.  May be secondary to hemoconcentration  Will give IV fluids, check procalcitonin

## 2024-05-26 NOTE — ASSESSMENT & PLAN NOTE
Reported nausea and vomiting today  Lipase is not elevated, right upper quadrant ultrasound pending to rule out biliary abnormalities  Fluids, Zofran as needed  Counseling

## 2024-05-26 NOTE — ED NOTES
PT to BL 19 in .  TBI cancelled as pt had fall without head strike recent  Demonstrated steady gait from  to [facility] with 1 personal assist.  Falls precautions reinforced.  Pt is Ox3, not requiring oxygen.  Son at bedside.  Seizure precautions placed.

## 2024-05-26 NOTE — CARE PLAN
The patient is Watcher - Medium risk of patient condition declining or worsening    Shift Goals  Clinical Goals: Safety, PT/OT/SLP, Neuro consult in AM.  Patient Goals: Rest  Family Goals: Not at the bedside    Progress made toward(s) clinical / shift goals:    Problem: Knowledge Deficit - Standard  Goal: Patient and family/care givers will demonstrate understanding of plan of care, disease process/condition, diagnostic tests and medications  Description: Target End Date:  5/26/24    1.  Patient oriented to unit, equipment, visitation policy and means for communicating concern  2.  Complete/review Learning Assessment  3.  Assess knowledge level of disease process/condition, treatment plan, diagnostic tests and medications  4.  Explain disease process/condition, treatment plan, diagnostic tests and medications  Outcome: Progressing     Problem: Fall Risk  Goal: Patient will remain free from falls  Description: Target End Date:  5/26/24    1.  Assess for fall risk factors  2.  Implement fall precautions  Outcome: Progressing

## 2024-05-26 NOTE — DISCHARGE SUMMARY
Discharge Summary    CHIEF COMPLAINT ON ADMISSION  Chief Complaint   Patient presents with    T-5000 Head Injury     Pt fell into his fridge per family. Seen here earlier today for same issue.     Seizure     History of seizures.        Reason for Admission  Fall     Admission Date  5/25/2024    CODE STATUS  Full Code    HPI & HOSPITAL COURSE  68 y.o. male with past medical history of seizure disorder, cognitive disorder, BPH, hypertension, dyslipidemia, ADHD, CKD stage IV, erosive esophagitis, depression, who presented 5/25/2024 with frequent falls.  He was seen in ED earlier today with supposed tonic-clonic seizure witnessed.  Carbamazepine level was checked and it was supratherapeutic, so he was told to cut down carbamazepine dose from 400 to 200 mg twice daily.  After I he went home he had a ground-level fall falling backward.  Patient states he does not remember how he fell. He denies drinking alcohol or using drugs.  Additional concern per ERP who spoke to his son is decreased oral intake, unsteadiness, generalized weakness.  Blood work showed some worsening of his creatinine/BUN comparing with blood work in April consistent with acute on chronic kidney injury, carbamazepine is still slightly supratherapeutic at 16.  Troponin chronically elevated, 54.  Lactic acid 2.2.  UA is without evidence of infection.  TSH is normal.  Lipase is not elevated.  X-ray is without acute abnormalities  CT head without contrast and C-spine was checked earlier today and negative for acute abnormalities.  Patient was treated with IV fluid hydration.  His potassium was repleted.  His carbamazepine was held.  The next day his kidney function improved and his Tegretol levels were within normal limits.  Patient states he feels better and was able to ambulate the halls without any difficulty.  He was seen by PT OT and had no further postacute needs.  Patient also denies any dysphagia or difficulty swallowing at this time.  Patient was  counseled on cessation of cannabis which may increase frequency of seizures.  Patient was instructed to follow-up with his neurologist and to take carbamazepine 200 mg twice daily and to increase his fluid intake to avoid dehydration.  At this time the patient has no further needs and his vitals have been stable.  He will be discharged home with close outpatient follow-up    Therefore, he is discharged in good and stable condition to home with close outpatient follow-up.    The patient recovered much more quickly than anticipated on admission.    Discharge Date  5/26/24    FOLLOW UP ITEMS POST DISCHARGE  PCP    DISCHARGE DIAGNOSES  Principal Problem:    Fall at home, initial encounter (POA: Yes)  Active Problems:    Leukocytosis (POA: Yes)    Hypertension (POA: Yes)    Seizure (HCC) (POA: Yes)    Tetrahydrocannabinol (THC) dependence (HCC) (POA: Yes)  Resolved Problems:    Cannabis hyperemesis syndrome concurrent with and due to cannabis dependence (HCC) (POA: Yes)    SHANA on CKD stage IV (POA: Yes)    Dysphagia (POA: Unknown)    Overdose of carbamazepine (POA: Unknown)      FOLLOW UP  No future appointments.  No follow-up provider specified.    MEDICATIONS ON DISCHARGE     Medication List        CHANGE how you take these medications        Instructions   carBAMazepine 200 MG Tabs  What changed:   how much to take  additional instructions  Commonly known as: TEGretol   Take 1 Tablet by mouth 2 times a day.  Dose: 200 mg            CONTINUE taking these medications        Instructions   amLODIPine 10 MG Tabs  Commonly known as: Norvasc   Take 10 mg by mouth every day.  Dose: 10 mg     atorvastatin 40 MG Tabs  Commonly known as: Lipitor   Take 60 mg by mouth every evening. 1.5 tablets = 60 mg  Dose: 60 mg     calcitRIOL 0.25 MCG Caps  Commonly known as: Rocaltrol   Take 0.25 mcg by mouth every day.  Dose: 0.25 mcg     calcium polycarbophil 625 MG Tabs  Commonly known as: Fibercon   Take 625 mg by mouth every  day.  Dose: 625 mg     folic acid 1 MG Tabs  Commonly known as: Folvite   Take 1 mg by mouth every day.  Dose: 1 mg     Lexapro 20 MG tablet  Generic drug: escitalopram   Take 20 mg by mouth every day.  Dose: 20 mg     losartan 100 MG Tabs  Commonly known as: Cozaar   Take 100 mg by mouth every day.  Dose: 100 mg     metoprolol SR 25 MG Tb24  Commonly known as: Toprol XL   Take 25 mg by mouth every day.  Dose: 25 mg     QUEtiapine 25 MG Tabs  Commonly known as: SEROquel   Take 25 mg by mouth every evening.  Dose: 25 mg     tamsulosin 0.4 MG capsule  Commonly known as: Flomax   Take 1 Capsule by mouth 1/2 hour after breakfast.  Dose: 0.4 mg              Allergies  No Known Allergies    DIET  Orders Placed This Encounter   Procedures    Diet Order Diet: Renal     Standing Status:   Standing     Number of Occurrences:   1     Order Specific Question:   Diet:     Answer:   Renal [8]       ACTIVITY  As tolerated.  Weight bearing as tolerated    CONSULTATIONS  none    PROCEDURES  none    LABORATORY  Lab Results   Component Value Date    SODIUM 137 05/26/2024    POTASSIUM 3.3 (L) 05/26/2024    CHLORIDE 99 05/26/2024    CO2 17 (L) 05/26/2024    GLUCOSE 98 05/26/2024    BUN 36 (H) 05/26/2024    CREATININE 3.08 (H) 05/26/2024        Lab Results   Component Value Date    WBC 9.9 05/26/2024    HEMOGLOBIN 16.5 05/26/2024    HEMATOCRIT 46.9 05/26/2024    PLATELETCT 149 (L) 05/26/2024        Total time of the discharge process exceeds 32 minutes.

## 2024-05-26 NOTE — PROGRESS NOTES
Monitor Summary:  Rhythm: SB-SR  Rate: 56-65  Ectopy: PVC    0.22/0.06/0.56    Per monitor room interpretation

## 2024-05-26 NOTE — PROGRESS NOTES
Transported pt to CDU via gurney. Pt alert and oriented x4. Pt declines pain at this time. Call light in reach. Bed in lowest position. Fall precautions in place. Bed alarm in place. Seizure precautions in place. Plan of care discussed with pt. Pt agreeing to plan of care. Communication board updated. All questions answered. Assessment completed.

## 2024-05-26 NOTE — ED TRIAGE NOTES
Chief Complaint   Patient presents with    T-5000 Head Injury     Pt fell into his fridge per family. Seen here earlier today for same issue.     Seizure     History of seizures.      Pt BIB family through triage. Pt was seen here earlier today after family reports he had a seizure and fell into his tv. Pt's family reports pt had about 3 more episodes after getting home. Pt is alert and oriented. Pt's family reports pt hasn't been eating much either. Pt is currently in the process of moving into an assisted living.

## 2024-05-26 NOTE — ED NOTES
Med rec updated and complete. Allergies reviewed. Confirmed name and date of birth.  Interviewed pt /family at bedside. Family provided a detailed list of medications   No anticoagulant medications.  No outpatient antibiotic use in last 30 days.    Pt use to fill at [facility]    Pt is no using    [facility] = [phone] ( short term)  [facility] = [phone] ( long term)

## 2024-05-26 NOTE — THERAPY
"Physical Therapy   Initial Evaluation     Patient Name: Roby Viramont[facility] II  Age:  68 y.o., Sex:  male  Medical Record #: [id number]  Today's Date: 5/26/2024     Precautions  Precautions: Fall Risk  Comments: seizure prx    Assessment  Patient is 68 y.o. male presenting after GLF at home. Pt was seen in the ED earlier that same day for evaluation of a possible seizure. Pt with PMH including partial epilepsy, cognitive disorder, BPH, HTN, DLD, ADHD, CKD stage IV, erosive esophagitis, depression. Pt was a questionable historian. Reported that he lives at \"[place] with his wife, however pt also reported that she has been in the hospital for the last 2 months. Pt stated that he is independent with functional mobility at baseline using no AD. During current session, pt presents near functional baseline requiring overall SPV for mobility as detailed below. Able to walk 150 ft with no AD, no LOB. Encouraged pt to continue mobilizing OOB with nursing as tolerated. Recommend d/c home with no further PT or DME needs. Pt denies any mobility concerns with d/c'ing home. Patient will not be actively followed for physical therapy services at this time, however may be seen if requested by physician for 1 more visit within 30 days to address any discharge or equipment needs.    Plan    Physical Therapy Initial Treatment Plan   Duration: Discharge Needs Only    DC Equipment Recommendations: None  Discharge Recommendations: Anticipate that the patient will have no further physical therapy needs after discharge from the hospital       Subjective    Pt received resting in bed, agreeable to participate.      Objective       05/26/24 0844   Initial Contact Note    Initial Contact Note Order Received and Verified, Evaluation Only - Patient Does Not Require Further Acute Physical Therapy at this Time.  However, May Benefit from Post Acute Therapy for Higher Level Functional Deficits.   Precautions   Precautions Fall Risk   Comments " "seizure prx   Vitals   O2 Delivery Device None - Room Air   Pain 0 - 10 Group   Therapist Pain Assessment Post Activity Pain Same as Prior to Activity;Nurse Notified  (no c/o increased pain with mobility)   Prior Living Situation   Prior Services Continuous (24 Hour) Care Giving Per Service   Housing / Facility Assisted Living Residence   Steps Into Home 0   Steps In Home 0   Equipment Owned None   Lives with - Patient's Self Care Capacity Spouse   Comments Pt was a questionable historian. Pt initially reported that he lives in [place] at [facility] but then reported that he is in the process of moving to [place]. Reported that he walks to the dining carrillo for meals. Stated that he normally lives with his wife but she has been in the hospital for the last 2 months   Prior Level of Functional Mobility   Bed Mobility Independent   Transfer Status Independent   Ambulation Independent   Ambulation Distance limited community   Assistive Devices Used None   Stairs Other (Comments)  (no need to perform)   History of Falls   History of Falls Yes   Date of Last Fall   (endorsed 3-4 falls recently in as many days)   Cognition    Cognition / Consciousness X   Level of Consciousness Alert   Comments pleasantly confused, cooperative, repeated himself a couple times, questionable historian   Passive ROM Lower Body   Passive ROM Lower Body WDL   Comments assessed functionally   Active ROM Lower Body    Active ROM Lower Body  WDL   Comments assessed functionally   Strength Lower Body   Lower Body Strength  WDL   Comments functional for household ambulation   Sensation Lower Body   Comments no c/o altered sensation BLE   Coordination Lower Body    Coordination Lower Body  WDL   Comments assessed functionally   Balance Assessment   Sitting Balance (Static) Good   Sitting Balance (Dynamic) Fair +   Standing Balance (Static) Fair   Standing Balance (Dynamic) Fair   Weight Shift Sitting Good   Weight Shift Standing Good   Comments " no AD   Bed Mobility    Supine to Sit Supervised   Sit to Supine Supervised   Scooting Supervised   Rolling Supervised   Comments flat bed   Gait Analysis   Gait Level Of Assist Supervised   Assistive Device None   Distance (Feet) 150   # of Times Distance was Traveled 1   Deviation Other (Comment)  (slightly slowed pace, no LOB)   # of Stairs Climbed 0   Functional Mobility   Sit to Stand Supervised   Bed, Chair, Wheelchair Transfer Supervised   Transfer Method Stand Step   Mobility bed>up around unit no AD>bed   6 Clicks Assessment - How much HELP from from another person do you currently need... (If the patient hasn't done an activity recently, how much help from another person do you think he/she would need if he/she tried?)   Turning from your back to your side while in a flat bed without using bedrails? 4   Moving from lying on your back to sitting on the side of a flat bed without using bedrails? 4   Moving to and from a bed to a chair (including a wheelchair)? 4   Standing up from a chair using your arms (e.g., wheelchair, or bedside chair)? 4   Walking in hospital room? 4   Climbing 3-5 steps with a railing? 3   6 clicks Mobility Score 23   Education Group   Education Provided Role of Physical Therapist   Role of Physical Therapist Patient Response Patient;Acceptance;Explanation;Demonstration;Verbal Demonstration;Action Demonstration   Physical Therapy Initial Treatment Plan    Duration Discharge Needs Only   Problem List    Problems None   Anticipated Discharge Equipment and Recommendations   DC Equipment Recommendations None   Discharge Recommendations Anticipate that the patient will have no further physical therapy needs after discharge from the hospital   Interdisciplinary Plan of Care Collaboration   IDT Collaboration with  Nursing   Patient Position at End of Therapy In Bed;Bed Alarm On;Call Light within Reach;Tray Table within Reach;Phone within Reach   Collaboration Comments RN updated   Session  Information   Date / Session Number  5/26- d/c needs only

## 2024-05-26 NOTE — ED NOTES
Urine sample collected and sent to lab.   Bladder scan performed post void, with a result of 0ml remaining.

## 2024-05-26 NOTE — H&P
Hospital Medicine History & Physical Note    Date of Service  5/25/2024    Primary Care Physician  Anna Barrera P.A.-C.    Code Status  Full Code    Chief Complaint  Chief Complaint   Patient presents with    T-5000 Head Injury     Pt fell into his fridge per family. Seen here earlier today for same issue.     Seizure     History of seizures.        History of Presenting Illness  Roby Viramontes II is a 68 y.o. male with past medical history of partial epilepsy, cognitive disorder, BPH, hypertension, dyslipidemia, ADHD, Kd stage IV, erosive esophagitis, depression, who presented 5/25/2024 with frequent falls.  Patient he was seen in the emergency department earlier today appears to be poor historian.  He was seen in ED earlier today with supposed tonic-clonic seizure witnessed.  Carbamazepine level was checked and it was supratherapeutic, so he was told to cut down carbamazepine dose from 400 to 200 mg twice daily.  After I he went home he had a ground-level fall falling backward.  Patient states he does not remember how he fell.  He additionally does not remember when he had bowel movement last time.  He denies drinking alcohol or using drugs.  Additional concern per ERP who spoke to his son is decreased oral intake, unsteadiness, generalized weakness.  Reportedly he has not eaten for 3 days.  He may have had some nausea.  Denies abdominal pain or fever.  He may have had some difficulties with urination according to the patient.  He may have some difficulties drinking water and choking sensation.  Blood work showed some worsening of his creatinine/BUN comparing with blood work in April, carbamazepine is still slightly supratherapeutic at 16.  Troponin chronically elevated, 54.  Lactic acid 2.2.  UA is without evidence of infection.  TSH is normal.  Lipase is not elevated.  X-ray is without acute abnormalities  CT head without contrast and C-spine was checked earlier today and negative for acute  abnormalities.  I discussed the plan of care with patient and ERP .    Review of Systems  Review of Systems   Unable to perform ROS: Other   Memory loss    Past Medical History   has a past medical history of CKD (chronic kidney disease) stage 4, GFR 15-29 ml/min ([facility]) (6/22/2021), Dyslipidemia, Fall at home, initial encounter (5/25/2024), Hypertension, Hypophosphatemia, Psychiatric problem, and Seizure disorder ([facility]).    Surgical History   has a past surgical history that includes gastroscopy-endo (7/10/2020).     Family History  family history is not on file.   Family history reviewed with patient. There is no family history that is pertinent to the chief complaint.     Social History   reports that he quit smoking about 34 years ago. His smoking use included cigarettes. He has never used smokeless tobacco. He reports that he does not currently use alcohol. He reports that he does not currently use drugs.    Allergies  No Known Allergies    Medications  Prior to Admission Medications   Prescriptions Last Dose Informant Patient Reported? Taking?   QUEtiapine (SEROQUEL) 25 MG Tab 5/24/2024 at 2100 Patient, Family Member Yes Yes   Sig: Take 25 mg by mouth every evening.   amLODIPine (NORVASC) 10 MG Tab 5/25/2024 at 0800 Patient, Family Member Yes Yes   Sig: Take 10 mg by mouth every day.   atorvastatin (LIPITOR) 40 MG Tab 5/24/2024 at 2100 Patient, Family Member Yes Yes   Sig: Take 60 mg by mouth every evening. 1.5 tablets = 60 mg   calcitRIOL (ROCALTROL) 0.25 MCG Cap 5/25/2024 at 0800 Patient, Family Member Yes Yes   Sig: Take 0.25 mcg by mouth every day.   calcium polycarbophil (FIBERCON) 625 MG Tab 5/25/2024 at 0800 Patient, Family Member Yes Yes   Sig: Take 625 mg by mouth every day.   carBAMazepine (TEGRETOL) 200 MG Tab 5/25/2024 at 0800 Patient, Family Member Yes Yes   Sig: Take 200-400 mg by mouth 2 times a day. 200 mg = am  400 mg = evening   escitalopram (LEXAPRO) 20 MG tablet 5/25/2024 at 0800 Patient,  Family Member Yes Yes   Sig: Take 20 mg by mouth every day.   folic acid (FOLVITE) 1 MG Tab 5/25/2024 at 0800 Patient, Family Member Yes Yes   Sig: Take 1 mg by mouth every day.   losartan (COZAAR) 100 MG Tab 5/25/2024 at 0800 Patient, Family Member Yes Yes   Sig: Take 100 mg by mouth every day.   metoprolol SR (TOPROL XL) 25 MG TABLET SR 24 HR 5/25/2024 at 0800 Patient, Family Member Yes Yes   Sig: Take 25 mg by mouth every day.   tamsulosin (FLOMAX) 0.4 MG capsule 5/25/2024 at 0800 Patient, Family Member No Yes   Sig: Take 1 Capsule by mouth 1/2 hour after breakfast.      Facility-Administered Medications: None       Physical Exam  Temp:  [36.2 °C (97.2 °F)-36.7 °C (98 °F)] 36.2 °C (97.2 °F)  Pulse:  [51-66] 56  Resp:  [11-20] 19  BP: ()/() 169/81  SpO2:  [94 %-98 %] 98 %  Blood Pressure : (!) 169/81   Temperature: 36.2 °C (97.2 °F)   Pulse: (!) 56   Respiration: 19   Pulse Oximetry: 98 %       Physical Exam  Vitals and nursing note reviewed.   Constitutional:       General: He is not in acute distress.     Appearance: Normal appearance.   HENT:      Head: Normocephalic and atraumatic.      Nose: Nose normal.      Mouth/Throat:      Mouth: Mucous membranes are dry.   Eyes:      Extraocular Movements: Extraocular movements intact.      Pupils: Pupils are equal, round, and reactive to light.   Cardiovascular:      Rate and Rhythm: Normal rate and regular rhythm.   Pulmonary:      Effort: Pulmonary effort is normal.      Breath sounds: Normal breath sounds.   Abdominal:      General: Abdomen is flat. There is no distension.      Tenderness: There is no abdominal tenderness.   Musculoskeletal:         General: No swelling or deformity. Normal range of motion.      Cervical back: Normal range of motion and neck supple.   Skin:     General: Skin is warm and dry.   Neurological:      General: No focal deficit present.      Mental Status: He is alert and oriented to person, place, and time.   Psychiatric:          Mood and Affect: Mood normal.         Behavior: Behavior normal.         Laboratory:  Recent Labs     05/25/24  1110 05/25/24  1705   WBC 16.3* 13.6*   RBC 5.60 5.63   HEMOGLOBIN 17.4 17.1   HEMATOCRIT 49.6 49.3   MCV 88.6 87.6   MCH 31.1 30.4   MCHC 35.1 34.7   RDW 42.9 42.4   PLATELETCT 192 188   MPV 10.6 10.4     Recent Labs     05/25/24  1110 05/25/24  1705   SODIUM 136 136   POTASSIUM 3.6 3.6   CHLORIDE 95* 94*   CO2 19* 19*   GLUCOSE 126* 123*   BUN 31* 35*   CREATININE 3.48* 3.76*   CALCIUM 10.3 9.9     Recent Labs     05/25/24  1110 05/25/24  1705   ALTSGPT 15 14   ASTSGOT 18 17   ALKPHOSPHAT 137* 139*   TBILIRUBIN 0.7 0.7   LIPASE  --  26   GLUCOSE 126* 123*         Recent Labs     05/25/24  1705   NTPROBNP 8537*         Recent Labs     05/25/24  1110 05/25/24  1705   TROPONINT 52* 54*       Imaging:  DX-CHEST-PORTABLE (1 VIEW)   Final Result      No acute cardiopulmonary disease.      US-ABDOMEN COMPLETE SURVEY    (Results Pending)       X-Ray:  I have personally reviewed the images and compared with prior images.    Assessment/Plan:  Justification for Admission Status  I anticipate this patient is appropriate for observation status at this time because frequent falls    Patient will need a Telemetry bed on MEDICAL service .  The need is secondary to possible syncope    * Fall at home, initial encounter- (present on admission)  Assessment & Plan  PT OT evaluation    Overdose of carbamazepine  Assessment & Plan  Carbamazepine level is in the supratherapeutic range.  Will hold carbamazepine, repeat carbamazepine level, consider restarting tomorrow  Consider neurology consult if indicated    Dysphagia  Assessment & Plan  Reported choking with water  Ordered SLP evaluation    SHANA on CKD stage IV- (present on admission)  Assessment & Plan  Creatinine/BUN slightly worsening, has baseline progressive kidney disease and has been talking to nephrology about initiation of peritoneal dialysis at some point.  .   Will check bladder scan to rule out retention.  Ultrasound of the abdomen is pending as well.  Continue tamsulosin  With nephrotoxins  Repeat creatinine/BUN  Will hold losartan temporarily    Tetrahydrocannabinol (THC) dependence (HCC)- (present on admission)  Assessment & Plan  Noted    Seizure (HCC)- (present on admission)  Assessment & Plan  Patient reported breakthrough seizure earlier today.  Unclear if he had seizure again or mechanical ground-level fall.  Carbamazepine level is supratherapeutic at this time.  Plan: Monitor for recurrent seizure, consider neurology consult  IV diazepam as needed, seizure precautions    Hypertension- (present on admission)  Assessment & Plan  Continue amlodipine, metoprolol  Hold losartan due to worsening of kidney function  Monitor blood pressure    Cannabis hyperemesis syndrome concurrent with and due to cannabis dependence (HCC)- (present on admission)  Assessment & Plan  Reported nausea and vomiting today  Lipase is not elevated, right upper quadrant ultrasound pending to rule out biliary abnormalities  Fluids, Zofran as needed  Counseling    Leukocytosis- (present on admission)  Assessment & Plan  WBC 13.6.  No evidence of infection on chest x-ray or in UA.  May be secondary to hemoconcentration  Will give IV fluids, check procalcitonin        VTE prophylaxis: heparin ppx

## 2024-05-26 NOTE — DISCHARGE INSTRUCTIONS
Fall Prevention in the Home, Adult  Falls can cause injuries and affect people of all ages. There are many simple things that you can do to make your home safe and to help prevent falls. Ask for help when making these changes, if needed.  What actions can I take to prevent falls?  General instructions  Use good lighting in all rooms. Replace any light bulbs that burn out, turn on lights if it is dark, and use night-lights.  Place frequently used items in easy-to-reach places. Lower the shelves around your home if necessary.  Set up furniture so that there are clear paths around it. Avoid moving your furniture around.  Remove throw rugs and other tripping hazards from the floor.  Avoid walking on wet floors.  Fix any uneven floor surfaces.  Add color or contrast paint or tape to grab bars and handrails in your home. Place contrasting color strips on the first and last steps of staircases.  When you use a stepladder, make sure that it is completely opened and that the sides and supports are firmly locked. Have someone hold the ladder while you are using it. Do not climb a closed stepladder.  Know where your pets are when moving through your home.  What can I do in the bathroom?         Keep the floor dry. Immediately clean up any water that is on the floor.  Remove soap buildup in the tub or shower regularly.  Use nonskid mats or decals on the floor of the tub or shower.  Attach bath mats securely with double-sided, nonslip rug tape.  If you need to sit down while you are in the shower, use a plastic, nonslip stool.  Install grab bars by the toilet and in the tub and shower. Do not use towel bars as grab bars.  What can I do in the bedroom?  Make sure that a bedside light is easy to reach.  Do not use oversized bedding that reaches the floor.  Have a firm chair that has side arms to use for getting dressed.  What can I do in the kitchen?  Clean up any spills right away.  If you need to reach for something above you,  use a sturdy step stool that has a grab bar.  Keep electrical cables out of the way.  Do not use floor polish or wax that makes floors slippery. If you must use wax, make sure that it is non-skid floor wax.  What can I do with my stairs?  Do not leave any items on the stairs.  Make sure that you have a light switch at the top and the bottom of the stairs. Have them installed if you do not have them.  Make sure that there are handrails on both sides of the stairs. Fix handrails that are broken or loose. Make sure that handrails are as long as the staircases.  Install non-slip stair treads on all stairs in your home.  Avoid having throw rugs at the top or bottom of stairs, or secure the rugs with carpet tape to prevent them from moving.  Choose a carpet design that does not hide the edge of steps on the stairs.  Check any carpeting to make sure that it is firmly attached to the stairs. Fix any carpet that is loose or worn.  What can I do on the outside of my home?  Use bright outdoor lighting.  Regularly repair the edges of walkways and driveways and fix any cracks.  Remove high doorway thresholds.  Trim any shrubbery on the main path into your home.  Regularly check that handrails are securely fastened and in good repair. Both sides of all steps should have handrails.  Install guardrails along the edges of any raised decks or porches.  Clear walkways of debris and clutter, including tools and rocks.  Have leaves, snow, and ice cleared regularly.  Use sand or salt on walkways during winter months.  In the garage, clean up any spills right away, including grease or oil spills.  What other actions can I take?  Wear closed-toe shoes that fit well and support your feet. Wear shoes that have rubber soles or low heels.  Use mobility aids as needed, such as canes, walkers, scooters, and crutches.  Review your medicines with your health care provider. Some medicines can cause dizziness or changes in blood pressure, which  increase your risk of falling.  Talk with your health care provider about other ways that you can decrease your risk of falls. This may include working with a physical therapist or  to improve your strength, balance, and endurance.  Where to find more information  Centers for Disease Control and Prevention[place]: www.cdc.gov  [employer] [place] [employer] Aging: www.selvin.nih.gov  Contact a health care provider if:  You are afraid of falling at home.  You feel weak, drowsy, or dizzy at home.  You fall at home.  Summary  There are many simple things that you can do to make your home safe and to help prevent falls.  Ways to make your home safe include removing tripping hazards and installing grab bars in the bathroom.  Ask for help when making these changes in your home.  This information is not intended to replace advice given to you by your health care provider. Make sure you discuss any questions you have with your health care provider.  Document Revised: 09/19/2022 Document Reviewed: 07/21/2021  [employer]vier Patient Education © 2023 [employer]

## 2024-05-28 LAB — 1,25(OH)2D3 SERPL-MCNC: 66.2 PG/ML (ref 19.9–79.3)

## 2024-11-01 ENCOUNTER — HOSPITAL ENCOUNTER (OUTPATIENT)
Dept: LAB | Facility: MEDICAL CENTER | Age: 69
End: 2024-11-01
Attending: INTERNAL MEDICINE
Payer: MEDICARE

## 2024-11-01 LAB
25(OH)D3 SERPL-MCNC: 34 NG/ML (ref 30–100)
ANION GAP SERPL CALC-SCNC: 16 MMOL/L (ref 7–16)
BASOPHILS # BLD AUTO: 0.3 % (ref 0–1.8)
BASOPHILS # BLD: 0.02 K/UL (ref 0–0.12)
BUN SERPL-MCNC: 23 MG/DL (ref 8–22)
CALCIUM SERPL-MCNC: 8.9 MG/DL (ref 8.5–10.5)
CHLORIDE SERPL-SCNC: 105 MMOL/L (ref 96–112)
CO2 SERPL-SCNC: 20 MMOL/L (ref 20–33)
CREAT SERPL-MCNC: 2.29 MG/DL (ref 0.5–1.4)
EOSINOPHIL # BLD AUTO: 0.2 K/UL (ref 0–0.51)
EOSINOPHIL NFR BLD: 2.8 % (ref 0–6.9)
ERYTHROCYTE [DISTWIDTH] IN BLOOD BY AUTOMATED COUNT: 43.2 FL (ref 35.9–50)
FERRITIN SERPL-MCNC: 49.8 NG/ML (ref 22–322)
GFR SERPLBLD CREATININE-BSD FMLA CKD-EPI: 30 ML/MIN/1.73 M 2
GLUCOSE SERPL-MCNC: 88 MG/DL (ref 65–99)
HCT VFR BLD AUTO: 44 % (ref 42–52)
HGB BLD-MCNC: 15.1 G/DL (ref 14–18)
IMM GRANULOCYTES # BLD AUTO: 0.05 K/UL (ref 0–0.11)
IMM GRANULOCYTES NFR BLD AUTO: 0.7 % (ref 0–0.9)
IRON SATN MFR SERPL: 47 % (ref 15–55)
IRON SERPL-MCNC: 137 UG/DL (ref 50–180)
LYMPHOCYTES # BLD AUTO: 2.24 K/UL (ref 1–4.8)
LYMPHOCYTES NFR BLD: 31.9 % (ref 22–41)
MCH RBC QN AUTO: 31.8 PG (ref 27–33)
MCHC RBC AUTO-ENTMCNC: 34.3 G/DL (ref 32.3–36.5)
MCV RBC AUTO: 92.6 FL (ref 81.4–97.8)
MONOCYTES # BLD AUTO: 0.56 K/UL (ref 0–0.85)
MONOCYTES NFR BLD AUTO: 8 % (ref 0–13.4)
NEUTROPHILS # BLD AUTO: 3.96 K/UL (ref 1.82–7.42)
NEUTROPHILS NFR BLD: 56.3 % (ref 44–72)
NRBC # BLD AUTO: 0 K/UL
NRBC BLD-RTO: 0 /100 WBC (ref 0–0.2)
PLATELET # BLD AUTO: 192 K/UL (ref 164–446)
PMV BLD AUTO: 10 FL (ref 9–12.9)
POTASSIUM SERPL-SCNC: 4.2 MMOL/L (ref 3.6–5.5)
PTH-INTACT SERPL-MCNC: 175 PG/ML (ref 14–72)
RBC # BLD AUTO: 4.75 M/UL (ref 4.7–6.1)
SODIUM SERPL-SCNC: 141 MMOL/L (ref 135–145)
TIBC SERPL-MCNC: 294 UG/DL (ref 250–450)
UIBC SERPL-MCNC: 157 UG/DL (ref 110–370)
WBC # BLD AUTO: 7 K/UL (ref 4.8–10.8)

## 2024-11-01 PROCEDURE — 82306 VITAMIN D 25 HYDROXY: CPT | Mod: GA

## 2024-11-01 PROCEDURE — 80048 BASIC METABOLIC PNL TOTAL CA: CPT

## 2024-11-01 PROCEDURE — 85025 COMPLETE CBC W/AUTO DIFF WBC: CPT

## 2024-11-01 PROCEDURE — 83970 ASSAY OF PARATHORMONE: CPT

## 2024-11-01 PROCEDURE — 83540 ASSAY OF IRON: CPT | Mod: GA

## 2024-11-01 PROCEDURE — 82728 ASSAY OF FERRITIN: CPT | Mod: GA

## 2024-11-01 PROCEDURE — 83550 IRON BINDING TEST: CPT | Mod: GA

## 2024-11-01 PROCEDURE — 36415 COLL VENOUS BLD VENIPUNCTURE: CPT | Mod: GA

## 2025-02-18 ENCOUNTER — HOSPITAL ENCOUNTER (OUTPATIENT)
Dept: LAB | Facility: MEDICAL CENTER | Age: 70
End: 2025-02-18
Attending: INTERNAL MEDICINE
Payer: MEDICARE

## 2025-02-18 LAB
BASOPHILS # BLD AUTO: 1 % (ref 0–1.8)
BASOPHILS # BLD: 0.08 K/UL (ref 0–0.12)
EOSINOPHIL # BLD AUTO: 0 K/UL (ref 0–0.51)
EOSINOPHIL NFR BLD: 0 % (ref 0–6.9)
ERYTHROCYTE [DISTWIDTH] IN BLOOD BY AUTOMATED COUNT: 48.6 FL (ref 35.9–50)
HCT VFR BLD AUTO: 46.3 % (ref 42–52)
HGB BLD-MCNC: 15.4 G/DL (ref 14–18)
IMM GRANULOCYTES # BLD AUTO: 0.03 K/UL (ref 0–0.11)
IMM GRANULOCYTES NFR BLD AUTO: 0.4 % (ref 0–0.9)
LYMPHOCYTES # BLD AUTO: 2.18 K/UL (ref 1–4.8)
LYMPHOCYTES NFR BLD: 26 % (ref 22–41)
MCH RBC QN AUTO: 32.2 PG (ref 27–33)
MCHC RBC AUTO-ENTMCNC: 33.3 G/DL (ref 32.3–36.5)
MCV RBC AUTO: 96.7 FL (ref 81.4–97.8)
MONOCYTES # BLD AUTO: 0.68 K/UL (ref 0–0.85)
MONOCYTES NFR BLD AUTO: 8.1 % (ref 0–13.4)
NEUTROPHILS # BLD AUTO: 5.42 K/UL (ref 1.82–7.42)
NEUTROPHILS NFR BLD: 64.5 % (ref 44–72)
NRBC # BLD AUTO: 0 K/UL
NRBC BLD-RTO: 0 /100 WBC (ref 0–0.2)
PLATELET # BLD AUTO: 127 K/UL (ref 164–446)
PMV BLD AUTO: 10.5 FL (ref 9–12.9)
PTH-INTACT SERPL-MCNC: 268 PG/ML (ref 14–72)
RBC # BLD AUTO: 4.79 M/UL (ref 4.7–6.1)
WBC # BLD AUTO: 8.4 K/UL (ref 4.8–10.8)

## 2025-02-18 PROCEDURE — 85025 COMPLETE CBC W/AUTO DIFF WBC: CPT

## 2025-02-18 PROCEDURE — 80048 BASIC METABOLIC PNL TOTAL CA: CPT

## 2025-02-18 PROCEDURE — 36415 COLL VENOUS BLD VENIPUNCTURE: CPT

## 2025-02-18 PROCEDURE — 83540 ASSAY OF IRON: CPT | Mod: GA

## 2025-02-18 PROCEDURE — 82728 ASSAY OF FERRITIN: CPT | Mod: GA

## 2025-02-18 PROCEDURE — 83550 IRON BINDING TEST: CPT | Mod: GA

## 2025-02-18 PROCEDURE — 83970 ASSAY OF PARATHORMONE: CPT

## 2025-02-18 PROCEDURE — 82306 VITAMIN D 25 HYDROXY: CPT | Mod: GA

## 2025-02-19 LAB
25(OH)D3 SERPL-MCNC: 31 NG/ML (ref 30–100)
ANION GAP SERPL CALC-SCNC: 13 MMOL/L (ref 7–16)
BUN SERPL-MCNC: 18 MG/DL (ref 8–22)
CALCIUM SERPL-MCNC: 9.2 MG/DL (ref 8.5–10.5)
CHLORIDE SERPL-SCNC: 107 MMOL/L (ref 96–112)
CO2 SERPL-SCNC: 22 MMOL/L (ref 20–33)
CREAT SERPL-MCNC: 2.26 MG/DL (ref 0.5–1.4)
FERRITIN SERPL-MCNC: 78.4 NG/ML (ref 22–322)
GFR SERPLBLD CREATININE-BSD FMLA CKD-EPI: 30 ML/MIN/1.73 M 2
GLUCOSE SERPL-MCNC: 90 MG/DL (ref 65–99)
IRON SATN MFR SERPL: 30 % (ref 15–55)
IRON SERPL-MCNC: 77 UG/DL (ref 50–180)
POTASSIUM SERPL-SCNC: 4.5 MMOL/L (ref 3.6–5.5)
SODIUM SERPL-SCNC: 142 MMOL/L (ref 135–145)
TIBC SERPL-MCNC: 253 UG/DL (ref 250–450)
UIBC SERPL-MCNC: 176 UG/DL (ref 110–370)

## (undated) DEVICE — SYRINGE SAFETY 5 ML 18 GA X 1-1/2 BLUNT LL (100/BX 4BX/CA)

## (undated) DEVICE — SPONGE GAUZE NON-STERILE 4X4 - (2000/CA 10PK/CA)

## (undated) DEVICE — GOWN SURGEONS LARGE - (32/CA)

## (undated) DEVICE — SYRINGE 12 CC LUER TIP - (80/BX) OBSOLETE ITEM

## (undated) DEVICE — SYRINGE SAFETY 3 ML 18 GA X 1 1/2 BLUNT LL (100/BX 8BX/CA)

## (undated) DEVICE — SYRINGE DISP. 60 CC LL - (30/BX, 12BX/CA)**WHEN THESE ARE GONE ORDER #500206**

## (undated) DEVICE — LACTATED RINGERS INJ 1000 ML - (14EA/CA 60CA/PF)

## (undated) DEVICE — TUBING CLEARLINK DUO-VENT - C-FLO (48EA/CA)

## (undated) DEVICE — NEPTUNE 4 PORT MANIFOLD - (20/PK)

## (undated) DEVICE — BLOCK BITE ENDOSCOPIC 2809 - (100/BX) INTERMEDIATE

## (undated) DEVICE — MASK WITH FACE SHIELD (25/BX 4BX/CA)

## (undated) DEVICE — KIT CUSTOM PROCEDURE SINGLE FOR ENDO  (15/CA)

## (undated) DEVICE — SENSOR SPO2 ADULT LNCS ADTX (20/BX) ORDER ITEM #19593

## (undated) DEVICE — SYRINGE SAFETY 10 ML 18 GA X 1 1/2 BLUNT LL (100/BX 4BX/CA)

## (undated) DEVICE — KIT ANESTHESIA W/CIRCUIT & 3/LT BAG W/FILTER (20EA/CA)

## (undated) DEVICE — KIT  I.V. START (100EA/CA)

## (undated) DEVICE — GLOVE, LITE (PAIR)

## (undated) DEVICE — ELECTRODE DUAL RETURN W/ CORD - (50/PK)

## (undated) DEVICE — MASK ANESTHESIA ADULT  - (100/CA)

## (undated) DEVICE — FORCEP RADIAL JAW 4 STANDARD CAPACITY W/NEEDLE 240CM (40EA/BX)

## (undated) DEVICE — WATER IRRIGATION STERILE 1000ML (12EA/CA)

## (undated) DEVICE — TUBE SUCTION YANKAUER  1/4 X 6FT (20EA/CA)"